# Patient Record
Sex: FEMALE | Race: WHITE | Employment: UNEMPLOYED | URBAN - METROPOLITAN AREA
[De-identification: names, ages, dates, MRNs, and addresses within clinical notes are randomized per-mention and may not be internally consistent; named-entity substitution may affect disease eponyms.]

---
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
## 2018-02-06 ENCOUNTER — TELEPHONE (OUTPATIENT)
Dept: OBGYN CLINIC | Facility: CLINIC | Age: 18
End: 2018-02-06

## 2018-02-06 DIAGNOSIS — Z11.3 ROUTINE SCREENING FOR STI (SEXUALLY TRANSMITTED INFECTION): ICD-10-CM

## 2018-02-07 RX ORDER — NORGESTIMATE AND ETHINYL ESTRADIOL
1 KIT DAILY
Qty: 28 TABLET | Refills: 6 | Status: SHIPPED | OUTPATIENT
Start: 2018-02-07 | End: 2018-06-07

## 2018-02-07 NOTE — TELEPHONE ENCOUNTER
Spoke w/ pt. States her migraines have resolved. Had pcp appt 1 mos after bp check in cnm office & states her bp was normal at that time (does not remember value). Advised pt to check bp in the next week & let our office know of results.  OK per MJ to refil

## 2018-02-07 NOTE — TELEPHONE ENCOUNTER
Fax received for req of ocp refill. Pt had elevated bp w/ recheck in Nov. Will check w/ pt to see if she has monitored her bp.  Unable to lm, voice mailbox is full

## 2018-02-15 NOTE — PROGRESS NOTES
[address], [address] [address]  Dept: [phone]     Date:   2017    Patient:  Adalid Almaguer  :      2000  MRN:      [id number]    Chief Complaint: Patient has no known allergies.      Social History:      Social History  Social History   Marital status: Single  Spouse name: N/A    Years of education: N/A  Number of children: N/A     Occupational History  None on file     Social History Main Topics   S meals, Utlize portion control strategies to reduce calorie intake, Identify triggers for eating and manage cues and Eat slowly and take 20 to 30 minutes to complete each meal    Exercise Goals Reviewed and Discussed    Walk for  30 Minutes and Treadmill fo Mary Ellen Don MD

## 2018-02-16 ENCOUNTER — LAB ENCOUNTER (OUTPATIENT)
Dept: LAB | Facility: HOSPITAL | Age: 18
End: 2018-02-16
Attending: INTERNAL MEDICINE
Payer: COMMERCIAL

## 2018-02-16 DIAGNOSIS — Z51.81 ENCOUNTER FOR THERAPEUTIC DRUG MONITORING: ICD-10-CM

## 2018-02-16 DIAGNOSIS — F50.81 BINGE EATING DISORDER: ICD-10-CM

## 2018-02-16 DIAGNOSIS — E66.01 MORBID OBESITY WITH BMI OF 45.0-49.9, ADULT (HCC): ICD-10-CM

## 2018-02-16 DIAGNOSIS — R73.09 ABNORMAL BLOOD SUGAR: ICD-10-CM

## 2018-02-16 DIAGNOSIS — R53.82 CHRONIC FATIGUE: ICD-10-CM

## 2018-02-16 DIAGNOSIS — E55.9 VITAMIN D DEFICIENCY: ICD-10-CM

## 2018-02-16 LAB
25(OH)D3 SERPL-MCNC: 20.1 NG/ML
ALBUMIN SERPL BCP-MCNC: 4 G/DL (ref 3.5–4.8)
ALBUMIN/GLOB SERPL: 1.2 {RATIO} (ref 1–2)
ALP SERPL-CCNC: 45 U/L (ref 39–325)
ALT SERPL-CCNC: 94 U/L (ref 14–54)
ANION GAP SERPL CALC-SCNC: 10 MMOL/L (ref 0–18)
AST SERPL-CCNC: 90 U/L (ref 15–41)
BASOPHILS # BLD: 0.1 K/UL (ref 0–0.2)
BASOPHILS NFR BLD: 1 %
BILIRUB SERPL-MCNC: 0.4 MG/DL (ref 0.3–1.2)
BUN SERPL-MCNC: 9 MG/DL (ref 8–20)
BUN/CREAT SERPL: 13.8 (ref 10–20)
CALCIUM SERPL-MCNC: 10 MG/DL (ref 8.5–10.5)
CHLORIDE SERPL-SCNC: 104 MMOL/L (ref 95–110)
CHOLEST SERPL-MCNC: 217 MG/DL (ref 110–200)
CO2 SERPL-SCNC: 25 MMOL/L (ref 22–32)
CREAT SERPL-MCNC: 0.65 MG/DL (ref 0.5–1.5)
EOSINOPHIL # BLD: 0.2 K/UL (ref 0–0.7)
EOSINOPHIL NFR BLD: 2 %
ERYTHROCYTE [DISTWIDTH] IN BLOOD BY AUTOMATED COUNT: 14.2 % (ref 11–15)
FERRITIN SERPL IA-MCNC: 47 NG/ML (ref 11–307)
FOLATE SERPL-MCNC: >24 NG/ML
GLOBULIN PLAS-MCNC: 3.4 G/DL (ref 2.5–3.7)
GLUCOSE SERPL-MCNC: 100 MG/DL (ref 70–99)
HBA1C MFR BLD: 5.6 % (ref 4–6)
HCT VFR BLD AUTO: 41.1 % (ref 35–48)
HDLC SERPL-MCNC: 49 MG/DL
HGB BLD-MCNC: 13.6 G/DL (ref 12–16)
IRON SATN MFR SERPL: 18 % (ref 15–50)
IRON SERPL-MCNC: 82 MCG/DL (ref 28–170)
LDLC SERPL CALC-MCNC: 142 MG/DL (ref 0–99)
LYMPHOCYTES # BLD: 2.2 K/UL (ref 1–4)
LYMPHOCYTES NFR BLD: 31 %
MAGNESIUM SERPL-MCNC: 1.9 MG/DL (ref 1.8–2.5)
MCH RBC QN AUTO: 27.9 PG (ref 27–32)
MCHC RBC AUTO-ENTMCNC: 33.2 G/DL (ref 32–37)
MCV RBC AUTO: 84 FL (ref 80–100)
MONOCYTES # BLD: 0.6 K/UL (ref 0–1)
MONOCYTES NFR BLD: 9 %
NEUTROPHILS # BLD AUTO: 4.1 K/UL (ref 1.8–7.7)
NEUTROPHILS NFR BLD: 58 %
NONHDLC SERPL-MCNC: 168 MG/DL
OSMOLALITY UR CALC.SUM OF ELEC: 287 MOSM/KG (ref 275–295)
PATIENT FASTING: YES
PHOSPHATE SERPL-MCNC: 3.7 MG/DL (ref 2.4–4.7)
PLATELET # BLD AUTO: 333 K/UL (ref 140–400)
PMV BLD AUTO: 7.2 FL (ref 7.4–10.3)
POTASSIUM SERPL-SCNC: 4.5 MMOL/L (ref 3.3–5.1)
PROT SERPL-MCNC: 7.4 G/DL (ref 5.9–8.4)
RBC # BLD AUTO: 4.89 M/UL (ref 3.7–5.4)
SODIUM SERPL-SCNC: 139 MMOL/L (ref 136–144)
TIBC SERPL-MCNC: 455 MCG/DL (ref 228–428)
TRANSFERRIN SERPL-MCNC: 345 MG/DL (ref 192–382)
TRIGL SERPL-MCNC: 130 MG/DL (ref 1–149)
TSH SERPL-ACNC: 1.1 UIU/ML (ref 0.45–5.33)
VIT B12 SERPL-MCNC: 459 PG/ML (ref 181–914)
WBC # BLD AUTO: 7.2 K/UL (ref 4–11)

## 2018-02-16 PROCEDURE — 85025 COMPLETE CBC W/AUTO DIFF WBC: CPT

## 2018-02-16 PROCEDURE — 84100 ASSAY OF PHOSPHORUS: CPT

## 2018-02-16 PROCEDURE — 82306 VITAMIN D 25 HYDROXY: CPT

## 2018-02-16 PROCEDURE — 84425 ASSAY OF VITAMIN B-1: CPT

## 2018-02-16 PROCEDURE — 82746 ASSAY OF FOLIC ACID SERUM: CPT

## 2018-02-16 PROCEDURE — 82607 VITAMIN B-12: CPT

## 2018-02-16 PROCEDURE — 80061 LIPID PANEL: CPT

## 2018-02-16 PROCEDURE — 84466 ASSAY OF TRANSFERRIN: CPT

## 2018-02-16 PROCEDURE — 83036 HEMOGLOBIN GLYCOSYLATED A1C: CPT

## 2018-02-16 PROCEDURE — 36415 COLL VENOUS BLD VENIPUNCTURE: CPT

## 2018-02-16 PROCEDURE — 84443 ASSAY THYROID STIM HORMONE: CPT

## 2018-02-16 PROCEDURE — 80053 COMPREHEN METABOLIC PANEL: CPT

## 2018-02-16 PROCEDURE — 82728 ASSAY OF FERRITIN: CPT

## 2018-02-16 PROCEDURE — 83540 ASSAY OF IRON: CPT

## 2018-02-16 PROCEDURE — 83735 ASSAY OF MAGNESIUM: CPT

## 2018-02-20 ENCOUNTER — TELEPHONE (OUTPATIENT)
Dept: SURGERY | Facility: CLINIC | Age: 18
End: 2018-02-20

## 2018-02-20 NOTE — TELEPHONE ENCOUNTER
Spoke to patient about labs    Elevated liver enzymes: fatty liver    Low vitamin D: continue supplements    High cholesterol

## 2018-02-21 LAB — VITAMIN B1 (THIAMINE), WHOLE B: 72 NMOL/L

## 2018-02-23 ENCOUNTER — TELEPHONE (OUTPATIENT)
Dept: SURGERY | Facility: CLINIC | Age: 18
End: 2018-02-23

## 2018-02-23 NOTE — TELEPHONE ENCOUNTER
2/23/18 @ 10:52am Spoke to Mariah Somers. at [facility], [phone], Ref#use name of rep, date & time of call. She verified that patient has following benefits for Bariatric services: No weight management criteria. No ARABELLA/Blue Distinction required.

## 2018-03-06 ENCOUNTER — TELEPHONE (OUTPATIENT)
Dept: OBGYN CLINIC | Facility: CLINIC | Age: 18
End: 2018-03-06

## 2018-03-06 NOTE — TELEPHONE ENCOUNTER
Pt states she thinks she has a yeast infection. Reports thick, white, pasty vag discharge & burning w/ urination. Has not tried otc treatments. Req appt. Scheduled w/ MES tomorrow.  Pt verbalized an understanding & agrees w/ plan

## 2018-03-07 ENCOUNTER — OFFICE VISIT (OUTPATIENT)
Dept: OBGYN CLINIC | Facility: CLINIC | Age: 18
End: 2018-03-07

## 2018-03-07 VITALS
DIASTOLIC BLOOD PRESSURE: 88 MMHG | HEART RATE: 101 BPM | BODY MASS INDEX: 50 KG/M2 | WEIGHT: 293 LBS | SYSTOLIC BLOOD PRESSURE: 131 MMHG

## 2018-03-07 DIAGNOSIS — B37.3 VAGINAL YEAST INFECTION: Primary | ICD-10-CM

## 2018-03-07 DIAGNOSIS — Z11.3 SCREEN FOR STD (SEXUALLY TRANSMITTED DISEASE): ICD-10-CM

## 2018-03-07 DIAGNOSIS — N89.8 VAGINAL DISCHARGE: ICD-10-CM

## 2018-03-07 PROCEDURE — 99213 OFFICE O/P EST LOW 20 MIN: CPT | Performed by: ADVANCED PRACTICE MIDWIFE

## 2018-03-07 PROCEDURE — 87210 SMEAR WET MOUNT SALINE/INK: CPT | Performed by: ADVANCED PRACTICE MIDWIFE

## 2018-03-08 ENCOUNTER — TELEPHONE (OUTPATIENT)
Dept: OBGYN CLINIC | Facility: CLINIC | Age: 18
End: 2018-03-08

## 2018-03-08 LAB
C TRACH DNA SPEC QL NAA+PROBE: NEGATIVE
N GONORRHOEA DNA SPEC QL NAA+PROBE: NEGATIVE

## 2018-03-08 NOTE — PROGRESS NOTES
[address] [facility] Focused Gynecology Problem Exam    Ashu Vegas is a 19year old female presenting for Gyn Exam (vaginal discharge, started yesterday, had burning when urinating yesterday,)  .     HPI:   Patient presents with:  Gyn Exam: Shasha Henderson Cancer Maternal Grandfather          Social History  Social History   Marital status: Single  Spouse name: N/A    Years of education: N/A  Number of children: N/A     Occupational History  None on file     Social History Main Topics   Smoking status: Never Jayshree Smith  3/8/2018  2:57 PM

## 2018-03-09 ENCOUNTER — TELEPHONE (OUTPATIENT)
Dept: OBGYN CLINIC | Facility: CLINIC | Age: 18
End: 2018-03-09

## 2018-03-09 ENCOUNTER — TELEPHONE (OUTPATIENT)
Dept: SURGERY | Facility: CLINIC | Age: 18
End: 2018-03-09

## 2018-03-09 NOTE — TELEPHONE ENCOUNTER
Called patient to follow up with her regarding scheduling an appt with Bariatric Dietitian per the recommendation of Dr. hTais Leone. Patient would like to schedule this either when she comes for her next appt with Dr. Thais Leone or she will call at a later date.

## 2018-06-07 ENCOUNTER — TELEPHONE (OUTPATIENT)
Dept: PEDIATRICS CLINIC | Facility: CLINIC | Age: 18
End: 2018-06-07

## 2018-06-07 DIAGNOSIS — Z11.3 ROUTINE SCREENING FOR STI (SEXUALLY TRANSMITTED INFECTION): ICD-10-CM

## 2018-06-07 RX ORDER — NORGESTIMATE AND ETHINYL ESTRADIOL
1 KIT DAILY
Qty: 28 TABLET | Refills: 5 | Status: SHIPPED | OUTPATIENT
Start: 2018-06-07 | End: 2018-06-14

## 2018-06-07 NOTE — TELEPHONE ENCOUNTER
Spoke w/ pt regarding need for recent bp check. Pt is seeing Dr Hugo Vann next week. Pt leaves for [place] on 6/19 t visit family x1mos. Discussed walking, hydration, wearing support hose due to risk of blood clots on flight.  Pt will call after her appt w/ bp r

## 2018-06-14 NOTE — TELEPHONE ENCOUNTER
Called pt for BP result from appt w/ Dr Evelina Katz today. Pt states she forgot to show up. Advised pt she needs to have BP taken prior to refill. Pt agreed. Appt scheduled for tomorrow at 0830.  Pt verbalized an understanding & agrees w/ plan

## 2018-06-15 ENCOUNTER — NURSE ONLY (OUTPATIENT)
Dept: OBGYN CLINIC | Facility: CLINIC | Age: 18
End: 2018-06-15

## 2018-06-15 VITALS — DIASTOLIC BLOOD PRESSURE: 87 MMHG | SYSTOLIC BLOOD PRESSURE: 121 MMHG

## 2018-06-15 DIAGNOSIS — Z01.30 BLOOD PRESSURE CHECK ON ORAL CONTRACEPTIVES: Primary | ICD-10-CM

## 2018-06-15 DIAGNOSIS — Z79.3 BLOOD PRESSURE CHECK ON ORAL CONTRACEPTIVES: Primary | ICD-10-CM

## 2018-06-15 RX ORDER — NORGESTIMATE AND ETHINYL ESTRADIOL 7DAYSX3 LO
1 KIT ORAL DAILY
Qty: 28 TABLET | Refills: 4 | Status: SHIPPED | OUTPATIENT
Start: 2018-06-15 | End: 2018-07-13

## 2018-06-15 NOTE — PROGRESS NOTES
Pt needed refill on ocp. Leaving for [facility] next week for 1 mos. Had elevated bp's prior. Pt was to have appt w/ Dr Ladonna Velazquez yesterday but missed. BP acceptable. Reviewed methods to decrease blood clots while traveling. MJ notified.  Pt verbalized an English

## 2018-06-30 ENCOUNTER — LAB ENCOUNTER (OUTPATIENT)
Dept: LAB | Age: 18
End: 2018-06-30
Attending: INTERNAL MEDICINE
Payer: COMMERCIAL

## 2018-06-30 ENCOUNTER — LAB ENCOUNTER (OUTPATIENT)
Dept: LAB | Facility: HOSPITAL | Age: 18
End: 2018-06-30
Attending: INTERNAL MEDICINE
Payer: COMMERCIAL

## 2018-06-30 DIAGNOSIS — R19.7 DIARRHEA: ICD-10-CM

## 2018-06-30 DIAGNOSIS — Z00.00 ROUTINE GENERAL MEDICAL EXAMINATION AT A HEALTH CARE FACILITY: Primary | ICD-10-CM

## 2018-06-30 DIAGNOSIS — Z00.00 ROUTINE GENERAL MEDICAL EXAMINATION AT A HEALTH CARE FACILITY: ICD-10-CM

## 2018-06-30 LAB
ALBUMIN SERPL BCP-MCNC: 4 G/DL (ref 3.5–4.8)
ALBUMIN/GLOB SERPL: 1.3 {RATIO} (ref 1–2)
ALP SERPL-CCNC: 52 U/L (ref 39–325)
ALT SERPL-CCNC: 56 U/L (ref 14–54)
ANION GAP SERPL CALC-SCNC: 10 MMOL/L (ref 0–18)
AST SERPL-CCNC: 39 U/L (ref 15–41)
BASOPHILS # BLD: 0 K/UL (ref 0–0.2)
BASOPHILS NFR BLD: 1 %
BILIRUB SERPL-MCNC: 0.4 MG/DL (ref 0.3–1.2)
BUN SERPL-MCNC: 7 MG/DL (ref 8–20)
BUN/CREAT SERPL: 10.4 (ref 10–20)
CALCIUM SERPL-MCNC: 9.5 MG/DL (ref 8.5–10.5)
CHLORIDE SERPL-SCNC: 105 MMOL/L (ref 95–110)
CHOLEST SERPL-MCNC: 209 MG/DL (ref 110–200)
CO2 SERPL-SCNC: 24 MMOL/L (ref 22–32)
CREAT SERPL-MCNC: 0.67 MG/DL (ref 0.5–1.5)
CRP SERPL HS-MCNC: 5.8 MG/L (ref 0–7.5)
EOSINOPHIL # BLD: 0.1 K/UL (ref 0–0.7)
EOSINOPHIL NFR BLD: 1 %
ERYTHROCYTE [DISTWIDTH] IN BLOOD BY AUTOMATED COUNT: 13.4 % (ref 11–15)
ERYTHROCYTE [SEDIMENTATION RATE] IN BLOOD: 12 MM/HR (ref 0–20)
FERRITIN SERPL IA-MCNC: 38 NG/ML (ref 11–307)
GLOBULIN PLAS-MCNC: 3.2 G/DL (ref 2.5–3.7)
GLUCOSE SERPL-MCNC: 87 MG/DL (ref 70–99)
HBA1C MFR BLD: 5.7 % (ref 4–6)
HCT VFR BLD AUTO: 42.4 % (ref 35–48)
HDLC SERPL-MCNC: 54 MG/DL
HGB BLD-MCNC: 14.2 G/DL (ref 12–16)
IRON SATN MFR SERPL: 20 % (ref 15–50)
IRON SERPL-MCNC: 85 MCG/DL (ref 28–170)
LDLC SERPL CALC-MCNC: 129 MG/DL (ref 0–99)
LIPASE SERPL-CCNC: 25 U/L (ref 22–51)
LYMPHOCYTES # BLD: 2.2 K/UL (ref 1–4)
LYMPHOCYTES NFR BLD: 29 %
MAGNESIUM SERPL-MCNC: 2 MG/DL (ref 1.8–2.5)
MCH RBC QN AUTO: 28 PG (ref 27–32)
MCHC RBC AUTO-ENTMCNC: 33.6 G/DL (ref 32–37)
MCV RBC AUTO: 83.2 FL (ref 80–100)
MONOCYTES # BLD: 0.6 K/UL (ref 0–1)
MONOCYTES NFR BLD: 8 %
NEUTROPHILS # BLD AUTO: 4.8 K/UL (ref 1.8–7.7)
NEUTROPHILS NFR BLD: 62 %
NONHDLC SERPL-MCNC: 155 MG/DL
OSMOLALITY UR CALC.SUM OF ELEC: 285 MOSM/KG (ref 275–295)
PATIENT FASTING: YES
PLATELET # BLD AUTO: 344 K/UL (ref 140–400)
PMV BLD AUTO: 7.9 FL (ref 7.4–10.3)
POTASSIUM SERPL-SCNC: 4 MMOL/L (ref 3.3–5.1)
PROT SERPL-MCNC: 7.2 G/DL (ref 5.9–8.4)
RBC # BLD AUTO: 5.1 M/UL (ref 3.7–5.4)
RHEUMATOID FACT SER QL: <5 IU/ML
SODIUM SERPL-SCNC: 139 MMOL/L (ref 136–144)
TIBC SERPL-MCNC: 432 MCG/DL (ref 228–428)
TRANSFERRIN SERPL-MCNC: 327 MG/DL (ref 192–382)
TRIGL SERPL-MCNC: 130 MG/DL (ref 1–149)
TSH SERPL-ACNC: 1.19 UIU/ML (ref 0.45–5.33)
VIT B12 SERPL-MCNC: 367 PG/ML (ref 181–914)
WBC # BLD AUTO: 7.8 K/UL (ref 4–11)

## 2018-06-30 PROCEDURE — 80500 HEPATITIS A B + C PROFILE: CPT

## 2018-06-30 PROCEDURE — 86704 HEP B CORE ANTIBODY TOTAL: CPT

## 2018-06-30 PROCEDURE — 36415 COLL VENOUS BLD VENIPUNCTURE: CPT

## 2018-06-30 PROCEDURE — 85025 COMPLETE CBC W/AUTO DIFF WBC: CPT

## 2018-06-30 PROCEDURE — 80053 COMPREHEN METABOLIC PANEL: CPT

## 2018-06-30 PROCEDURE — 86709 HEPATITIS A IGM ANTIBODY: CPT

## 2018-06-30 PROCEDURE — 86141 C-REACTIVE PROTEIN HS: CPT

## 2018-06-30 PROCEDURE — 86431 RHEUMATOID FACTOR QUANT: CPT

## 2018-06-30 PROCEDURE — 83540 ASSAY OF IRON: CPT

## 2018-06-30 PROCEDURE — 87507 IADNA-DNA/RNA PROBE TQ 12-25: CPT

## 2018-06-30 PROCEDURE — 86708 HEPATITIS A ANTIBODY: CPT

## 2018-06-30 PROCEDURE — 83036 HEMOGLOBIN GLYCOSYLATED A1C: CPT

## 2018-06-30 PROCEDURE — 87427 SHIGA-LIKE TOXIN AG IA: CPT

## 2018-06-30 PROCEDURE — 87340 HEPATITIS B SURFACE AG IA: CPT

## 2018-06-30 PROCEDURE — 87046 STOOL CULTR AEROBIC BACT EA: CPT

## 2018-06-30 PROCEDURE — 83735 ASSAY OF MAGNESIUM: CPT

## 2018-06-30 PROCEDURE — 83013 H PYLORI (C-13) BREATH: CPT

## 2018-06-30 PROCEDURE — 85652 RBC SED RATE AUTOMATED: CPT

## 2018-06-30 PROCEDURE — 86706 HEP B SURFACE ANTIBODY: CPT

## 2018-06-30 PROCEDURE — 84443 ASSAY THYROID STIM HORMONE: CPT

## 2018-06-30 PROCEDURE — 86038 ANTINUCLEAR ANTIBODIES: CPT

## 2018-06-30 PROCEDURE — 80061 LIPID PANEL: CPT

## 2018-06-30 PROCEDURE — 87045 FECES CULTURE AEROBIC BACT: CPT

## 2018-06-30 PROCEDURE — 82306 VITAMIN D 25 HYDROXY: CPT

## 2018-06-30 PROCEDURE — 83690 ASSAY OF LIPASE: CPT

## 2018-06-30 PROCEDURE — 86803 HEPATITIS C AB TEST: CPT

## 2018-06-30 PROCEDURE — 84466 ASSAY OF TRANSFERRIN: CPT

## 2018-06-30 PROCEDURE — 82607 VITAMIN B-12: CPT

## 2018-06-30 PROCEDURE — 82728 ASSAY OF FERRITIN: CPT

## 2018-07-02 LAB
25(OH)D3 SERPL-MCNC: 26.9 NG/ML
NUCLEAR IGG TITR SER IF: NEGATIVE {TITER}

## 2018-07-03 LAB
H. PYLORI BREATH TEST: NEGATIVE
HAV AB SER QL IA: REACTIVE
HAV IGM SERPL QL IA: NONREACTIVE
HBV CORE AB SERPL QL IA: NONREACTIVE
HBV SURFACE AB SER-ACNC: <3.1 MIU/ML (ref ?–10)
HBV SURFACE AG SERPL QL IA: NONREACTIVE
HBV SURFACE AG SERPL QL IA: NONREACTIVE
HCV AB SERPL QL IA: NONREACTIVE

## 2018-10-01 ENCOUNTER — TELEPHONE (OUTPATIENT)
Dept: OBGYN CLINIC | Facility: CLINIC | Age: 18
End: 2018-10-01

## 2018-10-01 RX ORDER — NORGESTIMATE AND ETHINYL ESTRADIOL 7DAYSX3 LO
1 KIT ORAL DAILY
Qty: 28 TABLET | Refills: 1 | Status: SHIPPED | OUTPATIENT
Start: 2018-10-01 | End: 2018-10-29

## 2018-10-01 NOTE — TELEPHONE ENCOUNTER
Spoke with pharmacy and they stated pt's insurance is requesting a 90 day supply of her OCP's Tri-LO-Sprintec. 90 day supply approved.

## 2018-10-01 NOTE — TELEPHONE ENCOUNTER
BRITTA. Refill request received from pt's pharmacy [facility] in [place] to refill Tri-Lo-Sprintec 28 day. Pt has a history of elevated BP's. Last BP was 121/87 on 6/15/18 and pt was given r/f. Last C&P was 11/7/17.   Per MES, OK for refill but pt must schedul

## 2018-10-01 NOTE — TELEPHONE ENCOUNTER
Pt was advised we received a request from her pharmacy to r/f OCP's. Per [facility] pt needs to schedule Annual in November and then we can r/f up until her appt. Appt scheduled for 11/17/18 for Annual with [facility]. Rx sent to her pharmacy. Pt agrees with plan.

## 2018-10-11 ENCOUNTER — TELEPHONE (OUTPATIENT)
Dept: OBGYN CLINIC | Facility: CLINIC | Age: 18
End: 2018-10-11

## 2018-10-11 RX ORDER — FLUCONAZOLE 150 MG/1
150 TABLET ORAL ONCE
Qty: 2 TABLET | Refills: 0 | Status: SHIPPED | OUTPATIENT
Start: 2018-10-11 | End: 2018-10-11

## 2018-10-11 NOTE — TELEPHONE ENCOUNTER
Spoke with pt and advised per MES, Rx for Terazol 7 and Diflucan were sent to her pharmacy. Instructions given to pt. Pt agreed and voiced understanding.

## 2018-10-11 NOTE — TELEPHONE ENCOUNTER
Spoke with pt who reports she was given antibiotics for strep throat and afterwards she began having symptoms of a yeast infection. Pt reports she is having vaginal itching and thick, white discharge.  Pt states she has tried OTC Monistat which did not reli

## 2018-10-29 ENCOUNTER — OFFICE VISIT (OUTPATIENT)
Dept: OTOLARYNGOLOGY | Facility: CLINIC | Age: 18
End: 2018-10-29
Payer: COMMERCIAL

## 2018-10-29 VITALS
TEMPERATURE: 99 F | WEIGHT: 293 LBS | SYSTOLIC BLOOD PRESSURE: 140 MMHG | DIASTOLIC BLOOD PRESSURE: 82 MMHG | HEIGHT: 68 IN | BODY MASS INDEX: 44.41 KG/M2

## 2018-10-29 DIAGNOSIS — J03.90 TONSILLITIS: Primary | ICD-10-CM

## 2018-10-29 PROCEDURE — 99243 OFF/OP CNSLTJ NEW/EST LOW 30: CPT | Performed by: OTOLARYNGOLOGY

## 2018-10-29 PROCEDURE — 99212 OFFICE O/P EST SF 10 MIN: CPT | Performed by: OTOLARYNGOLOGY

## 2018-10-29 RX ORDER — AMOXICILLIN 500 MG/1
500 CAPSULE ORAL 3 TIMES DAILY
Qty: 21 CAPSULE | Refills: 0 | Status: SHIPPED | OUTPATIENT
Start: 2018-10-29 | End: 2018-11-05

## 2018-10-29 NOTE — PROGRESS NOTES
Essie Parks is a 19year old female.   Patient presents with:  Throat Problem: pt reports recurring throat infections, tonsil stones, painful at times to swallow, right side is swollen      HISTORY OF PRESENT ILLNESS  10/29/2018  Patient prevents for Asked        Weight Concern: Not Asked        Special Diet: Not Asked        Back Care: Not Asked        Exercise: Not Asked        Bike Helmet: Not Asked        Seat Belt: Not Asked        Self-Exams: Not Asked    Social History Narrative      Not on file Normal.   Eyes Normal Conjunctiva - Right: Normal, Left: Normal. Pupil - Right: Normal, Left: Normal. Fundus - Right: Normal, Left: Normal.   Neurological Normal Memory - Normal. Cranial nerves - Cranial nerves II through XII grossly intact.    Head/Face No patient. Specifically, the risks of anesthesia, bleeding, need for further surgery to control bleeding were elaborated.  I also discussed the fact that  postoperative pain tends to increase on days 4 through 7. I also discussed dietary modifications includi

## 2018-11-05 ENCOUNTER — TELEPHONE (OUTPATIENT)
Dept: OTOLARYNGOLOGY | Facility: CLINIC | Age: 18
End: 2018-11-05

## 2018-11-05 RX ORDER — GUAIFENESIN 100 MG/5ML
200 SYRUP ORAL 4 TIMES DAILY PRN
COMMUNITY
End: 2020-01-08 | Stop reason: ALTCHOICE

## 2018-11-05 RX ORDER — ACETAMINOPHEN 325 MG/1
650 TABLET ORAL EVERY 6 HOURS PRN
Status: ON HOLD | COMMUNITY
End: 2018-11-09

## 2018-11-05 NOTE — TELEPHONE ENCOUNTER
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 Per pt states she started feeling worse on Thursday, had dry \"barking\" cough, fever last thursday, congestion. Pt asking if ok to proceed with surgery, tonsillectomy scheduled for 11/09.  Alonzo

## 2018-11-05 NOTE — TELEPHONE ENCOUNTER
Patient is having surgery with [facility] on 11/09/18. Patient states she has developed a cold. Voice is hoarse and has a bit of trouble breathing would like to know if there is something she can take. Please advise.  Thank you.- call transferred

## 2018-11-05 NOTE — TELEPHONE ENCOUNTER
Dr. Tova Zuluaga informed of note below, would like to give pt more time to see if pt symptoms improve. Will call pt on Wednesday for follow up.

## 2018-11-07 NOTE — TELEPHONE ENCOUNTER
Per pt states she is feeling better, still has cough at night, but dry not with phlegm, no fever, still having congestion. Please advise if ok for pt to proceed.

## 2018-11-07 NOTE — H&P
Signed • Encounter Date: 10/29/2018   Angeles Torres is a 19year old female.   Patient presents with:  Throat Problem: pt reports recurring throat infections, tonsil stones, painful at times to swallow, right side is swollen        HISTORY OF PRESENT [place] Not Asked        Stress Concern: Not Asked        Weight Concern: Not Asked        Special Diet: Not Asked        Back Care: Not Asked        Exercise: Not Asked        Bike Helmet: Not Asked        Seat Belt: Not Asked        Self-Exams: Not Asked    Soci Inspection - Normal. Palpation - Normal. Parotid gland - Normal. Thyroid gland - Normal.   Eyes Normal Conjunctiva - Right: Normal, Left: Normal. Pupil - Right: Normal, Left: Normal. Fundus - Right: Normal, Left: Normal.   Neurological Normal Memory - Norm starting antibiotics   The risks and alternatives to tonsillectomy were discussed at length with the patient. Specifically, the risks of anesthesia, bleeding, need for further surgery to control bleeding were elaborated.  I also discussed the fact that  pos

## 2018-11-09 ENCOUNTER — HOSPITAL ENCOUNTER (OUTPATIENT)
Facility: HOSPITAL | Age: 18
Setting detail: HOSPITAL OUTPATIENT SURGERY
Discharge: HOME | End: 2018-11-09
Attending: OTOLARYNGOLOGY | Admitting: OTOLARYNGOLOGY
Payer: COMMERCIAL

## 2018-11-09 ENCOUNTER — ANESTHESIA EVENT (OUTPATIENT)
Dept: SURGERY | Facility: HOSPITAL | Age: 18
End: 2018-11-09
Payer: COMMERCIAL

## 2018-11-09 ENCOUNTER — ANESTHESIA (OUTPATIENT)
Dept: SURGERY | Facility: HOSPITAL | Age: 18
End: 2018-11-09
Payer: COMMERCIAL

## 2018-11-09 VITALS
SYSTOLIC BLOOD PRESSURE: 148 MMHG | TEMPERATURE: 98 F | OXYGEN SATURATION: 96 % | HEART RATE: 100 BPM | HEIGHT: 68 IN | WEIGHT: 293 LBS | DIASTOLIC BLOOD PRESSURE: 87 MMHG | BODY MASS INDEX: 44.41 KG/M2 | RESPIRATION RATE: 14 BRPM

## 2018-11-09 DIAGNOSIS — J35.01 CHRONIC TONSILLITIS: ICD-10-CM

## 2018-11-09 PROCEDURE — 0CBPXZZ EXCISION OF TONSILS, EXTERNAL APPROACH: ICD-10-PCS | Performed by: OTOLARYNGOLOGY

## 2018-11-09 PROCEDURE — 42826 REMOVAL OF TONSILS: CPT | Performed by: OTOLARYNGOLOGY

## 2018-11-09 RX ORDER — ROCURONIUM BROMIDE 10 MG/ML
INJECTION, SOLUTION INTRAVENOUS AS NEEDED
Status: DISCONTINUED | OUTPATIENT
Start: 2018-11-09 | End: 2018-11-09 | Stop reason: SURG

## 2018-11-09 RX ORDER — ONDANSETRON 2 MG/ML
INJECTION INTRAMUSCULAR; INTRAVENOUS AS NEEDED
Status: DISCONTINUED | OUTPATIENT
Start: 2018-11-09 | End: 2018-11-09 | Stop reason: SURG

## 2018-11-09 RX ORDER — HYDROCODONE BITARTRATE AND ACETAMINOPHEN 5; 325 MG/1; MG/1
2 TABLET ORAL AS NEEDED
Status: DISCONTINUED | OUTPATIENT
Start: 2018-11-09 | End: 2018-11-09

## 2018-11-09 RX ORDER — ACETAMINOPHEN 500 MG
1000 TABLET ORAL ONCE
Status: COMPLETED | OUTPATIENT
Start: 2018-11-09 | End: 2018-11-09

## 2018-11-09 RX ORDER — MORPHINE SULFATE 4 MG/ML
4 INJECTION, SOLUTION INTRAMUSCULAR; INTRAVENOUS EVERY 10 MIN PRN
Status: DISCONTINUED | OUTPATIENT
Start: 2018-11-09 | End: 2018-11-09

## 2018-11-09 RX ORDER — MORPHINE SULFATE 10 MG/ML
6 INJECTION, SOLUTION INTRAMUSCULAR; INTRAVENOUS EVERY 10 MIN PRN
Status: DISCONTINUED | OUTPATIENT
Start: 2018-11-09 | End: 2018-11-09

## 2018-11-09 RX ORDER — NALOXONE HYDROCHLORIDE 0.4 MG/ML
80 INJECTION, SOLUTION INTRAMUSCULAR; INTRAVENOUS; SUBCUTANEOUS AS NEEDED
Status: DISCONTINUED | OUTPATIENT
Start: 2018-11-09 | End: 2018-11-09

## 2018-11-09 RX ORDER — METOCLOPRAMIDE 10 MG/1
10 TABLET ORAL ONCE
Status: COMPLETED | OUTPATIENT
Start: 2018-11-09 | End: 2018-11-09

## 2018-11-09 RX ORDER — MORPHINE SULFATE 4 MG/ML
2 INJECTION, SOLUTION INTRAMUSCULAR; INTRAVENOUS EVERY 10 MIN PRN
Status: DISCONTINUED | OUTPATIENT
Start: 2018-11-09 | End: 2018-11-09

## 2018-11-09 RX ORDER — SODIUM CHLORIDE 0.9 % (FLUSH) 0.9 %
10 SYRINGE (ML) INJECTION AS NEEDED
Status: CANCELLED | OUTPATIENT
Start: 2018-11-09

## 2018-11-09 RX ORDER — HYDROCODONE BITARTRATE AND ACETAMINOPHEN 5; 325 MG/1; MG/1
1 TABLET ORAL AS NEEDED
Status: DISCONTINUED | OUTPATIENT
Start: 2018-11-09 | End: 2018-11-09

## 2018-11-09 RX ORDER — LIDOCAINE HYDROCHLORIDE 10 MG/ML
INJECTION, SOLUTION EPIDURAL; INFILTRATION; INTRACAUDAL; PERINEURAL AS NEEDED
Status: DISCONTINUED | OUTPATIENT
Start: 2018-11-09 | End: 2018-11-09 | Stop reason: SURG

## 2018-11-09 RX ORDER — SODIUM CHLORIDE, SODIUM LACTATE, POTASSIUM CHLORIDE, CALCIUM CHLORIDE 600; 310; 30; 20 MG/100ML; MG/100ML; MG/100ML; MG/100ML
INJECTION, SOLUTION INTRAVENOUS CONTINUOUS
Status: DISCONTINUED | OUTPATIENT
Start: 2018-11-09 | End: 2018-11-09

## 2018-11-09 RX ORDER — FAMOTIDINE 20 MG/1
20 TABLET ORAL ONCE
Status: COMPLETED | OUTPATIENT
Start: 2018-11-09 | End: 2018-11-09

## 2018-11-09 RX ORDER — DEXAMETHASONE 6 MG/1
TABLET ORAL
Qty: 3 TABLET | Refills: 0 | Status: SHIPPED | OUTPATIENT
Start: 2018-11-12 | End: 2018-11-17

## 2018-11-09 RX ORDER — ONDANSETRON 2 MG/ML
4 INJECTION INTRAMUSCULAR; INTRAVENOUS ONCE AS NEEDED
Status: DISCONTINUED | OUTPATIENT
Start: 2018-11-09 | End: 2018-11-09

## 2018-11-09 RX ORDER — DEXAMETHASONE SODIUM PHOSPHATE 4 MG/ML
VIAL (ML) INJECTION AS NEEDED
Status: DISCONTINUED | OUTPATIENT
Start: 2018-11-09 | End: 2018-11-09 | Stop reason: SURG

## 2018-11-09 RX ORDER — ONDANSETRON 4 MG/1
4 TABLET, ORALLY DISINTEGRATING ORAL EVERY 6 HOURS PRN
Status: CANCELLED | OUTPATIENT
Start: 2018-11-09

## 2018-11-09 RX ORDER — CELECOXIB 200 MG/1
200 CAPSULE ORAL 2 TIMES DAILY
Qty: 28 CAPSULE | Refills: 0 | Status: SHIPPED | OUTPATIENT
Start: 2018-11-09 | End: 2018-11-23

## 2018-11-09 RX ORDER — ONDANSETRON 2 MG/ML
4 INJECTION INTRAMUSCULAR; INTRAVENOUS EVERY 6 HOURS PRN
Status: CANCELLED | OUTPATIENT
Start: 2018-11-09

## 2018-11-09 RX ORDER — GLYCOPYRROLATE 0.2 MG/ML
INJECTION INTRAMUSCULAR; INTRAVENOUS AS NEEDED
Status: DISCONTINUED | OUTPATIENT
Start: 2018-11-09 | End: 2018-11-09 | Stop reason: SURG

## 2018-11-09 RX ORDER — ONDANSETRON 4 MG/1
4 TABLET, FILM COATED ORAL EVERY 6 HOURS PRN
Status: CANCELLED | OUTPATIENT
Start: 2018-11-09

## 2018-11-09 RX ADMIN — GLYCOPYRROLATE 0.2 MG: 0.2 INJECTION INTRAMUSCULAR; INTRAVENOUS at 09:31:00

## 2018-11-09 RX ADMIN — DEXAMETHASONE SODIUM PHOSPHATE 4 MG: 4 MG/ML VIAL (ML) INJECTION at 09:35:00

## 2018-11-09 RX ADMIN — LIDOCAINE HYDROCHLORIDE 50 MG: 10 INJECTION, SOLUTION EPIDURAL; INFILTRATION; INTRACAUDAL; PERINEURAL at 09:35:00

## 2018-11-09 RX ADMIN — ROCURONIUM BROMIDE 10 MG: 10 INJECTION, SOLUTION INTRAVENOUS at 09:35:00

## 2018-11-09 RX ADMIN — SODIUM CHLORIDE, SODIUM LACTATE, POTASSIUM CHLORIDE, CALCIUM CHLORIDE: 600; 310; 30; 20 INJECTION, SOLUTION INTRAVENOUS at 09:31:00

## 2018-11-09 RX ADMIN — ROCURONIUM BROMIDE 30 MG: 10 INJECTION, SOLUTION INTRAVENOUS at 09:46:00

## 2018-11-09 RX ADMIN — SODIUM CHLORIDE, SODIUM LACTATE, POTASSIUM CHLORIDE, CALCIUM CHLORIDE: 600; 310; 30; 20 INJECTION, SOLUTION INTRAVENOUS at 09:55:00

## 2018-11-09 RX ADMIN — ONDANSETRON 4 MG: 2 INJECTION INTRAMUSCULAR; INTRAVENOUS at 09:35:00

## 2018-11-09 NOTE — ANESTHESIA PROCEDURE NOTES
ANESTHESIA INTUBATION  Date/Time: 11/9/2018 9:41 AM  Urgency: elective    Airway not difficult    General Information and Staff    Patient location during procedure: OR  Anesthesiologist: Yesika Mcfadden MD  Resident/CRNA: MINESH Jeffries

## 2018-11-09 NOTE — ANESTHESIA POSTPROCEDURE EVALUATION
Patient: Hunter Chaves    Procedure Summary     Date:  11/09/18 Room / Location:  [address] [place] [address] MAIN OR 02 / [address] [place] [address] MAIN OR    Anesthesia Start:  0906 Anesthesia Stop:  8012    Procedure:  TONSILLECTOMY (Bilateral ) Diagnosis:       Chronic tonsillitis      (Chroni

## 2018-11-09 NOTE — ANESTHESIA PREPROCEDURE EVALUATION
Anesthesia PreOp Note    HPI:     Talia Lynch is a 19year old female who presents for preoperative consultation requested by: Dameon Mar MD    Date of Surgery: 11/9/2018    Procedure(s):  TONSILLECTOMY  Indication: Chronic tonsillitis [J35.01] 0800   Albuterol Sulfate HFA (PROAIR HFA) 108 (90 BASE) MCG/ACT Inhalation Aero Soln Inhale 2 puffs into the lungs every 4 (four) hours as needed for Wheezing.  Disp: 2 Inhaler Rfl: 0 Past Week at Unknown time   guaiFENesin 100 MG/5ML Oral Syrup Take 200 mg Pain. Disp: 300 mL Rfl: 0   [START ON 11/12/2018] dexamethasone 6 MG Oral Tab 1 po 11/12, 11,14, 11,17 Disp: 3 tablet Rfl: 0   celecoxib 200 MG Oral Cap Take 1 capsule (200 mg total) by mouth 2 (two) times daily for 14 days.  Substitute advil/ alleve if too History Narrative      Not on file      Available pre-op labs reviewed. Lab Results   Component Value Date    URINEPREG Negative 11/09/2018             Vital Signs: Body mass index is 51.7 kg/m².    height is 1.727 m (5' 8\") and weight is 154.2 kg (340 l

## 2018-11-09 NOTE — OPERATIVE REPORT
Lisbeth Todd  DATE OF SURGERY: 11/9/2018    PREOPERATIVE DIAGNOSIS:  Chronic tonsillitis  POSTOPERATIVE DIAGNOSIS: Same. OPERATIVE PROCEDURE:    tonsillectomy  ATTENDING SURGEON:   Mikie Barker M.D.     ANESTHESIA:  GENERAL  INDICATIONS FOR PROCED

## 2018-11-09 NOTE — INTERVAL H&P NOTE
Pre-op Diagnosis: Chronic tonsillitis [J35.01]    The above referenced H&P was reviewed by Soumya Steen MD on 11/9/2018, the patient was examined and no significant changes have occurred in the patient's condition since the H&P was performed.   I discussed

## 2018-11-10 ENCOUNTER — TELEPHONE (OUTPATIENT)
Dept: OTOLARYNGOLOGY | Facility: CLINIC | Age: 18
End: 2018-11-10

## 2018-11-10 NOTE — TELEPHONE ENCOUNTER
Pt is post op day 1 tonsillectomy. Per pt  is doing ok, pt c/o of pain , drinking plenty of fluids, no bleeding or fever.  Advised pt pain can worsen post op and radiate to jaw, ears, and is not abnormal, pt is taking medications and  to start steroid  as a

## 2018-11-13 ENCOUNTER — HOSPITAL ENCOUNTER (EMERGENCY)
Facility: HOSPITAL | Age: 18
Discharge: HOME | End: 2018-11-13
Attending: EMERGENCY MEDICINE
Payer: COMMERCIAL

## 2018-11-13 ENCOUNTER — TELEPHONE (OUTPATIENT)
Dept: OTOLARYNGOLOGY | Facility: CLINIC | Age: 18
End: 2018-11-13

## 2018-11-13 VITALS
RESPIRATION RATE: 18 BRPM | HEART RATE: 105 BPM | WEIGHT: 293 LBS | OXYGEN SATURATION: 99 % | TEMPERATURE: 97 F | SYSTOLIC BLOOD PRESSURE: 145 MMHG | BODY MASS INDEX: 44.41 KG/M2 | HEIGHT: 68 IN | DIASTOLIC BLOOD PRESSURE: 78 MMHG

## 2018-11-13 DIAGNOSIS — G89.18 ACUTE POSTOPERATIVE PAIN: Primary | ICD-10-CM

## 2018-11-13 PROCEDURE — 96361 HYDRATE IV INFUSION ADD-ON: CPT

## 2018-11-13 PROCEDURE — 99285 EMERGENCY DEPT VISIT HI MDM: CPT

## 2018-11-13 PROCEDURE — 96374 THER/PROPH/DIAG INJ IV PUSH: CPT

## 2018-11-13 PROCEDURE — 96375 TX/PRO/DX INJ NEW DRUG ADDON: CPT

## 2018-11-13 RX ORDER — KETOROLAC TROMETHAMINE 30 MG/ML
30 INJECTION, SOLUTION INTRAMUSCULAR; INTRAVENOUS ONCE
Status: COMPLETED | OUTPATIENT
Start: 2018-11-13 | End: 2018-11-13

## 2018-11-13 RX ORDER — BENZONATATE 100 MG/1
100 CAPSULE ORAL ONCE
Status: COMPLETED | OUTPATIENT
Start: 2018-11-13 | End: 2018-11-13

## 2018-11-13 RX ORDER — ONDANSETRON 4 MG/1
4 TABLET, ORALLY DISINTEGRATING ORAL EVERY 4 HOURS PRN
Qty: 10 TABLET | Refills: 0 | Status: SHIPPED | OUTPATIENT
Start: 2018-11-13 | End: 2018-11-20

## 2018-11-13 RX ORDER — HYDROCODONE BITARTRATE AND ACETAMINOPHEN 10; 325 MG/1; MG/1
1 TABLET ORAL EVERY 4 HOURS PRN
Qty: 10 TABLET | Refills: 0 | Status: SHIPPED | OUTPATIENT
Start: 2018-11-13 | End: 2018-11-20

## 2018-11-13 RX ORDER — ONDANSETRON 4 MG/1
4 TABLET, ORALLY DISINTEGRATING ORAL ONCE
Status: COMPLETED | OUTPATIENT
Start: 2018-11-13 | End: 2018-11-13

## 2018-11-13 RX ORDER — HYDROCODONE BITARTRATE AND ACETAMINOPHEN 5; 325 MG/1; MG/1
2 TABLET ORAL ONCE
Status: COMPLETED | OUTPATIENT
Start: 2018-11-13 | End: 2018-11-13

## 2018-11-13 RX ORDER — BENZONATATE 100 MG/1
100 CAPSULE ORAL 3 TIMES DAILY PRN
Qty: 30 CAPSULE | Refills: 0 | Status: SHIPPED | OUTPATIENT
Start: 2018-11-13 | End: 2018-12-13

## 2018-11-13 RX ORDER — MORPHINE SULFATE 4 MG/ML
4 INJECTION, SOLUTION INTRAMUSCULAR; INTRAVENOUS ONCE
Status: COMPLETED | OUTPATIENT
Start: 2018-11-13 | End: 2018-11-13

## 2018-11-13 NOTE — TELEPHONE ENCOUNTER
Per pt mother pt is having severe pain, in throat feels like it is burning, no bleeding or fever,  and pain medication not helping, Per pt mother pt is not drinking fluids or eating.  pt has had only about 32 oz in the last 2 days and pt only ate 2 tablespo

## 2018-11-14 NOTE — ED INITIAL ASSESSMENT (HPI)
Pt had her tonsils removed on Friday, was on antibiotics before the procedure, finished yesterday, here today w/ complaint of sore throat since procedure. Vomited x 1, diarrhea.  Denies fevers or bleeding

## 2018-11-14 NOTE — ED PROVIDER NOTES
Patient Seen in: [facility] AND [facility] Emergency Department    History   Patient presents with:  Sore Throat    Stated Complaint: Throat Pain x4 days s/p Tonsils removed    HPI    57-year-old female status post tonsillectomy 4 days ago presents for complaint Other systems are as noted in HPI. Constitutional and vital signs reviewed. All other systems reviewed and negative except as noted above. Physical Exam     ED Triage Vitals [11/13/18 1836]   /75   Pulse (!) 122   Resp 20   Temp 97.8 °F (36. Patient is non-toxic, tolerating PO and in no respiratory distress. Airway is patent and patient is tolerating secretions without difficulty. She is feeling better after Toradol and morphine. She is given IV fluids. She was slightly dehydrated.   She pre

## 2018-11-15 ENCOUNTER — OFFICE VISIT (OUTPATIENT)
Dept: OTOLARYNGOLOGY | Facility: CLINIC | Age: 18
End: 2018-11-15
Payer: COMMERCIAL

## 2018-11-15 VITALS
TEMPERATURE: 99 F | WEIGHT: 293 LBS | DIASTOLIC BLOOD PRESSURE: 90 MMHG | SYSTOLIC BLOOD PRESSURE: 114 MMHG | BODY MASS INDEX: 44.41 KG/M2 | HEIGHT: 68 IN

## 2018-11-15 DIAGNOSIS — R07.0 THROAT PAIN IN ADULT: Primary | ICD-10-CM

## 2018-11-15 PROCEDURE — 99212 OFFICE O/P EST SF 10 MIN: CPT | Performed by: OTOLARYNGOLOGY

## 2018-11-15 PROCEDURE — 99024 POSTOP FOLLOW-UP VISIT: CPT | Performed by: OTOLARYNGOLOGY

## 2018-11-15 RX ORDER — HYDROCODONE BITARTRATE AND ACETAMINOPHEN 10; 325 MG/1; MG/1
1 TABLET ORAL EVERY 6 HOURS PRN
Qty: 30 TABLET | Refills: 0 | Status: SHIPPED | OUTPATIENT
Start: 2018-11-15 | End: 2018-11-26

## 2018-11-15 NOTE — PROGRESS NOTES
Krystyna Rose is a 19year old female. Patient presents with:  Post-Op: s/p tonsillectomy done on 11/9/18      HISTORY OF PRESENT ILLNESS  10/29/2018  Patient prevents for evaluation of sore throats. This is recurrent.   She gets frequent sore throats Asked        Stress Concern: Not Asked        Weight Concern: Not Asked        Special Diet: Not Asked        Back Care: Not Asked        Exercise: Not Asked        Bike Helmet: Not Asked        Seat Belt: Not Asked        Self-Exams: Not Asked    Social H Overall appearance  morbidly obese   Psychiatric Normal Orientation - Oriented to time, place, person & situation. Appropriate mood and affect.    Neck Exam Normal Inspection - Normal. Palpation - Normal. Parotid gland - Normal. Thyroid gland - Normal.   Angel Mckenzie mouth 4 (four) times daily as needed for cough. , Disp: , Rfl:   •  Homeopathic Products (ZICAM ALLERGY RELIEF NA), 1 spray by Nasal route 2 (two) times daily as needed. , Disp: , Rfl:   •  CLONAZEPAM OR, Take by mouth daily as needed (anxiety ; pt has no ta

## 2018-11-16 ENCOUNTER — TELEPHONE (OUTPATIENT)
Dept: OTOLARYNGOLOGY | Facility: CLINIC | Age: 18
End: 2018-11-16

## 2018-11-16 NOTE — TELEPHONE ENCOUNTER
Pt was given norco rx at ER in 11/14 - Dr Fermin Carmen gave her another rx for norco yesterday - pharm asking to verify rx - ok to dispense

## 2018-11-17 ENCOUNTER — HOSPITAL ENCOUNTER (EMERGENCY)
Facility: HOSPITAL | Age: 18
Discharge: HOME | End: 2018-11-17
Payer: COMMERCIAL

## 2018-11-17 VITALS
HEIGHT: 68 IN | DIASTOLIC BLOOD PRESSURE: 86 MMHG | OXYGEN SATURATION: 98 % | TEMPERATURE: 99 F | RESPIRATION RATE: 16 BRPM | BODY MASS INDEX: 44.41 KG/M2 | HEART RATE: 87 BPM | SYSTOLIC BLOOD PRESSURE: 138 MMHG | WEIGHT: 293 LBS

## 2018-11-17 DIAGNOSIS — R07.0 THROAT PAIN IN ADULT: Primary | ICD-10-CM

## 2018-11-17 PROCEDURE — 96374 THER/PROPH/DIAG INJ IV PUSH: CPT

## 2018-11-17 PROCEDURE — 99284 EMERGENCY DEPT VISIT MOD MDM: CPT

## 2018-11-17 PROCEDURE — 96361 HYDRATE IV INFUSION ADD-ON: CPT

## 2018-11-17 PROCEDURE — 81025 URINE PREGNANCY TEST: CPT

## 2018-11-17 RX ORDER — TRAMADOL HYDROCHLORIDE 50 MG/1
50 TABLET ORAL ONCE
Status: COMPLETED | OUTPATIENT
Start: 2018-11-17 | End: 2018-11-17

## 2018-11-17 RX ORDER — KETOROLAC TROMETHAMINE 30 MG/ML
30 INJECTION, SOLUTION INTRAMUSCULAR; INTRAVENOUS ONCE
Status: COMPLETED | OUTPATIENT
Start: 2018-11-17 | End: 2018-11-17

## 2018-11-17 RX ORDER — TRAMADOL HYDROCHLORIDE 50 MG/1
TABLET ORAL EVERY 4 HOURS PRN
Qty: 10 TABLET | Refills: 0 | Status: SHIPPED | OUTPATIENT
Start: 2018-11-17 | End: 2018-11-24

## 2018-11-17 NOTE — ED PROVIDER NOTES
Patient Seen in: [facility] AND [facility] Emergency Department    History   Patient presents with:  Sore Throat    Stated Complaint: Sore Throat/ feeling sick/ maybe dehydrated.      HPI    49-year-old female to the emergency department with complaints of bilate every 4 (four) hours as needed for Pain. HYDROcodone-acetaminophen (NORCO)  MG Oral Tab,  Take 1 tablet by mouth every 6 (six) hours as needed for Pain.    HYDROcodone-acetaminophen  MG Oral Tab,  Take 1 tablet by mouth every 4 (four) hours as Throat/ feeling sick/ maybe dehydrated. Other systems are as noted in HPI. Constitutional and vital signs reviewed. All other systems reviewed and negative except as noted above.     PSFH elements reviewed from today and agreed except as otherwise s days        Medications Prescribed:  Current Discharge Medication List    START taking these medications    TraMADol HCl 50 MG Oral Tab  Take 1-2 tablets ( mg total) by mouth every 4 (four) hours as needed for Pain.   Qty: 10 tablet Refills: 0

## 2018-11-19 ENCOUNTER — TELEPHONE (OUTPATIENT)
Dept: OBGYN CLINIC | Facility: CLINIC | Age: 18
End: 2018-11-19

## 2018-11-21 NOTE — TELEPHONE ENCOUNTER
Advised pt of missed annual appt. Pt states she had her tonsils removed. Appt rescheduled for Mon 11/26 w/ MBW.  Pt verbalized an understanding & agrees w/ plan

## 2018-11-26 ENCOUNTER — OFFICE VISIT (OUTPATIENT)
Dept: OBGYN CLINIC | Facility: CLINIC | Age: 18
End: 2018-11-26
Payer: COMMERCIAL

## 2018-11-26 VITALS
SYSTOLIC BLOOD PRESSURE: 138 MMHG | DIASTOLIC BLOOD PRESSURE: 90 MMHG | HEART RATE: 102 BPM | BODY MASS INDEX: 49 KG/M2 | WEIGHT: 293 LBS

## 2018-11-26 DIAGNOSIS — Z12.39 SCREENING BREAST EXAMINATION: ICD-10-CM

## 2018-11-26 DIAGNOSIS — Z11.3 SCREEN FOR STD (SEXUALLY TRANSMITTED DISEASE): ICD-10-CM

## 2018-11-26 DIAGNOSIS — Z01.411 ENCOUNTER FOR GYNECOLOGICAL EXAMINATION WITH ABNORMAL FINDING: Primary | ICD-10-CM

## 2018-11-26 PROCEDURE — 99395 PREV VISIT EST AGE 18-39: CPT | Performed by: ADVANCED PRACTICE MIDWIFE

## 2018-11-26 PROCEDURE — 90686 IIV4 VACC NO PRSV 0.5 ML IM: CPT | Performed by: ADVANCED PRACTICE MIDWIFE

## 2018-11-26 PROCEDURE — 90471 IMMUNIZATION ADMIN: CPT | Performed by: ADVANCED PRACTICE MIDWIFE

## 2018-11-26 RX ORDER — NORGESTIMATE AND ETHINYL ESTRADIOL 7DAYSX3 LO
1 KIT ORAL DAILY
Qty: 3 PACKAGE | Refills: 3 | Status: SHIPPED | OUTPATIENT
Start: 2018-11-26 | End: 2018-12-24

## 2018-11-26 NOTE — PROGRESS NOTES
HPI:   Angeles Torres is a 19year old female who presents for a gyne annual physical exam.  New partner in the past year. Seeing bariatrics for weight loss, has PCP she sees. Happy with OCPs desires refill X 1 year. On anxiety medications.    Exercise: • TONSILLECTOMY Bilateral 11/9/2018    Performed by Sami Reich MD at [address] [place] [address] MAIN OR      Family History   Problem Relation Age of Onset   • Asthma Mother    • Diabetes Mother    • Hypertension Father    • Lipids Father    • Lipids Maternal Grandmother masses palpable;  PAP not indicated.    RECTAL: no hemorrhoids, no lesions  MUSCULOSKELETAL: back is not tender, no gross MSK abnormality  EXTREMITIES: no varicosities or edema  NEURO: Oriented times three, motor and sensory are grossly intact    ASSESSMENT

## 2018-11-28 ENCOUNTER — TELEPHONE (OUTPATIENT)
Dept: OBGYN CLINIC | Facility: CLINIC | Age: 18
End: 2018-11-28

## 2018-11-28 NOTE — TELEPHONE ENCOUNTER
----- Message from Bart Griffith CNM sent at 11/28/2018  9:23 AM CST -----  Normal gonorrhea and chlamydia screen. Please call to inform.

## 2018-11-28 NOTE — TELEPHONE ENCOUNTER
Spoke with pt and advised of normal gonorrhea and chlamydia per MBW. Pt agreed and voiced understanding.

## 2019-02-22 ENCOUNTER — TELEPHONE (OUTPATIENT)
Dept: OBGYN CLINIC | Facility: CLINIC | Age: 19
End: 2019-02-22

## 2019-02-22 NOTE — TELEPHONE ENCOUNTER
Pt was given different generic & is experiencing some spotting & irregularity this month. Advised pt that sometimes generics can be slightly different but also pt is losing weight & it may affect her periods.  Advised to let her pharmacy know shed like to h

## 2019-10-28 ENCOUNTER — TELEPHONE (OUTPATIENT)
Dept: OBGYN CLINIC | Facility: CLINIC | Age: 19
End: 2019-10-28

## 2019-10-28 RX ORDER — NORGESTIMATE AND ETHINYL ESTRADIOL 7DAYSX3 LO
1 KIT ORAL DAILY
Qty: 3 PACKAGE | Refills: 0 | Status: SHIPPED | OUTPATIENT
Start: 2019-10-28 | End: 2020-01-08

## 2019-10-28 NOTE — TELEPHONE ENCOUNTER
Pt req refill on ocp. Usually gets 3 mos supply. Due for an annual appt in Nov. appt scheduled. Pharmacy confirmed. rx sent.  Pt verbalized an understanding & agrees w/ plan

## 2020-01-08 ENCOUNTER — OFFICE VISIT (OUTPATIENT)
Dept: OBGYN CLINIC | Facility: CLINIC | Age: 20
End: 2020-01-08
Payer: COMMERCIAL

## 2020-01-08 ENCOUNTER — OFFICE VISIT (OUTPATIENT)
Dept: SURGERY | Facility: CLINIC | Age: 20
End: 2020-01-08
Payer: COMMERCIAL

## 2020-01-08 VITALS
HEIGHT: 67 IN | DIASTOLIC BLOOD PRESSURE: 86 MMHG | HEART RATE: 121 BPM | WEIGHT: 293 LBS | BODY MASS INDEX: 45.99 KG/M2 | SYSTOLIC BLOOD PRESSURE: 128 MMHG

## 2020-01-08 DIAGNOSIS — Z01.419 WOMEN'S ANNUAL ROUTINE GYNECOLOGICAL EXAMINATION: Primary | ICD-10-CM

## 2020-01-08 DIAGNOSIS — Z51.81 ENCOUNTER FOR THERAPEUTIC DRUG MONITORING: ICD-10-CM

## 2020-01-08 DIAGNOSIS — K76.0 FATTY LIVER: Primary | ICD-10-CM

## 2020-01-08 DIAGNOSIS — Z12.4 SCREENING FOR CERVICAL CANCER: ICD-10-CM

## 2020-01-08 DIAGNOSIS — F50.81 BINGE EATING DISORDER: ICD-10-CM

## 2020-01-08 DIAGNOSIS — E55.9 VITAMIN D DEFICIENCY: ICD-10-CM

## 2020-01-08 DIAGNOSIS — R73.09 ABNORMAL BLOOD SUGAR: ICD-10-CM

## 2020-01-08 DIAGNOSIS — E66.01 MORBID OBESITY WITH BMI OF 50.0-59.9, ADULT (HCC): ICD-10-CM

## 2020-01-08 PROCEDURE — 99214 OFFICE O/P EST MOD 30 MIN: CPT | Performed by: INTERNAL MEDICINE

## 2020-01-08 PROCEDURE — 99395 PREV VISIT EST AGE 18-39: CPT | Performed by: ADVANCED PRACTICE MIDWIFE

## 2020-01-08 RX ORDER — NORGESTIMATE AND ETHINYL ESTRADIOL 7DAYSX3 LO
1 KIT ORAL DAILY
Qty: 3 PACKAGE | Refills: 0 | Status: SHIPPED | OUTPATIENT
Start: 2020-01-08 | End: 2020-03-27

## 2020-01-08 RX ORDER — MELATONIN 10 MG
CAPSULE ORAL
COMMUNITY
End: 2021-02-24

## 2020-01-08 NOTE — PROGRESS NOTES
[address]  Dept: [phone]         Patient:  Talia Lynch  :      2000  MRN:      [id number]    Chief Complaint:  Patient presents (four) hours as needed for Wheezing. 2 Inhaler 0     Allergies:  Patient has no known allergies.      Social History:    Social History    Socioeconomic History      Marital status: Single      Spouse name: Not on file      Number of children: Not on file Exercise: Not Asked        Bike Helmet: Not Asked        Seat Belt: Not Asked        Self-Exams: Not Asked    Social History Narrative      Not on file    Surgical History:    Past Surgical History:   Procedure Laterality Date   • TONSILLECTOMY  11/09/2018 Constitutional: positive for fatigue  Respiratory: negative  Cardiovascular: negative  Gastrointestinal: negative  Musculoskeletal:negative  Neurological: negative  Behavioral/Psych: negative  Endocrine: negative  All other systems were reviewed and are Refer to Ирина Castillo MD    Orders Placed This Encounter      Comp Metabolic Panel (14)          Standing Status: Future          Standing Expiration Date: 1/7/2021      Iron, Serum          Standing Status: Future          Standing Expiration Da

## 2020-01-08 NOTE — PROGRESS NOTES
HPI:    Patient ID: Justin Keller is a 22year old female who presents for her annual gyne exam.  Pt lives with parents. Pt works PT. Pt has one male partner x 4 months. Pt has had 10 live time partners. Pt always uses condoms.  Pt is on OCPs for cont There is no rash, tenderness, lesion or injury on the left labia. Cervix exhibits no motion tenderness and no discharge. No vaginal erythema, tenderness or bleeding. No erythema, tenderness or bleeding in the vagina.     No signs of injury in the vagin

## 2020-01-10 ENCOUNTER — TELEPHONE (OUTPATIENT)
Dept: OBGYN CLINIC | Facility: CLINIC | Age: 20
End: 2020-01-10

## 2020-01-10 NOTE — TELEPHONE ENCOUNTER
Pt looking for pap results. Advised results still in process. Reminded pt to complete blood work & instructions given.  Pt verbalized an understanding & agrees w/ plan

## 2020-01-11 ENCOUNTER — APPOINTMENT (OUTPATIENT)
Dept: LAB | Facility: HOSPITAL | Age: 20
End: 2020-01-11
Attending: ADVANCED PRACTICE MIDWIFE
Payer: COMMERCIAL

## 2020-01-11 DIAGNOSIS — F50.81 BINGE EATING DISORDER: ICD-10-CM

## 2020-01-11 DIAGNOSIS — Z51.81 ENCOUNTER FOR THERAPEUTIC DRUG MONITORING: ICD-10-CM

## 2020-01-11 DIAGNOSIS — E55.9 VITAMIN D DEFICIENCY: ICD-10-CM

## 2020-01-11 DIAGNOSIS — K76.0 FATTY LIVER: ICD-10-CM

## 2020-01-11 DIAGNOSIS — R73.09 ABNORMAL BLOOD SUGAR: ICD-10-CM

## 2020-01-11 DIAGNOSIS — E66.01 MORBID OBESITY WITH BMI OF 50.0-59.9, ADULT (HCC): ICD-10-CM

## 2020-01-11 LAB
ALBUMIN SERPL-MCNC: 3.8 G/DL (ref 3.4–5)
ALBUMIN/GLOB SERPL: 1 {RATIO} (ref 1–2)
ALP LIVER SERPL-CCNC: 64 U/L (ref 52–144)
ALT SERPL-CCNC: 189 U/L (ref 13–56)
ANION GAP SERPL CALC-SCNC: 9 MMOL/L (ref 0–18)
AST SERPL-CCNC: 144 U/L (ref 15–37)
BILIRUB SERPL-MCNC: 0.4 MG/DL (ref 0.1–2)
BUN BLD-MCNC: 13 MG/DL (ref 7–18)
BUN/CREAT SERPL: 16.7 (ref 10–20)
CALCIUM BLD-MCNC: 10.4 MG/DL (ref 8.5–10.1)
CHLORIDE SERPL-SCNC: 108 MMOL/L (ref 98–112)
CHOLEST SMN-MCNC: 208 MG/DL (ref ?–200)
CO2 SERPL-SCNC: 22 MMOL/L (ref 21–32)
CREAT BLD-MCNC: 0.78 MG/DL (ref 0.55–1.02)
DEPRECATED HBV CORE AB SER IA-ACNC: 160.3 NG/ML (ref 12–114)
DEPRECATED RDW RBC AUTO: 39.5 FL (ref 35.1–46.3)
ERYTHROCYTE [DISTWIDTH] IN BLOOD BY AUTOMATED COUNT: 12.8 % (ref 11–15)
EST. AVERAGE GLUCOSE BLD GHB EST-MCNC: 120 MG/DL (ref 68–126)
FOLATE SERPL-MCNC: >20 NG/ML (ref 8.7–?)
GLOBULIN PLAS-MCNC: 3.8 G/DL (ref 2.8–4.4)
GLUCOSE BLD-MCNC: 96 MG/DL (ref 70–99)
HAV IGM SER QL: 2 MG/DL (ref 1.6–2.6)
HBA1C MFR BLD HPLC: 5.8 % (ref ?–5.7)
HCT VFR BLD AUTO: 41.3 % (ref 35–48)
HDLC SERPL-MCNC: 54 MG/DL (ref 40–59)
HGB BLD-MCNC: 13.4 G/DL (ref 12–16)
IRON SERPL-MCNC: 115 UG/DL (ref 50–170)
LDLC SERPL CALC-MCNC: 126 MG/DL (ref ?–100)
M PROTEIN MFR SERPL ELPH: 7.6 G/DL (ref 6.4–8.2)
MCH RBC QN AUTO: 27.6 PG (ref 26–34)
MCHC RBC AUTO-ENTMCNC: 32.4 G/DL (ref 31–37)
MCV RBC AUTO: 85 FL (ref 80–100)
NONHDLC SERPL-MCNC: 154 MG/DL (ref ?–130)
OSMOLALITY SERPL CALC.SUM OF ELEC: 288 MOSM/KG (ref 275–295)
PATIENT FASTING Y/N/NP: YES
PATIENT FASTING Y/N/NP: YES
PHOSPHATE SERPL-MCNC: 3.4 MG/DL (ref 2.5–4.9)
PLATELET # BLD AUTO: 354 10(3)UL (ref 150–450)
POTASSIUM SERPL-SCNC: 4.1 MMOL/L (ref 3.5–5.1)
RBC # BLD AUTO: 4.86 X10(6)UL (ref 3.8–5.3)
SODIUM SERPL-SCNC: 139 MMOL/L (ref 136–145)
TRIGL SERPL-MCNC: 142 MG/DL (ref 30–149)
TSI SER-ACNC: 2.65 MIU/ML (ref 0.36–3.74)
VIT B12 SERPL-MCNC: 584 PG/ML (ref 193–986)
VLDLC SERPL CALC-MCNC: 28 MG/DL (ref 0–30)
WBC # BLD AUTO: 7.7 X10(3) UL (ref 4–11)

## 2020-01-11 PROCEDURE — 82607 VITAMIN B-12: CPT

## 2020-01-11 PROCEDURE — 83735 ASSAY OF MAGNESIUM: CPT

## 2020-01-11 PROCEDURE — 84100 ASSAY OF PHOSPHORUS: CPT

## 2020-01-11 PROCEDURE — 84443 ASSAY THYROID STIM HORMONE: CPT

## 2020-01-11 PROCEDURE — 84425 ASSAY OF VITAMIN B-1: CPT

## 2020-01-11 PROCEDURE — 80061 LIPID PANEL: CPT

## 2020-01-11 PROCEDURE — 85027 COMPLETE CBC AUTOMATED: CPT

## 2020-01-11 PROCEDURE — 83036 HEMOGLOBIN GLYCOSYLATED A1C: CPT

## 2020-01-11 PROCEDURE — 83540 ASSAY OF IRON: CPT

## 2020-01-11 PROCEDURE — 82728 ASSAY OF FERRITIN: CPT

## 2020-01-11 PROCEDURE — 80053 COMPREHEN METABOLIC PANEL: CPT

## 2020-01-11 PROCEDURE — 36415 COLL VENOUS BLD VENIPUNCTURE: CPT

## 2020-01-11 PROCEDURE — 82306 VITAMIN D 25 HYDROXY: CPT

## 2020-01-11 PROCEDURE — 82746 ASSAY OF FOLIC ACID SERUM: CPT

## 2020-01-13 ENCOUNTER — APPOINTMENT (OUTPATIENT)
Dept: LAB | Facility: HOSPITAL | Age: 20
End: 2020-01-13
Attending: ADVANCED PRACTICE MIDWIFE
Payer: COMMERCIAL

## 2020-01-13 ENCOUNTER — TELEPHONE (OUTPATIENT)
Dept: OBGYN CLINIC | Facility: CLINIC | Age: 20
End: 2020-01-13

## 2020-01-13 DIAGNOSIS — Z11.3 SCREEN FOR STD (SEXUALLY TRANSMITTED DISEASE): ICD-10-CM

## 2020-01-13 DIAGNOSIS — Z11.3 SCREEN FOR STD (SEXUALLY TRANSMITTED DISEASE): Primary | ICD-10-CM

## 2020-01-13 LAB — 25(OH)D3 SERPL-MCNC: 15.3 NG/ML (ref 30–100)

## 2020-01-13 PROCEDURE — 87591 N.GONORRHOEAE DNA AMP PROB: CPT

## 2020-01-13 PROCEDURE — 87491 CHLMYD TRACH DNA AMP PROBE: CPT

## 2020-01-13 RX ORDER — ERGOCALCIFEROL (VITAMIN D2) 1250 MCG
50000 CAPSULE ORAL WEEKLY
Qty: 12 CAPSULE | Refills: 2 | Status: SHIPPED | OUTPATIENT
Start: 2020-01-13 | End: 2020-03-31

## 2020-01-13 NOTE — TELEPHONE ENCOUNTER
Notified pt of results & instructions per MES. Pt states she has a pcp already & will contact him/her. Pt req results for gc/ct screening. Advised it was not done. Pt states she desires. Order placed & instructions given.  Pt verbalized an understanding & a

## 2020-01-13 NOTE — TELEPHONE ENCOUNTER
----- Message from Matt Perkins CNM sent at 1/11/2020  5:18 PM CST -----  Please call this patient. Her HgbAIC, lipids and LFTs are all elevated . She should follow up with these labs and her PCP ASAP.   If she needs a referral to a PCP please use Dr Krys Casarez

## 2020-01-14 LAB
C TRACH DNA SPEC QL NAA+PROBE: NEGATIVE
N GONORRHOEA DNA SPEC QL NAA+PROBE: NEGATIVE

## 2020-01-15 ENCOUNTER — TELEPHONE (OUTPATIENT)
Dept: OBGYN CLINIC | Facility: CLINIC | Age: 20
End: 2020-01-15

## 2020-01-16 LAB — VITAMIN B1 (THIAMINE), WHOLE B: 100 NMOL/L

## 2020-02-13 DIAGNOSIS — E66.01 MORBID OBESITY WITH BMI OF 50.0-59.9, ADULT (HCC): Primary | ICD-10-CM

## 2020-03-12 ENCOUNTER — TELEPHONE (OUTPATIENT)
Dept: SURGERY | Facility: CLINIC | Age: 20
End: 2020-03-12

## 2020-03-12 NOTE — TELEPHONE ENCOUNTER
Called patient to discuss Bariatric program and get insurance company phone number as card on file is not clear enough to read.  Patient stated she was interested in surgical track but has now lost 20.8 lbs through medical weight management program and woul

## 2020-03-27 RX ORDER — NORGESTIMATE AND ETHINYL ESTRADIOL
KIT
Qty: 84 TABLET | Refills: 0 | Status: SHIPPED | OUTPATIENT
Start: 2020-03-27 | End: 2020-06-16

## 2020-03-30 DIAGNOSIS — F50.81 BINGE EATING DISORDER: ICD-10-CM

## 2020-04-09 NOTE — PROGRESS NOTES
Virtual Telephone Check-In    Israel Salmon verbally consents to a Virtual/Telephone Check-In visit on 04/09/20. Patient understands and accepts financial responsibility for any deductible, co-insurance and/or co-pays associated with this service.

## 2020-06-16 RX ORDER — NORGESTIMATE AND ETHINYL ESTRADIOL
KIT
Qty: 84 TABLET | Refills: 2 | Status: SHIPPED | OUTPATIENT
Start: 2020-06-16 | End: 2021-02-20

## 2020-07-22 ENCOUNTER — HOSPITAL ENCOUNTER (EMERGENCY)
Facility: HOSPITAL | Age: 20
Discharge: HOME | End: 2020-07-22
Attending: EMERGENCY MEDICINE
Payer: COMMERCIAL

## 2020-07-22 ENCOUNTER — APPOINTMENT (OUTPATIENT)
Dept: ULTRASOUND IMAGING | Facility: HOSPITAL | Age: 20
End: 2020-07-22
Attending: EMERGENCY MEDICINE
Payer: COMMERCIAL

## 2020-07-22 VITALS
WEIGHT: 293 LBS | DIASTOLIC BLOOD PRESSURE: 77 MMHG | RESPIRATION RATE: 16 BRPM | OXYGEN SATURATION: 98 % | HEIGHT: 68 IN | TEMPERATURE: 99 F | BODY MASS INDEX: 44.41 KG/M2 | HEART RATE: 94 BPM | SYSTOLIC BLOOD PRESSURE: 126 MMHG

## 2020-07-22 DIAGNOSIS — R10.11 RUQ PAIN: Primary | ICD-10-CM

## 2020-07-22 LAB
ALBUMIN SERPL-MCNC: 3.6 G/DL (ref 3.4–5)
ALP LIVER SERPL-CCNC: 67 U/L (ref 52–144)
ALT SERPL-CCNC: 68 U/L (ref 13–56)
ANION GAP SERPL CALC-SCNC: 11 MMOL/L (ref 0–18)
AST SERPL-CCNC: 49 U/L (ref 15–37)
B-HCG UR QL: NEGATIVE
BASOPHILS # BLD AUTO: 0.06 X10(3) UL (ref 0–0.2)
BASOPHILS NFR BLD AUTO: 0.6 %
BILIRUB DIRECT SERPL-MCNC: <0.1 MG/DL (ref 0–0.2)
BILIRUB SERPL-MCNC: 0.2 MG/DL (ref 0.1–2)
BILIRUB UR QL: NEGATIVE
BUN BLD-MCNC: 12 MG/DL (ref 7–18)
BUN/CREAT SERPL: 16.7 (ref 10–20)
CALCIUM BLD-MCNC: 10.4 MG/DL (ref 8.5–10.1)
CHLORIDE SERPL-SCNC: 109 MMOL/L (ref 98–112)
CLARITY UR: CLEAR
CO2 SERPL-SCNC: 23 MMOL/L (ref 21–32)
COLOR UR: YELLOW
CREAT BLD-MCNC: 0.72 MG/DL (ref 0.55–1.02)
DEPRECATED RDW RBC AUTO: 40.7 FL (ref 35.1–46.3)
EOSINOPHIL # BLD AUTO: 0.19 X10(3) UL (ref 0–0.7)
EOSINOPHIL NFR BLD AUTO: 2 %
ERYTHROCYTE [DISTWIDTH] IN BLOOD BY AUTOMATED COUNT: 12.9 % (ref 11–15)
GLUCOSE BLD-MCNC: 122 MG/DL (ref 70–99)
GLUCOSE UR-MCNC: NEGATIVE MG/DL
HCT VFR BLD AUTO: 40.9 % (ref 35–48)
HGB BLD-MCNC: 13.8 G/DL (ref 12–16)
HGB UR QL STRIP.AUTO: NEGATIVE
IMM GRANULOCYTES # BLD AUTO: 0.02 X10(3) UL (ref 0–1)
IMM GRANULOCYTES NFR BLD: 0.2 %
KETONES UR-MCNC: NEGATIVE MG/DL
LEUKOCYTE ESTERASE UR QL STRIP.AUTO: NEGATIVE
LIPASE SERPL-CCNC: 135 U/L (ref 73–393)
LYMPHOCYTES # BLD AUTO: 3.04 X10(3) UL (ref 1–4)
LYMPHOCYTES NFR BLD AUTO: 31.6 %
M PROTEIN MFR SERPL ELPH: 7.6 G/DL (ref 6.4–8.2)
MCH RBC QN AUTO: 29.2 PG (ref 26–34)
MCHC RBC AUTO-ENTMCNC: 33.7 G/DL (ref 31–37)
MCV RBC AUTO: 86.5 FL (ref 80–100)
MONOCYTES # BLD AUTO: 0.72 X10(3) UL (ref 0.1–1)
MONOCYTES NFR BLD AUTO: 7.5 %
NEUTROPHILS # BLD AUTO: 5.59 X10 (3) UL (ref 1.5–7.7)
NEUTROPHILS # BLD AUTO: 5.59 X10(3) UL (ref 1.5–7.7)
NEUTROPHILS NFR BLD AUTO: 58.1 %
NITRITE UR QL STRIP.AUTO: NEGATIVE
OSMOLALITY SERPL CALC.SUM OF ELEC: 297 MOSM/KG (ref 275–295)
PH UR: 5 [PH] (ref 5–8)
PLATELET # BLD AUTO: 341 10(3)UL (ref 150–450)
POTASSIUM SERPL-SCNC: 4.1 MMOL/L (ref 3.5–5.1)
PROT UR-MCNC: NEGATIVE MG/DL
RBC # BLD AUTO: 4.73 X10(6)UL (ref 3.8–5.3)
SODIUM SERPL-SCNC: 143 MMOL/L (ref 136–145)
SP GR UR STRIP: 1.02 (ref 1–1.03)
UROBILINOGEN UR STRIP-ACNC: <2
WBC # BLD AUTO: 9.6 X10(3) UL (ref 4–11)

## 2020-07-22 PROCEDURE — 80048 BASIC METABOLIC PNL TOTAL CA: CPT

## 2020-07-22 PROCEDURE — 85025 COMPLETE CBC W/AUTO DIFF WBC: CPT

## 2020-07-22 PROCEDURE — 81003 URINALYSIS AUTO W/O SCOPE: CPT

## 2020-07-22 PROCEDURE — 81003 URINALYSIS AUTO W/O SCOPE: CPT | Performed by: EMERGENCY MEDICINE

## 2020-07-22 PROCEDURE — 80048 BASIC METABOLIC PNL TOTAL CA: CPT | Performed by: EMERGENCY MEDICINE

## 2020-07-22 PROCEDURE — 83690 ASSAY OF LIPASE: CPT | Performed by: EMERGENCY MEDICINE

## 2020-07-22 PROCEDURE — 80076 HEPATIC FUNCTION PANEL: CPT | Performed by: EMERGENCY MEDICINE

## 2020-07-22 PROCEDURE — 36415 COLL VENOUS BLD VENIPUNCTURE: CPT

## 2020-07-22 PROCEDURE — 76705 ECHO EXAM OF ABDOMEN: CPT | Performed by: EMERGENCY MEDICINE

## 2020-07-22 PROCEDURE — 81025 URINE PREGNANCY TEST: CPT

## 2020-07-22 PROCEDURE — 99284 EMERGENCY DEPT VISIT MOD MDM: CPT

## 2020-07-22 PROCEDURE — 85025 COMPLETE CBC W/AUTO DIFF WBC: CPT | Performed by: EMERGENCY MEDICINE

## 2020-07-22 RX ORDER — DICYCLOMINE HCL 20 MG
20 TABLET ORAL 4 TIMES DAILY PRN
Qty: 20 TABLET | Refills: 0 | Status: SHIPPED | OUTPATIENT
Start: 2020-07-22 | End: 2021-02-24

## 2020-07-22 NOTE — ED NOTES
Assumed care of patient from triage. Patient to ED from home for RLQ abdominal pain. Patient reports that pain started this mroning and radiates into right lower back. Patient denies fevers. Patient reports diarrhea for \"a few days,\" and N/V on Monday.  P

## 2020-07-22 NOTE — ED PROVIDER NOTES
Patient Seen in: [facility] AND [facility] Emergency Department      History   No chief complaint on file. Stated Complaint: abdominal pain    HPI    24 yo female presents for eval of abd pain.  Pt reports nausea and vomiting Monday, asoociated it with eationg Normocephalic and atraumatic. Eyes:      Conjunctiva/sclera: Conjunctivae normal.   Neck:      Musculoskeletal: Normal range of motion and neck supple. Cardiovascular:      Rate and Rhythm: Normal rate and regular rhythm.       Heart sounds: Normal hear Available and Reviewed while in ED: US GALLBLADDER (IQJ=[id number])   Final Result    PROCEDURE: US GALLBLADDER (CPT=[id number])         COMPARISON: None.          INDICATIONS: abdominal pain         TECHNIQUE:   The gallbladder was evaluated with grayscale ultrasoun file. to contribute to the complexity of this ED evaluation. Oxygen Saturation: 98% on room air, Normal    Consults: No orders of the defined types were placed in this encounter.       MD Discussions or Sign-Outs: None    Impression:  After review and in

## 2020-10-04 ENCOUNTER — HOSPITAL ENCOUNTER (EMERGENCY)
Facility: HOSPITAL | Age: 20
Discharge: HOME | End: 2020-10-04
Attending: EMERGENCY MEDICINE
Payer: COMMERCIAL

## 2020-10-04 ENCOUNTER — APPOINTMENT (OUTPATIENT)
Dept: CT IMAGING | Facility: HOSPITAL | Age: 20
End: 2020-10-04
Attending: EMERGENCY MEDICINE
Payer: COMMERCIAL

## 2020-10-04 VITALS
RESPIRATION RATE: 20 BRPM | WEIGHT: 293 LBS | BODY MASS INDEX: 45.99 KG/M2 | HEART RATE: 91 BPM | HEIGHT: 67 IN | OXYGEN SATURATION: 97 % | TEMPERATURE: 98 F | SYSTOLIC BLOOD PRESSURE: 144 MMHG | DIASTOLIC BLOOD PRESSURE: 85 MMHG

## 2020-10-04 DIAGNOSIS — R19.7 NAUSEA VOMITING AND DIARRHEA: ICD-10-CM

## 2020-10-04 DIAGNOSIS — R11.2 NAUSEA VOMITING AND DIARRHEA: ICD-10-CM

## 2020-10-04 DIAGNOSIS — R10.9 ACUTE LEFT FLANK PAIN: Primary | ICD-10-CM

## 2020-10-04 PROCEDURE — 81003 URINALYSIS AUTO W/O SCOPE: CPT | Performed by: EMERGENCY MEDICINE

## 2020-10-04 PROCEDURE — 80048 BASIC METABOLIC PNL TOTAL CA: CPT | Performed by: EMERGENCY MEDICINE

## 2020-10-04 PROCEDURE — 96374 THER/PROPH/DIAG INJ IV PUSH: CPT

## 2020-10-04 PROCEDURE — 99284 EMERGENCY DEPT VISIT MOD MDM: CPT

## 2020-10-04 PROCEDURE — 81025 URINE PREGNANCY TEST: CPT

## 2020-10-04 PROCEDURE — 74177 CT ABD & PELVIS W/CONTRAST: CPT | Performed by: EMERGENCY MEDICINE

## 2020-10-04 PROCEDURE — 85025 COMPLETE CBC W/AUTO DIFF WBC: CPT | Performed by: EMERGENCY MEDICINE

## 2020-10-04 RX ORDER — CYCLOBENZAPRINE HCL 10 MG
10 TABLET ORAL 3 TIMES DAILY PRN
Qty: 20 TABLET | Refills: 0 | Status: SHIPPED | OUTPATIENT
Start: 2020-10-04 | End: 2020-10-11

## 2020-10-04 RX ORDER — KETOROLAC TROMETHAMINE 15 MG/ML
15 INJECTION, SOLUTION INTRAMUSCULAR; INTRAVENOUS ONCE
Status: COMPLETED | OUTPATIENT
Start: 2020-10-04 | End: 2020-10-04

## 2020-10-04 RX ORDER — ONDANSETRON 4 MG/1
4 TABLET, ORALLY DISINTEGRATING ORAL EVERY 4 HOURS PRN
Qty: 10 TABLET | Refills: 0 | Status: SHIPPED | OUTPATIENT
Start: 2020-10-04 | End: 2020-10-11

## 2020-10-04 RX ORDER — IBUPROFEN 600 MG/1
600 TABLET ORAL EVERY 8 HOURS PRN
Qty: 30 TABLET | Refills: 0 | Status: SHIPPED | OUTPATIENT
Start: 2020-10-04 | End: 2020-10-11

## 2020-10-04 RX ORDER — METHYLPREDNISOLONE 4 MG/1
TABLET ORAL
Qty: 1 PACKAGE | Refills: 0 | Status: SHIPPED | OUTPATIENT
Start: 2020-10-04 | End: 2021-02-24

## 2020-10-04 NOTE — ED PROVIDER NOTES
Patient Seen in: [facility] AND [facility] Emergency Department      History   Patient presents with:  Abdomen/Flank Pain    Stated Complaint: Abdominal Pain    HPI    70-year-old female presents for complaint of left flank pain for the past 3 to 4 days.   Pain i negative except as noted above.     Physical Exam     ED Triage Vitals   BP 10/04/20 1333 136/82   Pulse 10/04/20 1333 106   Resp 10/04/20 1333 20   Temp 10/04/20 1333 98 °F (36.7 °C)   Temp src 10/04/20 1333 Oral   SpO2 10/04/20 1333 97 %   O2 Device 10/04 URINALYSIS WITH CULTURE REFLEX   CBC WITH DIFFERENTIAL WITH PLATELET    Narrative: The following orders were created for panel order CBC WITH DIFFERENTIAL WITH PLATELET.   Procedure                               Abnormality         Status Radiology) NRDR ([address]), which includes the Dose Index Registry. Oral contrast was not ingested.   FINDINGS: COMMENT: Overall examination quality is degraded by beam hardening artifact throughout the imaged volume secondary to pat foci likely represent bone islands. ABDOMINAL WALL: There is a minute fat-containing umbilical hernia. OTHER: No free air is seen in the abdomen or pelvis. CONCLUSION:  1. No acute intra-abdominal process is identified.  The etiology of the patien R-0

## 2020-10-04 NOTE — ED INITIAL ASSESSMENT (HPI)
Patient complain of left sided abdominal pain that radiates to her left back. + Nausea, vomiting and diarrhea.

## 2020-12-05 ENCOUNTER — HOSPITAL ENCOUNTER (EMERGENCY)
Facility: HOSPITAL | Age: 20
Discharge: HOME | End: 2020-12-05
Attending: EMERGENCY MEDICINE
Payer: COMMERCIAL

## 2020-12-05 VITALS
OXYGEN SATURATION: 98 % | HEART RATE: 102 BPM | BODY MASS INDEX: 45.99 KG/M2 | RESPIRATION RATE: 18 BRPM | DIASTOLIC BLOOD PRESSURE: 106 MMHG | TEMPERATURE: 99 F | SYSTOLIC BLOOD PRESSURE: 156 MMHG | HEIGHT: 67 IN | WEIGHT: 293 LBS

## 2020-12-05 DIAGNOSIS — U07.1 COVID-19: Primary | ICD-10-CM

## 2020-12-05 PROCEDURE — 99282 EMERGENCY DEPT VISIT SF MDM: CPT

## 2020-12-06 NOTE — ED INITIAL ASSESSMENT (HPI)
Pt to er from home after testing + with COVID today. Was swabbed on Thursday. Pt has some body aches and cough. Pt concerned about her lungs bc she has hx of asthma.

## 2020-12-06 NOTE — ED PROVIDER NOTES
Patient Seen in: [facility] AND [facility] Emergency Department      History   Patient presents with:  Testing  Covid    Stated Complaint: testing    HPI  23 yoF with morbid obesity and asthma presents for evaluation of COVID-19.   She tested positive for COVID-1 kg/m²         Physical Exam  Vitals signs and nursing note reviewed. Constitutional:       General: She is not in acute distress. Appearance: She is well-developed. She is obese. She is not ill-appearing.    HENT:      Head: Normocephalic and atraumat possibility of blood work and imaging. At this point as patient appears stable, in no distress, will not obtain further testing. Return precautions given, patient question answered and she is stable for discharge home.             Disposition and Plan

## 2020-12-10 ENCOUNTER — HOSPITAL ENCOUNTER (EMERGENCY)
Facility: HOSPITAL | Age: 20
Discharge: HOME | End: 2020-12-10
Payer: COMMERCIAL

## 2020-12-10 ENCOUNTER — TELEPHONE (OUTPATIENT)
Dept: OBGYN CLINIC | Facility: CLINIC | Age: 20
End: 2020-12-10

## 2020-12-10 ENCOUNTER — APPOINTMENT (OUTPATIENT)
Dept: GENERAL RADIOLOGY | Facility: HOSPITAL | Age: 20
End: 2020-12-10
Attending: NURSE PRACTITIONER
Payer: COMMERCIAL

## 2020-12-10 VITALS
TEMPERATURE: 99 F | DIASTOLIC BLOOD PRESSURE: 76 MMHG | SYSTOLIC BLOOD PRESSURE: 129 MMHG | OXYGEN SATURATION: 95 % | HEIGHT: 67 IN | WEIGHT: 293 LBS | RESPIRATION RATE: 18 BRPM | BODY MASS INDEX: 45.99 KG/M2 | HEART RATE: 105 BPM

## 2020-12-10 DIAGNOSIS — U07.1 COVID-19 VIRUS INFECTION: ICD-10-CM

## 2020-12-10 DIAGNOSIS — R06.00 DYSPNEA, UNSPECIFIED TYPE: Primary | ICD-10-CM

## 2020-12-10 PROCEDURE — 84484 ASSAY OF TROPONIN QUANT: CPT | Performed by: NURSE PRACTITIONER

## 2020-12-10 PROCEDURE — 93010 ELECTROCARDIOGRAM REPORT: CPT | Performed by: INTERNAL MEDICINE

## 2020-12-10 PROCEDURE — 96361 HYDRATE IV INFUSION ADD-ON: CPT

## 2020-12-10 PROCEDURE — 81025 URINE PREGNANCY TEST: CPT

## 2020-12-10 PROCEDURE — 71045 X-RAY EXAM CHEST 1 VIEW: CPT | Performed by: NURSE PRACTITIONER

## 2020-12-10 PROCEDURE — 96374 THER/PROPH/DIAG INJ IV PUSH: CPT

## 2020-12-10 PROCEDURE — 99284 EMERGENCY DEPT VISIT MOD MDM: CPT

## 2020-12-10 PROCEDURE — 85025 COMPLETE CBC W/AUTO DIFF WBC: CPT

## 2020-12-10 PROCEDURE — 93005 ELECTROCARDIOGRAM TRACING: CPT

## 2020-12-10 PROCEDURE — 80048 BASIC METABOLIC PNL TOTAL CA: CPT

## 2020-12-10 RX ORDER — ONDANSETRON 4 MG/1
4 TABLET, ORALLY DISINTEGRATING ORAL EVERY 4 HOURS PRN
Qty: 10 TABLET | Refills: 0 | Status: SHIPPED | OUTPATIENT
Start: 2020-12-10 | End: 2020-12-17

## 2020-12-10 RX ORDER — ONDANSETRON 2 MG/ML
4 INJECTION INTRAMUSCULAR; INTRAVENOUS ONCE
Status: COMPLETED | OUTPATIENT
Start: 2020-12-10 | End: 2020-12-10

## 2020-12-10 NOTE — ED INITIAL ASSESSMENT (HPI)
Patient states that she test positive for COVID on Saturday. Patient states that she is having continues symptoms of fatigue, vomiting and losing weight due to this, shortness of breath.  Patient also states she has abdominal pain and abnormal vaginal bleed

## 2020-12-10 NOTE — TELEPHONE ENCOUNTER
Pt states she tested positive for Covid on Saturday. Pt states she was prescribed Aspirin, Z pack, inhaler, and Tylenol by her PCP. Pt believes medications are causing her to have vaginal bleeding. Pt states she is on OCP and has not skipped any pills.  She

## 2020-12-10 NOTE — TELEPHONE ENCOUNTER
Patient states she is covid positive and was prescribed medication from her family medicine doctor. Patient states she has been bleeding a day or two after she started the medication.  Patient states she is having to change her tampon every 2-3 hours and it

## 2020-12-11 NOTE — ED PROVIDER NOTES
Patient Seen in: [facility] AND [facility] Emergency Department      History   Patient presents with:  Covid  Nausea/Vomiting/Diarrhea    Stated Complaint: Covid     19yo/f with hx of anxiety,asthma,migraines, obesity reports to the ED w complaints of 7 days of Physical Exam  Vitals signs and nursing note reviewed. Constitutional:       General: She is not in acute distress. Appearance: She is well-developed. HENT:      Head: Normocephalic and atraumatic.    Eyes:      Conjunctiva/sclera: Conjuncti Please view results for these tests on the individual orders.    RAINBOW DRAW BLUE   RAINBOW DRAW LAVENDER   RAINBOW DRAW LIGHT GREEN   RAINBOW DRAW GOLD     EKG    NSr no j luis no ectopy normal axis              COMPARISON: [facility], XR

## 2020-12-11 NOTE — ED NOTES
Patient notes exposure to covid on 11/26, symptoms 12/3. Notes nausea and abnormal vaginal bleeding. Notes fatigue. ambulatory in room.

## 2021-02-20 RX ORDER — NORGESTIMATE AND ETHINYL ESTRADIOL 7DAYSX3 LO
1 KIT ORAL DAILY
Qty: 84 TABLET | Refills: 0 | Status: SHIPPED | OUTPATIENT
Start: 2021-02-20 | End: 2021-02-24 | Stop reason: ALTCHOICE

## 2021-02-24 ENCOUNTER — OFFICE VISIT (OUTPATIENT)
Dept: OBGYN CLINIC | Facility: CLINIC | Age: 21
End: 2021-02-24
Payer: COMMERCIAL

## 2021-02-24 VITALS
DIASTOLIC BLOOD PRESSURE: 86 MMHG | SYSTOLIC BLOOD PRESSURE: 138 MMHG | WEIGHT: 293 LBS | HEART RATE: 111 BPM | BODY MASS INDEX: 45.99 KG/M2 | HEIGHT: 67 IN

## 2021-02-24 DIAGNOSIS — Z00.00 PREVENTATIVE HEALTH CARE: ICD-10-CM

## 2021-02-24 DIAGNOSIS — Z11.3 SCREEN FOR STD (SEXUALLY TRANSMITTED DISEASE): ICD-10-CM

## 2021-02-24 DIAGNOSIS — Z30.09 ENCOUNTER FOR GENERAL COUNSELING AND ADVICE ON CONTRACEPTIVE MANAGEMENT: Primary | ICD-10-CM

## 2021-02-24 DIAGNOSIS — E66.01 OBESITY, MORBID, BMI 50 OR HIGHER (HCC): ICD-10-CM

## 2021-02-24 DIAGNOSIS — G43.109 CHRONIC MIGRAINE WITH AURA: ICD-10-CM

## 2021-02-24 DIAGNOSIS — R11.10 CHRONIC VOMITING: ICD-10-CM

## 2021-02-24 DIAGNOSIS — Z30.017 INSERTION OF IMPLANTABLE SUBDERMAL CONTRACEPTIVE: ICD-10-CM

## 2021-02-24 PROCEDURE — 90471 IMMUNIZATION ADMIN: CPT | Performed by: ADVANCED PRACTICE MIDWIFE

## 2021-02-24 PROCEDURE — 81025 URINE PREGNANCY TEST: CPT | Performed by: ADVANCED PRACTICE MIDWIFE

## 2021-02-24 PROCEDURE — 3008F BODY MASS INDEX DOCD: CPT | Performed by: ADVANCED PRACTICE MIDWIFE

## 2021-02-24 PROCEDURE — 3079F DIAST BP 80-89 MM HG: CPT | Performed by: ADVANCED PRACTICE MIDWIFE

## 2021-02-24 PROCEDURE — 99213 OFFICE O/P EST LOW 20 MIN: CPT | Performed by: ADVANCED PRACTICE MIDWIFE

## 2021-02-24 PROCEDURE — 11981 INSERTION DRUG DLVR IMPLANT: CPT | Performed by: ADVANCED PRACTICE MIDWIFE

## 2021-02-24 PROCEDURE — 90686 IIV4 VACC NO PRSV 0.5 ML IM: CPT | Performed by: ADVANCED PRACTICE MIDWIFE

## 2021-02-24 PROCEDURE — 3075F SYST BP GE 130 - 139MM HG: CPT | Performed by: ADVANCED PRACTICE MIDWIFE

## 2021-02-24 RX ORDER — ERGOCALCIFEROL 1.25 MG/1
50000 CAPSULE ORAL WEEKLY
COMMUNITY
Start: 2021-01-29

## 2021-02-24 RX ORDER — LISDEXAMFETAMINE DIMESYLATE 20 MG/1
20 CAPSULE ORAL EVERY MORNING
COMMUNITY
Start: 2021-02-12 | End: 2021-04-15

## 2021-02-25 LAB
C TRACH DNA SPEC QL NAA+PROBE: NEGATIVE
N GONORRHOEA DNA SPEC QL NAA+PROBE: NEGATIVE

## 2021-02-25 NOTE — PROGRESS NOTES
HPI:    Patient ID: Miles Agustin is a 19year old female who presents for an annual exam and OCP refill     New complaints of increase migraines and chronic N/V:  - Migraines with aura: started in middle school and have been getting worse the past cou once a week. • OMEPRAZOLE OR Take by mouth. • CLONAZEPAM OR Take by mouth daily as needed (anxiety ; pt has no taken in more than 4 months).      • Albuterol Sulfate HFA (PROAIR HFA) 108 (90 BASE) MCG/ACT Inhalation Aero Soln Inhale 2 puffs into the INTERNAL    Chronic migraine with aura  -     NEURO - INTERNAL    Obesity, morbid, BMI 50 or higher (HCC)  -     BARIATRICS - INTERNAL  -     CBC, PLATELET; NO DIFFERENTIAL; Future  -     COMP METABOLIC PANEL (14);  Future  -     HEMOGLOBIN A1C; Future  - previously low vit D levels   - Discussed prevention of STIs and safe sex practices  F/u 1 year or prn    Meds This Visit:  Requested Prescriptions      No prescriptions requested or ordered in this encounter       Imaging & Referrals:  FLULAVAL INFLUENZA

## 2021-02-25 NOTE — PROCEDURES
Nexplanon Insertion/Removal    Pregnancy Results: negative from urine test   Birth control method(s) used:   OCP    Consent was obtained from the patient. Insertion:    The patient was positioned with her left arm flexed.     Measurement was taken from h

## 2021-03-01 ENCOUNTER — TELEMEDICINE (OUTPATIENT)
Dept: SURGERY | Facility: CLINIC | Age: 21
End: 2021-03-01
Payer: COMMERCIAL

## 2021-03-01 VITALS — BODY MASS INDEX: 56 KG/M2 | WEIGHT: 293 LBS

## 2021-03-01 DIAGNOSIS — E66.01 MORBID OBESITY WITH BMI OF 50.0-59.9, ADULT (HCC): ICD-10-CM

## 2021-03-01 DIAGNOSIS — R63.5 WEIGHT GAIN: Primary | ICD-10-CM

## 2021-03-01 DIAGNOSIS — K76.0 FATTY LIVER: ICD-10-CM

## 2021-03-01 DIAGNOSIS — F50.81 BINGE EATING DISORDER: ICD-10-CM

## 2021-03-01 DIAGNOSIS — E55.9 VITAMIN D DEFICIENCY: ICD-10-CM

## 2021-03-01 PROCEDURE — 99214 OFFICE O/P EST MOD 30 MIN: CPT | Performed by: NURSE PRACTITIONER

## 2021-03-01 NOTE — PROGRESS NOTES
Virtual Video/Telephone Check-In    [employer] verbally consents to a Virtual Video/Telephone Check-In visit on 03/01/21. Patient has been referred to the [facility] website at [email or web address] to review the yearly Consent to Treat document.     P Medication Sig Dispense Refill   • VYVANSE 20 MG Oral Cap Take 20 mg by mouth every morning.      • aspirin 325 MG Oral Tab EC TAKE 1 TABLET (325 MG) BY MOUTH DAILY WITH BREAKFAST     • ergocalciferol 1.25 MG (77816 UT) Oral Cap Take 50,000 Units by mouth partner: Not on file        Emotionally abused: Not on file        Physically abused: Not on file        Forced sexual activity: Not on file    Other Topics      Concerns:         Service: Not Asked        Blood Transfusions: Not Asked        Caffe malik lau     Nutritional Goals  Limit carbohydrates to 100 gms per day, Eat 100-200 calories within 1 hour of waking  and Eat 3-4 cups of fresh fruits or vegetables daily    Behavior Modifications Reviewed and Discussed  Eat breakfast, Eat 3 meals lifestyle modifications as discussed below. Monitor. Goals for next month:  1. Keep a food log. 2. Drink 48-64 ounces of non-caloric beverages per day. No fruit juices or regular soda.   3. Increase activity-upper body exercises, walk 10 minutes per [employer]

## 2021-03-01 NOTE — PATIENT INSTRUCTIONS
Attend bariatric seminar. To schedule bariatric seminar:   Call [phone] or   Schedule Online at- [email or web address]

## 2021-03-04 ENCOUNTER — TELEPHONE (OUTPATIENT)
Dept: SURGERY | Facility: CLINIC | Age: 21
End: 2021-03-04

## 2021-03-04 NOTE — TELEPHONE ENCOUNTER
Called patient and requested she upload a new copy of her insurance card into [employer]. Insurance card on file is difficult to read so unable to verify benefits for Bariatric Program until new card comes through.  Patient stated she will upload card by catie

## 2021-03-26 ENCOUNTER — TELEPHONE (OUTPATIENT)
Dept: OBGYN CLINIC | Facility: CLINIC | Age: 21
End: 2021-03-26

## 2021-03-26 NOTE — TELEPHONE ENCOUNTER
Unable to leave message due to mailbox full. Patient has overdue labs placed by mes. Please see if patient desires testing.

## 2021-04-14 ENCOUNTER — TELEPHONE (OUTPATIENT)
Dept: OBGYN CLINIC | Facility: CLINIC | Age: 21
End: 2021-04-14

## 2021-04-14 NOTE — TELEPHONE ENCOUNTER
Spoke with pt who reports she had nexplanon inserted on 2/24. Pt states she has been having vaginal bleeding since 3/30. Pt states she changes her tampon about 8 times a day and this morning she soaked through her tampon.  Pt states she is also passing smal

## 2021-04-14 NOTE — TELEPHONE ENCOUNTER
Pt advised per MES she should be seen in office today. 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 Pt states she cannot come in for appt today. Pt scheduled for appt tomorrow. ER precautions reviewed with pt.  Pt agreed and voiced understand

## 2021-04-15 ENCOUNTER — OFFICE VISIT (OUTPATIENT)
Dept: OBGYN CLINIC | Facility: CLINIC | Age: 21
End: 2021-04-15
Payer: COMMERCIAL

## 2021-04-15 VITALS
HEART RATE: 105 BPM | SYSTOLIC BLOOD PRESSURE: 139 MMHG | BODY MASS INDEX: 53 KG/M2 | DIASTOLIC BLOOD PRESSURE: 86 MMHG | WEIGHT: 293 LBS

## 2021-04-15 DIAGNOSIS — Z97.5 NEXPLANON IN PLACE: ICD-10-CM

## 2021-04-15 DIAGNOSIS — N92.6 IRREGULAR MENSTRUAL BLEEDING: Primary | ICD-10-CM

## 2021-04-15 PROCEDURE — 3075F SYST BP GE 130 - 139MM HG: CPT | Performed by: ADVANCED PRACTICE MIDWIFE

## 2021-04-15 PROCEDURE — 99213 OFFICE O/P EST LOW 20 MIN: CPT | Performed by: ADVANCED PRACTICE MIDWIFE

## 2021-04-15 PROCEDURE — 3079F DIAST BP 80-89 MM HG: CPT | Performed by: ADVANCED PRACTICE MIDWIFE

## 2021-04-15 NOTE — PROGRESS NOTES
HPI/Subjective:   Patient ID: Pedro Anton is a 19year old female. Lauro Bermudez is a 19yo G0 who presents with vaginal bleeding concerns. She had nexplanon inserted on 2/24/21. Did not have any bleeding until 3/29/21.  Started with brown spotting but ha Judgment normal.     Nexplanon palpated and intact in left upper arm    Assessment & Plan:   Irregular menstrual bleeding  (primary encounter diagnosis)  Nexplanon in place    No orders of the defined types were placed in this encounter.       Meds This Vis

## 2021-06-30 ENCOUNTER — TELEPHONE (OUTPATIENT)
Dept: SURGERY | Facility: CLINIC | Age: 21
End: 2021-06-30

## 2021-06-30 NOTE — TELEPHONE ENCOUNTER
Per the request of Lopez VEE, called patient and reviewed all insurance information and Bariatric Program. Patient does have Bariatric benefits and it is limited to 2 surgeries per lifetime.  Patient also has Dietitian and psychological testing benefi

## 2021-11-16 ENCOUNTER — HOSPITAL ENCOUNTER (EMERGENCY)
Facility: HOSPITAL | Age: 21
Discharge: HOME | End: 2021-11-17
Attending: EMERGENCY MEDICINE
Payer: COMMERCIAL

## 2021-11-16 DIAGNOSIS — R73.9 HYPERGLYCEMIA: ICD-10-CM

## 2021-11-16 DIAGNOSIS — K29.70 GASTRITIS WITHOUT BLEEDING, UNSPECIFIED CHRONICITY, UNSPECIFIED GASTRITIS TYPE: Primary | ICD-10-CM

## 2021-11-16 PROCEDURE — S0028 INJECTION, FAMOTIDINE, 20 MG: HCPCS | Performed by: EMERGENCY MEDICINE

## 2021-11-16 PROCEDURE — 96375 TX/PRO/DX INJ NEW DRUG ADDON: CPT

## 2021-11-16 PROCEDURE — 81003 URINALYSIS AUTO W/O SCOPE: CPT | Performed by: EMERGENCY MEDICINE

## 2021-11-16 PROCEDURE — 96374 THER/PROPH/DIAG INJ IV PUSH: CPT

## 2021-11-16 PROCEDURE — 83690 ASSAY OF LIPASE: CPT | Performed by: EMERGENCY MEDICINE

## 2021-11-16 PROCEDURE — 96361 HYDRATE IV INFUSION ADD-ON: CPT

## 2021-11-16 PROCEDURE — 81025 URINE PREGNANCY TEST: CPT

## 2021-11-16 PROCEDURE — 80048 BASIC METABOLIC PNL TOTAL CA: CPT | Performed by: EMERGENCY MEDICINE

## 2021-11-16 PROCEDURE — 85025 COMPLETE CBC W/AUTO DIFF WBC: CPT | Performed by: EMERGENCY MEDICINE

## 2021-11-16 PROCEDURE — 99284 EMERGENCY DEPT VISIT MOD MDM: CPT

## 2021-11-16 PROCEDURE — 80076 HEPATIC FUNCTION PANEL: CPT | Performed by: EMERGENCY MEDICINE

## 2021-11-16 RX ORDER — FAMOTIDINE 10 MG/ML
20 INJECTION, SOLUTION INTRAVENOUS ONCE
Status: COMPLETED | OUTPATIENT
Start: 2021-11-16 | End: 2021-11-16

## 2021-11-16 RX ORDER — ONDANSETRON 2 MG/ML
4 INJECTION INTRAMUSCULAR; INTRAVENOUS ONCE
Status: COMPLETED | OUTPATIENT
Start: 2021-11-16 | End: 2021-11-16

## 2021-11-17 ENCOUNTER — APPOINTMENT (OUTPATIENT)
Dept: ULTRASOUND IMAGING | Facility: HOSPITAL | Age: 21
End: 2021-11-17
Attending: EMERGENCY MEDICINE
Payer: COMMERCIAL

## 2021-11-17 VITALS
OXYGEN SATURATION: 99 % | HEART RATE: 90 BPM | TEMPERATURE: 98 F | BODY MASS INDEX: 50 KG/M2 | RESPIRATION RATE: 16 BRPM | DIASTOLIC BLOOD PRESSURE: 82 MMHG | WEIGHT: 293 LBS | SYSTOLIC BLOOD PRESSURE: 133 MMHG

## 2021-11-17 PROCEDURE — 76705 ECHO EXAM OF ABDOMEN: CPT | Performed by: EMERGENCY MEDICINE

## 2021-11-17 RX ORDER — FAMOTIDINE 20 MG/1
20 TABLET ORAL 2 TIMES DAILY PRN
Qty: 30 TABLET | Refills: 0 | Status: SHIPPED | OUTPATIENT
Start: 2021-11-17 | End: 2021-12-17

## 2021-11-17 RX ORDER — ONDANSETRON 4 MG/1
4 TABLET, ORALLY DISINTEGRATING ORAL EVERY 4 HOURS PRN
Qty: 10 TABLET | Refills: 0 | Status: SHIPPED | OUTPATIENT
Start: 2021-11-17 | End: 2021-11-24

## 2021-11-17 NOTE — ED PROVIDER NOTES
Patient Seen in: [facility] AND [facility] Emergency Department      History   Patient presents with:  Abdomen/Flank Pain  Back Pain    Stated Complaint: Back/abdominal pain     Subjective:   HPI  Patient is a 70-year-old female history of anxiety, asthma, ADHD, 98 °F (36.7 °C)   Temp src Temporal   SpO2 97 %   O2 Device None (Room air)       Current:/80   Pulse 110   Temp 98 °F (36.7 °C) (Temporal)   Resp 18   Wt (!) 145.2 kg   LMP 03/29/2021 (Approximate)   SpO2 97%   BMI 50.12 kg/m²         Physical Exam (7) - Abnormal; Notable for the following components:     (*)      (*)     All other components within normal limits   LIPASE - Normal   POCT PREGNANCY URINE - Normal   CBC WITH DIFFERENTIAL WITH PLATELET    Narrative:      The following order Patient is not a known diabetic. Advised to follow-up with PCP for further evaluation.                          Disposition and Plan     Clinical Impression:  Gastritis without bleeding, unspecified chronicity, unspecified gastritis type  (primary encounte

## 2021-11-17 NOTE — ED INITIAL ASSESSMENT (HPI)
Patient presents with abdominal pain since Sunday. Mid abdominal radiating to the back. Notes vomiting/nausea.   No urinary complaints at this time

## 2021-12-01 ENCOUNTER — LAB ENCOUNTER (OUTPATIENT)
Dept: LAB | Facility: HOSPITAL | Age: 21
End: 2021-12-01
Attending: ADVANCED PRACTICE MIDWIFE
Payer: COMMERCIAL

## 2021-12-01 ENCOUNTER — OFFICE VISIT (OUTPATIENT)
Dept: OBGYN CLINIC | Facility: CLINIC | Age: 21
End: 2021-12-01
Payer: COMMERCIAL

## 2021-12-01 VITALS
DIASTOLIC BLOOD PRESSURE: 86 MMHG | HEART RATE: 109 BPM | BODY MASS INDEX: 45.99 KG/M2 | SYSTOLIC BLOOD PRESSURE: 126 MMHG | HEIGHT: 67 IN | WEIGHT: 293 LBS

## 2021-12-01 DIAGNOSIS — R73.03 PREDIABETES: Primary | ICD-10-CM

## 2021-12-01 DIAGNOSIS — R73.03 PREDIABETES: ICD-10-CM

## 2021-12-01 DIAGNOSIS — E66.01 OBESITY, MORBID, BMI 50 OR HIGHER (HCC): ICD-10-CM

## 2021-12-01 DIAGNOSIS — N89.8 VAGINAL DISCHARGE: ICD-10-CM

## 2021-12-01 DIAGNOSIS — E11.65 TYPE 2 DIABETES MELLITUS WITH HYPERGLYCEMIA, WITHOUT LONG-TERM CURRENT USE OF INSULIN (HCC): ICD-10-CM

## 2021-12-01 PROCEDURE — 3046F HEMOGLOBIN A1C LEVEL >9.0%: CPT | Performed by: STUDENT IN AN ORGANIZED HEALTH CARE EDUCATION/TRAINING PROGRAM

## 2021-12-01 PROCEDURE — 80061 LIPID PANEL: CPT

## 2021-12-01 PROCEDURE — 3074F SYST BP LT 130 MM HG: CPT | Performed by: ADVANCED PRACTICE MIDWIFE

## 2021-12-01 PROCEDURE — 84443 ASSAY THYROID STIM HORMONE: CPT

## 2021-12-01 PROCEDURE — 3079F DIAST BP 80-89 MM HG: CPT | Performed by: ADVANCED PRACTICE MIDWIFE

## 2021-12-01 PROCEDURE — 36415 COLL VENOUS BLD VENIPUNCTURE: CPT

## 2021-12-01 PROCEDURE — 83036 HEMOGLOBIN GLYCOSYLATED A1C: CPT

## 2021-12-01 PROCEDURE — 99213 OFFICE O/P EST LOW 20 MIN: CPT | Performed by: ADVANCED PRACTICE MIDWIFE

## 2021-12-01 PROCEDURE — 3008F BODY MASS INDEX DOCD: CPT | Performed by: ADVANCED PRACTICE MIDWIFE

## 2021-12-02 ENCOUNTER — TELEPHONE (OUTPATIENT)
Dept: OBGYN CLINIC | Facility: CLINIC | Age: 21
End: 2021-12-02

## 2021-12-02 NOTE — TELEPHONE ENCOUNTER
Notified pt of results & instructions per MES. Pt has appt w/ pcp tomorrow. Contact # for endocrinology sent via [employer] message per pt request. Advised pt the vag culture results still in process.  Awaiting yeast. Pt verbalized an understanding & agrees w/

## 2021-12-02 NOTE — TELEPHONE ENCOUNTER
----- Message from Cody Lino CNM sent at 12/2/2021 12:35 PM CST -----  Please call her HAIC is in the diabetic range  She will most likely need medication for management.   Can we assist her to get an appointment with servando (Dr Chantelle Doan office whoever has

## 2021-12-03 ENCOUNTER — LAB ENCOUNTER (OUTPATIENT)
Dept: LAB | Age: 21
End: 2021-12-03
Attending: STUDENT IN AN ORGANIZED HEALTH CARE EDUCATION/TRAINING PROGRAM
Payer: COMMERCIAL

## 2021-12-03 ENCOUNTER — OFFICE VISIT (OUTPATIENT)
Dept: FAMILY MEDICINE CLINIC | Facility: CLINIC | Age: 21
End: 2021-12-03
Payer: COMMERCIAL

## 2021-12-03 ENCOUNTER — PATIENT MESSAGE (OUTPATIENT)
Dept: FAMILY MEDICINE CLINIC | Facility: CLINIC | Age: 21
End: 2021-12-03

## 2021-12-03 VITALS
HEART RATE: 101 BPM | WEIGHT: 293 LBS | SYSTOLIC BLOOD PRESSURE: 112 MMHG | HEIGHT: 68 IN | DIASTOLIC BLOOD PRESSURE: 73 MMHG | BODY MASS INDEX: 44.41 KG/M2

## 2021-12-03 DIAGNOSIS — E66.01 OBESITY, MORBID, BMI 50 OR HIGHER (HCC): ICD-10-CM

## 2021-12-03 DIAGNOSIS — Z23 IMMUNIZATION DUE: ICD-10-CM

## 2021-12-03 DIAGNOSIS — E11.65 TYPE 2 DIABETES MELLITUS WITH HYPERGLYCEMIA, WITHOUT LONG-TERM CURRENT USE OF INSULIN (HCC): Primary | ICD-10-CM

## 2021-12-03 DIAGNOSIS — E11.65 TYPE 2 DIABETES MELLITUS WITH HYPERGLYCEMIA, WITHOUT LONG-TERM CURRENT USE OF INSULIN (HCC): ICD-10-CM

## 2021-12-03 PROCEDURE — 99214 OFFICE O/P EST MOD 30 MIN: CPT | Performed by: STUDENT IN AN ORGANIZED HEALTH CARE EDUCATION/TRAINING PROGRAM

## 2021-12-03 PROCEDURE — 3060F POS MICROALBUMINURIA REV: CPT | Performed by: STUDENT IN AN ORGANIZED HEALTH CARE EDUCATION/TRAINING PROGRAM

## 2021-12-03 PROCEDURE — 3074F SYST BP LT 130 MM HG: CPT | Performed by: STUDENT IN AN ORGANIZED HEALTH CARE EDUCATION/TRAINING PROGRAM

## 2021-12-03 PROCEDURE — 3078F DIAST BP <80 MM HG: CPT | Performed by: STUDENT IN AN ORGANIZED HEALTH CARE EDUCATION/TRAINING PROGRAM

## 2021-12-03 PROCEDURE — 82570 ASSAY OF URINE CREATININE: CPT

## 2021-12-03 PROCEDURE — 3061F NEG MICROALBUMINURIA REV: CPT | Performed by: STUDENT IN AN ORGANIZED HEALTH CARE EDUCATION/TRAINING PROGRAM

## 2021-12-03 PROCEDURE — 3008F BODY MASS INDEX DOCD: CPT | Performed by: STUDENT IN AN ORGANIZED HEALTH CARE EDUCATION/TRAINING PROGRAM

## 2021-12-03 PROCEDURE — 82043 UR ALBUMIN QUANTITATIVE: CPT

## 2021-12-03 RX ORDER — PEN NEEDLE, DIABETIC 30 GX3/16"
1 NEEDLE, DISPOSABLE MISCELLANEOUS
Qty: 30 EACH | Refills: 0 | Status: SHIPPED | OUTPATIENT
Start: 2021-12-03

## 2021-12-03 RX ORDER — BLOOD-GLUCOSE SENSOR
1 EACH MISCELLANEOUS
Qty: 9 EACH | Refills: 3 | Status: SHIPPED | OUTPATIENT
Start: 2021-12-03

## 2021-12-03 RX ORDER — METFORMIN HYDROCHLORIDE 500 MG/1
500 TABLET, EXTENDED RELEASE ORAL
Qty: 180 TABLET | Refills: 1 | Status: SHIPPED | OUTPATIENT
Start: 2021-12-03 | End: 2022-01-02

## 2021-12-03 RX ORDER — SEMAGLUTIDE 1.34 MG/ML
0.25 INJECTION, SOLUTION SUBCUTANEOUS
Qty: 3 EACH | Refills: 1 | Status: SHIPPED | OUTPATIENT
Start: 2021-12-03 | End: 2021-12-31

## 2021-12-03 RX ORDER — BLOOD-GLUCOSE TRANSMITTER
1 EACH MISCELLANEOUS
Qty: 1 EACH | Refills: 3 | Status: SHIPPED | OUTPATIENT
Start: 2021-12-03

## 2021-12-03 RX ORDER — BLOOD-GLUCOSE,RECEIVER,CONT
1 EACH MISCELLANEOUS ONCE
Qty: 1 EACH | Refills: 0 | Status: SHIPPED | OUTPATIENT
Start: 2021-12-03 | End: 2021-12-03

## 2021-12-03 NOTE — PATIENT INSTRUCTIONS
How do I get Ozempic for $25 a month? Patients can get the Cloud[employer] by texting BEGIN to [phone] or registering on [employer]. The Ozempic® 3-month savings offer is intended to help patients minimize gaps in therapy.  Eligible IGIGIl

## 2021-12-03 NOTE — PROGRESS NOTES
HPI:    Patient ID: Ashu Vegas is a 19year old female. HPI  Pt presenting to [facility] care. She was recently evaluated by Gyne, completed labs including A1c 9.6.  She reports increased fatigue and weight gain, as well as frequent vaginal symptom • Blood Glucose Monitoring Suppl (ONETOUCH ULTRA 2) w/Device Does not apply Kit Use as directed 1 kit 0   • atorvastatin 20 MG Oral Tab Take 1 tablet (20 mg total) by mouth nightly.  90 tablet 0   • ergocalciferol 1.25 MG (84516 UT) Oral Cap Take 50,000 U and Memory: Cognition normal.             ASSESSMENT/PLAN:   1.  Type 2 diabetes mellitus with hyperglycemia, without long-term current use of insulin ([facility] [address]  Provided information about diabetes diagnosis, management, treatment, complications  - discussed ho INFLUENZA VACCINE QUAD PRESERVATIVE FREE 0.5 ML    Follow-up in 1 month for surveillance. Pt verbalized understanding and agrees with plan.     Orders Placed This Encounter      Lipid Panel      Microalb/Creat Ratio, Random Urine      Flulaval 6 months and

## 2021-12-04 RX ORDER — BLOOD SUGAR DIAGNOSTIC
STRIP MISCELLANEOUS
Qty: 100 STRIP | Refills: 11 | Status: SHIPPED | OUTPATIENT
Start: 2021-12-04

## 2021-12-04 RX ORDER — BLOOD-GLUCOSE METER
EACH MISCELLANEOUS
Qty: 1 KIT | Refills: 0 | Status: SHIPPED | OUTPATIENT
Start: 2021-12-04

## 2021-12-04 RX ORDER — LANCETS 30 GAUGE
1 EACH MISCELLANEOUS DAILY
Qty: 100 EACH | Refills: 11 | Status: SHIPPED | OUTPATIENT
Start: 2021-12-04

## 2021-12-04 NOTE — TELEPHONE ENCOUNTER
From: Israel Salmon  To: Eddi Rojas MD  Sent: 12/3/2021 8:18 PM CST  Subject: Dexcom     Hi , I went to [facility] to  all my prescriptions and unfortunately the dexcom is not covered by my insurance for some reason.  Is there anyway

## 2021-12-04 NOTE — TELEPHONE ENCOUNTER
Routed to Dr Jayla Walton for advise, thanks.       Requested Prescriptions     Pending Prescriptions Disp Refills   • Glucose Blood (ONETOUCH ULTRA) In Vitro Strip 100 strip 11     Sig: Check bld sugar once a day   • OneTouch Delica Lancets 37M Does not apply M

## 2021-12-04 NOTE — PROGRESS NOTES
Patient presents with:  Consult: Pt c/o yeast infection x 3 weeks. HPI:   Patient presents with:  Gyn Exam: Vaginal Janine Coins is a female who presents for vaginal discharge/itch.     ONSET: 3 week(s) ago  PATTERN: worsening    REVIE

## 2021-12-06 ENCOUNTER — TELEPHONE (OUTPATIENT)
Dept: FAMILY MEDICINE CLINIC | Facility: CLINIC | Age: 21
End: 2021-12-06

## 2021-12-06 NOTE — TELEPHONE ENCOUNTER
Order for Diabetic Supplies successfully faxed to [facility] at [phone]. Confirmation # and form placed in scanning.

## 2022-01-06 ENCOUNTER — PATIENT MESSAGE (OUTPATIENT)
Dept: FAMILY MEDICINE CLINIC | Facility: CLINIC | Age: 22
End: 2022-01-06

## 2022-01-07 NOTE — TELEPHONE ENCOUNTER
Prior Auth initiated. observation and assessment/teaching and training/rehabilitation services/medication teaching and assessment/catheter insertion

## 2022-02-28 RX ORDER — ATORVASTATIN CALCIUM 20 MG/1
TABLET, FILM COATED ORAL
Qty: 90 TABLET | Refills: 0 | Status: SHIPPED | OUTPATIENT
Start: 2022-02-28

## 2022-02-28 NOTE — TELEPHONE ENCOUNTER
Please review.  Protocol Failed or has no Protocol  Requested Prescriptions   Pending Prescriptions Disp Refills    ATORVASTATIN 20 MG Oral Tab [Pharmacy Med Name: ATORVASTATIN 20 MG TABLET] 90 tablet 0     Sig: TAKE 1 TABLET BY MOUTH EVERY DAY AT NIGHT        Cholesterol Medication Protocol Failed - 2/28/2022  9:13 AM        Failed - Last ALT < 80       Lab Results   Component Value Date     (H) 11/16/2021             Failed - Last LDL < 130     Lab Results   Component Value Date     (H) 12/01/2021               Passed - ALT in past 12 months        Passed - LDL in past 12 months        Passed - Appointment in past 12 or next 3 months              Recent Outpatient Visits              2 months ago Type 2 diabetes mellitus with hyperglycemia, without long-term current use of insulin [facility])    [facility], [facility], [address] Joanna Rowley MD    Office Visit    2 months ago Prediabetes    [facility], [address], [place], [place]    Office Visit    10 months ago Irregular menstrual bleeding    [facility], [address], Alexia Gonzalez [place], [place]    Office Visit    12 months ago Weight gain    [address] [facility], Marty Huggins APRN    Telemedicine    1 year ago Encounter for general counseling and advice on contraceptive management    [facility] [place], [address], [place], [place]    Office Visit

## 2022-05-18 ENCOUNTER — PATIENT MESSAGE (OUTPATIENT)
Dept: FAMILY MEDICINE CLINIC | Facility: CLINIC | Age: 22
End: 2022-05-18

## 2022-05-18 DIAGNOSIS — E11.65 TYPE 2 DIABETES MELLITUS WITH HYPERGLYCEMIA, WITHOUT LONG-TERM CURRENT USE OF INSULIN (HCC): ICD-10-CM

## 2022-05-19 NOTE — TELEPHONE ENCOUNTER
From: Bernice Rowley  To: Kiki Castellon MD  Sent: 5/18/2022 8:17 PM CDT  Subject: Refill     Hi Doctor,   I need a refill for the ozempic shots. They have been working great for me.  Thank you

## 2022-05-19 NOTE — TELEPHONE ENCOUNTER
DR Coffman=please change SIG if needed,thanks     Please review; protocol failed/no protocol. Requested Prescriptions   Pending Prescriptions Disp Refills    Semaglutide,0.25 or 0.5MG/DOS, (OZEMPIC, 0.25 OR 0.5 MG/DOSE,) 2 MG/1.5ML Subcutaneous Solution Pen-injector 3 each 1     Sig: Inject 0.25 mg into the skin every 7 days for 28 days.         Diabetes Medication Protocol Failed - 5/19/2022 12:33 AM        Failed - Last A1C < 7.5 and within past 6 months     Lab Results   Component Value Date    A1C 9.6 (H) 12/01/2021               Passed - Appointment in past 6 or next 3 months        Passed - GFR Non- > 50     Lab Results   Component Value Date    GFRNAA 123 11/16/2021                 Passed - GFR in the past 12 months               Recent Outpatient Visits              5 months ago Type 2 diabetes mellitus with hyperglycemia, without long-term current use of insulin [facility])    Neel Cope MD    Office Visit    5 months ago Prediabetes    [facility] [place], [place]    Office Visit    1 year ago Irregular menstrual bleeding    [facility] [place], [place], CNM    Office Visit    1 year ago [facility] lizandro    [address] [facility] [address] [facility], Deborah Huggins APRN    Telemedicine    1 year ago Encounter for general counseling and advice on contraceptive management    [facility] [place], [place], [place]    Office Visit

## 2022-05-20 RX ORDER — SEMAGLUTIDE 1.34 MG/ML
0.25 INJECTION, SOLUTION SUBCUTANEOUS
Qty: 3 EACH | Refills: 1 | Status: CANCELLED | OUTPATIENT
Start: 2022-05-20 | End: 2022-06-17

## 2022-06-01 DIAGNOSIS — E11.65 TYPE 2 DIABETES MELLITUS WITH HYPERGLYCEMIA, WITHOUT LONG-TERM CURRENT USE OF INSULIN (HCC): ICD-10-CM

## 2022-06-01 NOTE — TELEPHONE ENCOUNTER
Please review.  Protocol Failed or has no Protocol  Requested Prescriptions   Pending Prescriptions Disp Refills    ATORVASTATIN 20 MG Oral Tab [Pharmacy Med Name: ATORVASTATIN 20 MG TABLET] 90 tablet 0     Sig: TAKE 1 TABLET BY MOUTH EVERY DAY AT NIGHT        Cholesterol Medication Protocol Failed - 6/1/2022 12:44 AM        Failed - Last ALT < 80       Lab Results   Component Value Date     (H) 11/16/2021             Failed - Last LDL < 130     Lab Results   Component Value Date     (H) 12/01/2021               Passed - ALT in past 12 months        Passed - LDL in past 12 months        Passed - Appointment in past 12 or next 3 months              Recent Outpatient Visits              6 months ago Type 2 diabetes mellitus with hyperglycemia, without long-term current use of insulin [facility])    Leatha Sanchez MD    Office Visit    6 months ago Prediabetes    [facility], [address], [place], [place]    Office Visit    1 year ago Irregular menstrual bleeding    [facility], [address], Asmita Gonzalez CNM    Office Visit    1 year ago [address], [place], [place]    Telemedicine    1 year ago Encounter for general counseling and advice on contraceptive management    [facility] [place], [address], [place], [place]    Office Visit

## 2022-06-02 RX ORDER — ATORVASTATIN CALCIUM 20 MG/1
TABLET, FILM COATED ORAL
Qty: 90 TABLET | Refills: 0 | Status: SHIPPED | OUTPATIENT
Start: 2022-06-02

## 2022-09-06 ENCOUNTER — TELEPHONE (OUTPATIENT)
Dept: FAMILY MEDICINE CLINIC | Facility: CLINIC | Age: 22
End: 2022-09-06

## 2022-09-06 DIAGNOSIS — E11.65 TYPE 2 DIABETES MELLITUS WITH HYPERGLYCEMIA, WITHOUT LONG-TERM CURRENT USE OF INSULIN (HCC): ICD-10-CM

## 2022-09-07 RX ORDER — ATORVASTATIN CALCIUM 20 MG/1
20 TABLET, FILM COATED ORAL NIGHTLY
Qty: 30 TABLET | Refills: 0 | Status: SHIPPED | OUTPATIENT
Start: 2022-09-07

## 2022-09-07 NOTE — TELEPHONE ENCOUNTER
Last office visit 12/03/2021. Last labs 12/01/2021. Was supposed to recheck labs in 3 months for elevated lipids and diabetes. Routed to Dr Charlie Heredia, as Dr Thad Singh is not in the office. Please advised on pended refill of atorvastatin.

## 2022-09-13 ENCOUNTER — PATIENT MESSAGE (OUTPATIENT)
Dept: FAMILY MEDICINE CLINIC | Facility: CLINIC | Age: 22
End: 2022-09-13

## 2022-09-13 DIAGNOSIS — E11.65 TYPE 2 DIABETES MELLITUS WITH HYPERGLYCEMIA, WITHOUT LONG-TERM CURRENT USE OF INSULIN (HCC): ICD-10-CM

## 2022-09-13 NOTE — TELEPHONE ENCOUNTER
Routed to Dr Nury Worrell as Dr Josi Nayak is out of the office. Please advise on refill of Ozempic. Patient is out of medication, is at 0.5 mg dose. Order pended for review.     Future Appointments   Date Time Provider Nena Samuel   10/20/2022  6:00 PM MD Ranjan Knightsee 2

## 2022-09-27 NOTE — TELEPHONE ENCOUNTER
Patient called saying her insurance won't cover a 30 day supply of ozempic.  She is asking for a 90 day supply     Routed to covering provider

## 2022-10-20 ENCOUNTER — OFFICE VISIT (OUTPATIENT)
Dept: FAMILY MEDICINE CLINIC | Facility: CLINIC | Age: 22
End: 2022-10-20
Payer: COMMERCIAL

## 2022-10-20 VITALS
RESPIRATION RATE: 16 BRPM | DIASTOLIC BLOOD PRESSURE: 82 MMHG | HEIGHT: 68 IN | HEART RATE: 121 BPM | SYSTOLIC BLOOD PRESSURE: 153 MMHG | WEIGHT: 293 LBS | BODY MASS INDEX: 44.41 KG/M2

## 2022-10-20 DIAGNOSIS — E11.65 TYPE 2 DIABETES MELLITUS WITH HYPERGLYCEMIA, WITHOUT LONG-TERM CURRENT USE OF INSULIN (HCC): ICD-10-CM

## 2022-10-20 DIAGNOSIS — E66.01 OBESITY, MORBID, BMI 50 OR HIGHER (HCC): ICD-10-CM

## 2022-10-20 DIAGNOSIS — B07.9 WART OF HAND: ICD-10-CM

## 2022-10-20 DIAGNOSIS — Z00.00 WELL ADULT EXAM: Primary | ICD-10-CM

## 2022-10-20 DIAGNOSIS — Z23 IMMUNIZATION DUE: ICD-10-CM

## 2022-10-20 LAB
CARTRIDGE EXPIRATION DATE: ABNORMAL DATE
CARTRIDGE LOT#: ABNORMAL NUMERIC
HEMOGLOBIN A1C: 8.8 % (ref 4.3–5.6)

## 2022-10-20 PROCEDURE — 90472 IMMUNIZATION ADMIN EACH ADD: CPT | Performed by: STUDENT IN AN ORGANIZED HEALTH CARE EDUCATION/TRAINING PROGRAM

## 2022-10-20 PROCEDURE — 90471 IMMUNIZATION ADMIN: CPT | Performed by: STUDENT IN AN ORGANIZED HEALTH CARE EDUCATION/TRAINING PROGRAM

## 2022-10-20 PROCEDURE — 3079F DIAST BP 80-89 MM HG: CPT | Performed by: STUDENT IN AN ORGANIZED HEALTH CARE EDUCATION/TRAINING PROGRAM

## 2022-10-20 PROCEDURE — 3077F SYST BP >= 140 MM HG: CPT | Performed by: STUDENT IN AN ORGANIZED HEALTH CARE EDUCATION/TRAINING PROGRAM

## 2022-10-20 PROCEDURE — 99395 PREV VISIT EST AGE 18-39: CPT | Performed by: STUDENT IN AN ORGANIZED HEALTH CARE EDUCATION/TRAINING PROGRAM

## 2022-10-20 PROCEDURE — 90677 PCV20 VACCINE IM: CPT | Performed by: STUDENT IN AN ORGANIZED HEALTH CARE EDUCATION/TRAINING PROGRAM

## 2022-10-20 PROCEDURE — 83036 HEMOGLOBIN GLYCOSYLATED A1C: CPT | Performed by: STUDENT IN AN ORGANIZED HEALTH CARE EDUCATION/TRAINING PROGRAM

## 2022-10-20 PROCEDURE — 3052F HG A1C>EQUAL 8.0%<EQUAL 9.0%: CPT | Performed by: STUDENT IN AN ORGANIZED HEALTH CARE EDUCATION/TRAINING PROGRAM

## 2022-10-20 PROCEDURE — 90715 TDAP VACCINE 7 YRS/> IM: CPT | Performed by: STUDENT IN AN ORGANIZED HEALTH CARE EDUCATION/TRAINING PROGRAM

## 2022-10-20 PROCEDURE — 3008F BODY MASS INDEX DOCD: CPT | Performed by: STUDENT IN AN ORGANIZED HEALTH CARE EDUCATION/TRAINING PROGRAM

## 2022-10-20 PROCEDURE — 90686 IIV4 VACC NO PRSV 0.5 ML IM: CPT | Performed by: STUDENT IN AN ORGANIZED HEALTH CARE EDUCATION/TRAINING PROGRAM

## 2022-10-20 RX ORDER — METFORMIN HYDROCHLORIDE 500 MG/1
TABLET, EXTENDED RELEASE ORAL
COMMUNITY
Start: 2022-05-31

## 2022-10-20 RX ORDER — FLASH GLUCOSE SCANNING READER
1 EACH MISCELLANEOUS AS DIRECTED
Qty: 1 EACH | Refills: 0 | Status: SHIPPED | OUTPATIENT
Start: 2022-10-20

## 2022-10-20 RX ORDER — FLASH GLUCOSE SENSOR
1 KIT MISCELLANEOUS
Qty: 6 EACH | Refills: 1 | Status: SHIPPED | OUTPATIENT
Start: 2022-10-20

## 2022-10-20 RX ORDER — LISDEXAMFETAMINE DIMESYLATE 20 MG/1
20 CAPSULE ORAL EVERY MORNING
COMMUNITY
Start: 2022-09-23 | End: 2022-10-20

## 2022-10-20 RX ORDER — CLONAZEPAM 0.25 MG/1
TABLET, ORALLY DISINTEGRATING ORAL
COMMUNITY
Start: 2022-06-09

## 2023-01-30 DIAGNOSIS — E11.65 TYPE 2 DIABETES MELLITUS WITH HYPERGLYCEMIA, WITHOUT LONG-TERM CURRENT USE OF INSULIN (HCC): ICD-10-CM

## 2023-01-31 NOTE — TELEPHONE ENCOUNTER
Please review. Protocol Failed or has No Protocol. Requested Prescriptions   Pending Prescriptions Disp Refills    semaglutide 2 MG/1.5ML Subcutaneous Solution Pen-injector 12 each 0     Sig: Inject 0.5 mg into the skin once a week.        Diabetes Medication Protocol Failed - 1/30/2023 10:24 AM        Failed - Last A1C < 7.5 and within past 6 months     Lab Results   Component Value Date    A1C 8.8 (A) 10/20/2022             Failed - EGFRCR or GFRNAA > 50     GFR Evaluation            Failed - GFR in the past 12 months        Passed - In person appointment or virtual visit in the past 6 mos or appointment in next 3 mos     Recent Outpatient Visits              3 months ago Well adult exam    Latrice Early MD    Office Visit    1 year ago Type 2 diabetes mellitus with hyperglycemia, without long-term current use of insulin [facility])    Jeny Palacio, Nikhil Paez MD    Office Visit    1 year ago Prediabetes    [address] [facility],[address], [address], [address] [facility], [facility] [place]     Office Visit    1 year ago Irregular menstrual bleeding    [facility], [address], [address] Ty Cortez CNM    Office Visit    1 year ago [facility]    [address] [facility], Sujey Huggins, [address] [facility] Outpatient Visits              3 months ago Well adult exam    Thais Palacio MD    Office Visit    1 year ago Type 2 diabetes mellitus with hyperglycemia, without long-term current use of insulin [facility])    Jeny Palacio, Nikhil Paez MD    Office Visit    1 year ago Prediabetes    [facility], [address], [address] [facility] [place], [facility]    Office Visit    1 year ago Irregular menstrual bleeding    [facility], [address], [place] [facility] [place], [place]RISTOF[place]    Office Visit    1 year ago Weight gain    [address] [facility], Sujey Huggins, APRN    Telemedicine

## 2023-04-17 ENCOUNTER — TELEPHONE (OUTPATIENT)
Dept: FAMILY MEDICINE CLINIC | Facility: CLINIC | Age: 23
End: 2023-04-17

## 2023-04-17 DIAGNOSIS — E11.65 TYPE 2 DIABETES MELLITUS WITH HYPERGLYCEMIA, WITHOUT LONG-TERM CURRENT USE OF INSULIN (HCC): ICD-10-CM

## 2023-04-17 NOTE — TELEPHONE ENCOUNTER
Pt stated last visit Ozempic was increased from o.5 mg to 1 mg  , need new RX in order for INS to cover   Next dose due on Sunday

## 2023-06-02 ENCOUNTER — TELEPHONE (OUTPATIENT)
Dept: FAMILY MEDICINE CLINIC | Facility: CLINIC | Age: 23
End: 2023-06-02

## 2023-06-02 NOTE — TELEPHONE ENCOUNTER
Spoke with pt,  verified, pt stated she was on Vyvanse before, pt has ADD, binge eating. Pt stated she will need Rx again as she has a lot of stuff going on her life. She was doing a lot of task but nothing was done. She is looking for rx ref, pt was advised to set up an appt prior restarting meds, pt agreed and appt made. Pt was advised if sx persist or gets worse to go to IC, she agreed and stated understanding.    Vyvanse last ref 22, lov 10-20-22           Future Appointments   Date Time Provider Nena Samuel   2023 10:20 AM DANIEL Flores [facility] Alondra Farr

## 2023-06-05 ENCOUNTER — LAB ENCOUNTER (OUTPATIENT)
Dept: LAB | Age: 23
End: 2023-06-05
Payer: COMMERCIAL

## 2023-06-05 DIAGNOSIS — E11.65 TYPE 2 DIABETES MELLITUS WITH HYPERGLYCEMIA, WITHOUT LONG-TERM CURRENT USE OF INSULIN (HCC): ICD-10-CM

## 2023-06-05 DIAGNOSIS — Z00.00 WELL ADULT EXAM: ICD-10-CM

## 2023-06-05 PROBLEM — N64.4 BREAST PAIN: Status: ACTIVE | Noted: 2023-06-05

## 2023-06-05 PROBLEM — F90.9 ATTENTION DEFICIT HYPERACTIVITY DISORDER (ADHD): Status: ACTIVE | Noted: 2023-06-05

## 2023-06-05 LAB
DEPRECATED RDW RBC AUTO: 38.3 FL (ref 35.1–46.3)
ERYTHROCYTE [DISTWIDTH] IN BLOOD BY AUTOMATED COUNT: 12.6 % (ref 11–15)
EST. AVERAGE GLUCOSE BLD GHB EST-MCNC: 212 MG/DL (ref 68–126)
HBA1C MFR BLD: 9 % (ref ?–5.7)
HCT VFR BLD AUTO: 41.9 %
HGB BLD-MCNC: 13.9 G/DL
MCH RBC QN AUTO: 28.1 PG (ref 26–34)
MCHC RBC AUTO-ENTMCNC: 33.2 G/DL (ref 31–37)
MCV RBC AUTO: 84.6 FL
PLATELET # BLD AUTO: 308 10(3)UL (ref 150–450)
RBC # BLD AUTO: 4.95 X10(6)UL
VIT D+METAB SERPL-MCNC: 10.3 NG/ML (ref 30–100)
WBC # BLD AUTO: 8 X10(3) UL (ref 4–11)

## 2023-06-05 PROCEDURE — 80061 LIPID PANEL: CPT

## 2023-06-05 PROCEDURE — 82306 VITAMIN D 25 HYDROXY: CPT

## 2023-06-05 PROCEDURE — 80053 COMPREHEN METABOLIC PANEL: CPT

## 2023-06-05 PROCEDURE — 3052F HG A1C>EQUAL 8.0%<EQUAL 9.0%: CPT

## 2023-06-05 PROCEDURE — 83036 HEMOGLOBIN GLYCOSYLATED A1C: CPT

## 2023-06-05 PROCEDURE — 84443 ASSAY THYROID STIM HORMONE: CPT

## 2023-06-05 PROCEDURE — 36415 COLL VENOUS BLD VENIPUNCTURE: CPT

## 2023-06-05 PROCEDURE — 85027 COMPLETE CBC AUTOMATED: CPT

## 2023-06-06 LAB
ALBUMIN SERPL-MCNC: 3.8 G/DL (ref 3.4–5)
ALBUMIN/GLOB SERPL: 1.1 {RATIO} (ref 1–2)
ALP LIVER SERPL-CCNC: 74 U/L
ALT SERPL-CCNC: 169 U/L
ANION GAP SERPL CALC-SCNC: 9 MMOL/L (ref 0–18)
AST SERPL-CCNC: 104 U/L (ref 15–37)
BILIRUB SERPL-MCNC: 0.4 MG/DL (ref 0.1–2)
BUN BLD-MCNC: 8 MG/DL (ref 7–18)
BUN/CREAT SERPL: 12.1 (ref 10–20)
CALCIUM BLD-MCNC: 10.5 MG/DL (ref 8.5–10.1)
CHLORIDE SERPL-SCNC: 106 MMOL/L (ref 98–112)
CHOLEST SERPL-MCNC: 149 MG/DL (ref ?–200)
CO2 SERPL-SCNC: 24 MMOL/L (ref 21–32)
CREAT BLD-MCNC: 0.66 MG/DL
FASTING PATIENT LIPID ANSWER: NO
FASTING STATUS PATIENT QL REPORTED: NO
GFR SERPLBLD BASED ON 1.73 SQ M-ARVRAT: 125 ML/MIN/1.73M2 (ref 60–?)
GLOBULIN PLAS-MCNC: 3.4 G/DL (ref 2.8–4.4)
GLUCOSE BLD-MCNC: 315 MG/DL (ref 70–99)
HDLC SERPL-MCNC: 40 MG/DL (ref 40–59)
LDLC SERPL CALC-MCNC: 80 MG/DL (ref ?–100)
NONHDLC SERPL-MCNC: 109 MG/DL (ref ?–130)
OSMOLALITY SERPL CALC.SUM OF ELEC: 298 MOSM/KG (ref 275–295)
POTASSIUM SERPL-SCNC: 4 MMOL/L (ref 3.5–5.1)
PROT SERPL-MCNC: 7.2 G/DL (ref 6.4–8.2)
SODIUM SERPL-SCNC: 139 MMOL/L (ref 136–145)
TRIGL SERPL-MCNC: 172 MG/DL (ref 30–149)
TSI SER-ACNC: 1.18 MIU/ML (ref 0.36–3.74)
VLDLC SERPL CALC-MCNC: 27 MG/DL (ref 0–30)

## 2023-06-07 ENCOUNTER — TELEPHONE (OUTPATIENT)
Dept: FAMILY MEDICINE CLINIC | Facility: CLINIC | Age: 23
End: 2023-06-07

## 2023-06-07 ENCOUNTER — PATIENT MESSAGE (OUTPATIENT)
Dept: FAMILY MEDICINE CLINIC | Facility: CLINIC | Age: 23
End: 2023-06-07

## 2023-06-07 NOTE — TELEPHONE ENCOUNTER
Prior authorization for invokana was done through [employer].  It can take 1-5 business days for a decision to come back

## 2023-06-08 NOTE — TELEPHONE ENCOUNTER
From: José Miguel Alvarez  To: Ruby Sender, DANIEL  Sent: 6/7/2023 1:53 PM CDT  Subject: Medication     Hi, so will I be put on new medications?

## 2023-06-08 NOTE — TELEPHONE ENCOUNTER
Prior authorization for invokana has been denied. Please refer to denial letter that was faxed to the office. Denied    Request Reference Number: [id number]. INVOKANA TAB 100MG is denied for not meeting the prior authorization requirement(s). Details of this decision are in the notice attached below or have been faxed to you. Case ID: [id number]      Payer:  [employer]   Electronic appeal:  Not supported   Appeals are not supported through ePA. Please refer to the fax case notice for appeals information and instructions.    View History

## 2023-09-02 ENCOUNTER — OFFICE VISIT (OUTPATIENT)
Dept: FAMILY MEDICINE CLINIC | Facility: CLINIC | Age: 23
End: 2023-09-02

## 2023-09-02 VITALS
BODY MASS INDEX: 44.41 KG/M2 | HEIGHT: 68 IN | DIASTOLIC BLOOD PRESSURE: 72 MMHG | TEMPERATURE: 98 F | OXYGEN SATURATION: 97 % | SYSTOLIC BLOOD PRESSURE: 128 MMHG | WEIGHT: 293 LBS | RESPIRATION RATE: 18 BRPM | HEART RATE: 101 BPM

## 2023-09-02 DIAGNOSIS — N89.8 VAGINAL DISCHARGE: Primary | ICD-10-CM

## 2023-09-02 DIAGNOSIS — F41.9 ANXIETY: ICD-10-CM

## 2023-09-02 DIAGNOSIS — E11.65 TYPE 2 DIABETES MELLITUS WITH HYPERGLYCEMIA, WITHOUT LONG-TERM CURRENT USE OF INSULIN (HCC): ICD-10-CM

## 2023-09-02 DIAGNOSIS — J30.1 SEASONAL ALLERGIC RHINITIS DUE TO POLLEN: ICD-10-CM

## 2023-09-02 LAB
CARTRIDGE EXPIRATION DATE: ABNORMAL DATE
CARTRIDGE LOT#: 603 NUMERIC
HEMOGLOBIN A1C: 10 % (ref 4.3–5.6)

## 2023-09-02 PROCEDURE — 3008F BODY MASS INDEX DOCD: CPT

## 2023-09-02 PROCEDURE — 3074F SYST BP LT 130 MM HG: CPT

## 2023-09-02 PROCEDURE — 83036 HEMOGLOBIN GLYCOSYLATED A1C: CPT

## 2023-09-02 PROCEDURE — 99213 OFFICE O/P EST LOW 20 MIN: CPT

## 2023-09-02 PROCEDURE — 3078F DIAST BP <80 MM HG: CPT

## 2023-09-02 RX ORDER — FLUCONAZOLE 150 MG/1
150 TABLET ORAL ONCE
Qty: 1 TABLET | Refills: 0 | Status: SHIPPED | OUTPATIENT
Start: 2023-09-02 | End: 2023-09-02

## 2023-09-02 RX ORDER — TIRZEPATIDE 2.5 MG/.5ML
2.5 INJECTION, SOLUTION SUBCUTANEOUS WEEKLY
Qty: 2 ML | Refills: 0 | Status: SHIPPED | OUTPATIENT
Start: 2023-09-02 | End: 2023-09-05

## 2023-09-02 RX ORDER — FLASH GLUCOSE SENSOR
KIT MISCELLANEOUS
Qty: 2 EACH | Refills: 2 | Status: SHIPPED | OUTPATIENT
Start: 2023-09-02 | End: 2023-09-05

## 2023-09-02 RX ORDER — FLASH GLUCOSE SCANNING READER
EACH MISCELLANEOUS
Qty: 2 EACH | Refills: 2 | Status: SHIPPED | OUTPATIENT
Start: 2023-09-02 | End: 2023-09-05

## 2023-09-02 RX ORDER — CLONAZEPAM 0.5 MG/1
0.5 TABLET ORAL NIGHTLY
Qty: 30 TABLET | Refills: 0 | Status: SHIPPED | OUTPATIENT
Start: 2023-09-02

## 2023-09-05 ENCOUNTER — TELEPHONE (OUTPATIENT)
Dept: FAMILY MEDICINE CLINIC | Facility: CLINIC | Age: 23
End: 2023-09-05

## 2023-09-05 ENCOUNTER — NURSE TRIAGE (OUTPATIENT)
Dept: FAMILY MEDICINE CLINIC | Facility: CLINIC | Age: 23
End: 2023-09-05

## 2023-09-05 ENCOUNTER — PATIENT MESSAGE (OUTPATIENT)
Dept: FAMILY MEDICINE CLINIC | Facility: CLINIC | Age: 23
End: 2023-09-05

## 2023-09-05 RX ORDER — TIRZEPATIDE 2.5 MG/.5ML
2.5 INJECTION, SOLUTION SUBCUTANEOUS WEEKLY
Qty: 2 ML | Refills: 0 | Status: SHIPPED | OUTPATIENT
Start: 2023-09-05

## 2023-09-05 NOTE — TELEPHONE ENCOUNTER
Pt is requesting the [facility] [place] sent to [place] on [address]. Advised that it was already sent there.

## 2023-09-05 NOTE — TELEPHONE ENCOUNTER
Advise patient that external vaginal irritation can occur up to one week after treatment. If patient has more terconazole to apply externally daily for 5 additional days that may be helpful. If not, please resend terconazole script.

## 2023-09-05 NOTE — TELEPHONE ENCOUNTER
Call from [facility] in Lab:  Reports the wrong swabs were used for vaginal specimen. A dry swab was used, but supposed to be swab with foam on the bottom. Will need to redo testing. Lisa SANCHEZ, please advise? Patient seen in office 9/02/2023.

## 2023-09-05 NOTE — TELEPHONE ENCOUNTER
Pt contacted and informed her of Kevon Saenz message. Pt states she does not have any more terconazole. Informed Pt will resend new script for 5 additional days. Pt verbalized understanding. Pharmacy verified.

## 2023-09-05 NOTE — TELEPHONE ENCOUNTER
Pt states [facility] does not have Mounjaro in stock at this time.   Requesting Rx to be transferred to [facility] in [place].  Rx transferred per request.

## 2023-09-05 NOTE — TELEPHONE ENCOUNTER
Freestyle nitin 14 has been discontinued and now need a script for freestyle nitin 2 sensor and device.

## 2023-09-05 NOTE — TELEPHONE ENCOUNTER
Please inform patient. Patient given script for terconazole in office. Inquire if vaginal discharge has improved.

## 2023-09-05 NOTE — TELEPHONE ENCOUNTER
Pt contacted. Pt states no longer having vaginal discharge and has improved a little, but feels a little raw when wiping. Pt states she may need more medication. Pt took last dose last night which is the 3rd dose when last seen on Saturday. Pt made aware she may need to return. Pt states it will be hard for her to come back to the office at this time. Please advise.

## 2023-09-09 ENCOUNTER — PATIENT MESSAGE (OUTPATIENT)
Dept: FAMILY MEDICINE CLINIC | Facility: CLINIC | Age: 23
End: 2023-09-09

## 2023-09-09 DIAGNOSIS — E11.65 TYPE 2 DIABETES MELLITUS WITH HYPERGLYCEMIA, WITHOUT LONG-TERM CURRENT USE OF INSULIN (HCC): ICD-10-CM

## 2023-09-09 DIAGNOSIS — F41.9 ANXIETY: ICD-10-CM

## 2023-09-09 DIAGNOSIS — E11.65 TYPE 2 DIABETES MELLITUS WITH HYPERGLYCEMIA (HCC): ICD-10-CM

## 2023-09-09 DIAGNOSIS — J45.909 ASTHMA: ICD-10-CM

## 2023-09-13 RX ORDER — CLONAZEPAM 0.5 MG/1
0.5 TABLET ORAL NIGHTLY
Qty: 30 TABLET | Refills: 0 | Status: SHIPPED | OUTPATIENT
Start: 2023-09-13

## 2023-09-13 RX ORDER — ALBUTEROL SULFATE 90 UG/1
2 AEROSOL, METERED RESPIRATORY (INHALATION) EVERY 4 HOURS PRN
Qty: 2 EACH | Refills: 1 | Status: SHIPPED | OUTPATIENT
Start: 2023-09-13

## 2023-09-13 RX ORDER — ATORVASTATIN CALCIUM 20 MG/1
20 TABLET, FILM COATED ORAL NIGHTLY
Qty: 90 TABLET | Refills: 1 | Status: SHIPPED | OUTPATIENT
Start: 2023-09-13

## 2023-09-13 RX ORDER — TIRZEPATIDE 2.5 MG/.5ML
2.5 INJECTION, SOLUTION SUBCUTANEOUS WEEKLY
Qty: 2 ML | Refills: 0 | Status: SHIPPED | OUTPATIENT
Start: 2023-09-13

## 2023-09-13 RX ORDER — METFORMIN HYDROCHLORIDE 500 MG/1
500 TABLET, EXTENDED RELEASE ORAL
Qty: 90 TABLET | Refills: 1 | Status: SHIPPED | OUTPATIENT
Start: 2023-09-13 | End: 2024-03-11

## 2023-09-25 RX ORDER — TIRZEPATIDE 2.5 MG/.5ML
2.5 INJECTION, SOLUTION SUBCUTANEOUS WEEKLY
Qty: 2 ML | Refills: 0 | OUTPATIENT
Start: 2023-09-25

## 2023-09-30 ENCOUNTER — PATIENT MESSAGE (OUTPATIENT)
Dept: FAMILY MEDICINE CLINIC | Facility: CLINIC | Age: 23
End: 2023-09-30

## 2023-10-01 NOTE — TELEPHONE ENCOUNTER
Please advise on dose increase per [employer]. Disp Refills Start End    Tirzepatide [facility]) 2.5 MG/0.5ML Subcutaneous Solution Pen-injector 2 mL 0 9/13/2023     Sig - Route: Inject 2.5 mg into the skin once a week.  - Subcutaneous    Sent to pharmacy as: Law Cope 2.5 MG/0.5ML Subcutaneous Solution Pen-injector (Tirzepatide)    E-Prescribing Status: Receipt confirmed by pharmacy (9/13/2023  8:16 AM CDT)

## 2023-10-02 RX ORDER — TIRZEPATIDE 5 MG/.5ML
1 INJECTION, SOLUTION SUBCUTANEOUS WEEKLY
Qty: 2 ML | Refills: 1 | Status: SHIPPED | OUTPATIENT
Start: 2023-10-02 | End: 2023-10-03

## 2023-10-03 RX ORDER — TIRZEPATIDE 5 MG/.5ML
1 INJECTION, SOLUTION SUBCUTANEOUS WEEKLY
Qty: 2 ML | Refills: 1 | Status: SHIPPED | OUTPATIENT
Start: 2023-10-03

## 2023-10-03 NOTE — TELEPHONE ENCOUNTER
Patient stated that the mounjaro went to the wrong pharmacy. Needed to go to [address] not [facility]. Pharmacy has been on hold with the other pharmacy but not able to get through. Updated pharmacies in profile. Rx resent to [address]. Called [facility] pharmacy Pamela Kingsley and cancelled the [facility].

## 2023-10-19 ENCOUNTER — NURSE TRIAGE (OUTPATIENT)
Dept: FAMILY MEDICINE CLINIC | Facility: CLINIC | Age: 23
End: 2023-10-19

## 2023-10-19 ENCOUNTER — PATIENT OUTREACH (OUTPATIENT)
Dept: CASE MANAGEMENT | Age: 23
End: 2023-10-19

## 2023-10-19 ENCOUNTER — OFFICE VISIT (OUTPATIENT)
Dept: FAMILY MEDICINE CLINIC | Facility: CLINIC | Age: 23
End: 2023-10-19
Payer: COMMERCIAL

## 2023-10-19 VITALS
BODY MASS INDEX: 44.41 KG/M2 | HEART RATE: 102 BPM | DIASTOLIC BLOOD PRESSURE: 84 MMHG | RESPIRATION RATE: 20 BRPM | TEMPERATURE: 99 F | HEIGHT: 68 IN | WEIGHT: 293 LBS | OXYGEN SATURATION: 98 % | SYSTOLIC BLOOD PRESSURE: 132 MMHG

## 2023-10-19 DIAGNOSIS — J06.9 VIRAL URI WITH COUGH: Primary | ICD-10-CM

## 2023-10-19 DIAGNOSIS — Z01.89 PATIENT REQUEST FOR DIAGNOSTIC TESTING: ICD-10-CM

## 2023-10-19 DIAGNOSIS — J45.901 MILD ASTHMA EXACERBATION: ICD-10-CM

## 2023-10-19 LAB
CONTROL LINE PRESENT WITH A CLEAR BACKGROUND (YES/NO): YES YES/NO
KIT LOT #: NORMAL NUMERIC
STREP GRP A CUL-SCR: NEGATIVE

## 2023-10-19 PROCEDURE — 3008F BODY MASS INDEX DOCD: CPT | Performed by: PHYSICIAN ASSISTANT

## 2023-10-19 PROCEDURE — 3075F SYST BP GE 130 - 139MM HG: CPT | Performed by: PHYSICIAN ASSISTANT

## 2023-10-19 PROCEDURE — 87880 STREP A ASSAY W/OPTIC: CPT | Performed by: PHYSICIAN ASSISTANT

## 2023-10-19 PROCEDURE — 3079F DIAST BP 80-89 MM HG: CPT | Performed by: PHYSICIAN ASSISTANT

## 2023-10-19 PROCEDURE — 99214 OFFICE O/P EST MOD 30 MIN: CPT | Performed by: PHYSICIAN ASSISTANT

## 2023-10-19 PROCEDURE — 87637 SARSCOV2&INF A&B&RSV AMP PRB: CPT | Performed by: PHYSICIAN ASSISTANT

## 2023-10-19 RX ORDER — ALBUTEROL SULFATE 2.5 MG/3ML
2.5 SOLUTION RESPIRATORY (INHALATION) EVERY 4 HOURS PRN
Qty: 90 ML | Refills: 0 | Status: SHIPPED | OUTPATIENT
Start: 2023-10-19 | End: 2023-10-20

## 2023-10-19 RX ORDER — NEBULIZER ACCESSORIES
KIT MISCELLANEOUS
Qty: 1 KIT | Refills: 0 | Status: SHIPPED | OUTPATIENT
Start: 2023-10-19 | End: 2023-10-20

## 2023-10-19 RX ORDER — PREDNISONE 20 MG/1
40 TABLET ORAL DAILY
Qty: 6 TABLET | Refills: 0 | Status: SHIPPED | OUTPATIENT
Start: 2023-10-19 | End: 2023-10-22

## 2023-10-19 NOTE — TELEPHONE ENCOUNTER
Scripts sent to pharmacy. Advise patient to make appointment if symptoms not improving. Go to ER for worsening  cough with shortness  of breath , chest tightness and difficulty breathing.

## 2023-10-19 NOTE — TELEPHONE ENCOUNTER
Spoke with the patient,verified full name and     Informed her of message and instructions below    Stated does have a UC at 4:30 today    Advised her to keep her appointment    No further questions.

## 2023-10-19 NOTE — PROCEDURES
The office order for PCP removal request is Approved and finalized on October 19, 2023.     Thanks,  [facility] [employer]

## 2023-10-19 NOTE — TELEPHONE ENCOUNTER
Action Requested: Summary for Provider     []  Critical Lab, Recommendations Needed  [] Need Additional Advice  []   FYI    [x]   Need Orders  [] Need Medications Sent to Pharmacy  []  Other     SUMMARY: Per protocol this asthmatic patient will go to  when she gets off work. She states that her nebulizer machine is from when she was a child, mask is lost, and has not solution left. Refills pended. Pharmacy confirmed. Reason for call: Cough    Patient calling, confirmed name and . Symptoms began on 10/14. 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 She complains of a barky cough, chills, body aches, headache, sore throat, and shortness of breath using the stairs. Denies fever, shortness of breath at rest.    Able to speak clearly on the phone with no distress or cough noted. Home care advice provided. PCP update signed per protocol--Dr. Aida Bal. Advised patient to schedule physical.     Patient verbalized understanding and agrees.     Reason for Disposition   Patient wants to be seen    Protocols used: Cough-A-OH

## 2023-10-20 ENCOUNTER — TELEPHONE (OUTPATIENT)
Dept: FAMILY MEDICINE CLINIC | Facility: CLINIC | Age: 23
End: 2023-10-20

## 2023-10-20 LAB
FLUAV + FLUBV RNA SPEC NAA+PROBE: NOT DETECTED
FLUAV + FLUBV RNA SPEC NAA+PROBE: NOT DETECTED
RSV RNA SPEC NAA+PROBE: NOT DETECTED
SARS-COV-2 RNA RESP QL NAA+PROBE: NOT DETECTED

## 2023-10-20 RX ORDER — ALBUTEROL SULFATE 2.5 MG/3ML
2.5 SOLUTION RESPIRATORY (INHALATION) EVERY 4 HOURS PRN
Qty: 180 ML | Refills: 1 | Status: SHIPPED | OUTPATIENT
Start: 2023-10-20

## 2023-10-20 RX ORDER — NEBULIZER ACCESSORIES
KIT MISCELLANEOUS
Qty: 1 KIT | Refills: 0 | Status: SHIPPED | OUTPATIENT
Start: 2023-10-20

## 2023-10-20 NOTE — TELEPHONE ENCOUNTER
Yes, as stated on the script it is for nebulizer, tubing and mouthpiece. New script sent with details repeated in instructions section. Additionally, new script for neb solution sent with update volume of 180mL.

## 2023-10-20 NOTE — TELEPHONE ENCOUNTER
Per pharmacy on file, need to clarify the prescription that they received yesterday if they will issue the Nebulizer machine also and for  Albuterol instead of 90ml can they issue 180ml. Per pharmacy patient is with them right now. Please advise.

## 2023-11-14 DIAGNOSIS — F41.9 ANXIETY: ICD-10-CM

## 2023-11-15 ENCOUNTER — APPOINTMENT (OUTPATIENT)
Dept: CT IMAGING | Age: 23
End: 2023-11-15
Attending: PHYSICIAN ASSISTANT
Payer: COMMERCIAL

## 2023-11-15 ENCOUNTER — HOSPITAL ENCOUNTER (OUTPATIENT)
Age: 23
Discharge: HOME | End: 2023-11-15
Payer: COMMERCIAL

## 2023-11-15 VITALS
DIASTOLIC BLOOD PRESSURE: 99 MMHG | TEMPERATURE: 98 F | SYSTOLIC BLOOD PRESSURE: 163 MMHG | HEART RATE: 96 BPM | RESPIRATION RATE: 18 BRPM | OXYGEN SATURATION: 100 %

## 2023-11-15 DIAGNOSIS — R10.9 LEFT SIDED ABDOMINAL PAIN: Primary | ICD-10-CM

## 2023-11-15 LAB
#MXD IC: 0.9 X10ˆ3/UL (ref 0.1–1)
B-HCG UR QL: NEGATIVE
BASOPHILS # BLD AUTO: 0.08 X10(3) UL (ref 0–0.2)
BASOPHILS NFR BLD AUTO: 0.7 %
BILIRUB UR QL STRIP: NEGATIVE
BUN BLD-MCNC: 19 MG/DL (ref 7–18)
CHLORIDE BLD-SCNC: 102 MMOL/L (ref 98–112)
CLARITY UR: CLEAR
CO2 BLD-SCNC: 25 MMOL/L (ref 21–32)
COLOR UR: YELLOW
CREAT BLD-MCNC: 0.4 MG/DL
DEPRECATED RDW RBC AUTO: 39.2 FL (ref 35.1–46.3)
EGFRCR SERPLBLD CKD-EPI 2021: 141 ML/MIN/1.73M2 (ref 60–?)
EOSINOPHIL # BLD AUTO: 0.21 X10(3) UL (ref 0–0.7)
EOSINOPHIL NFR BLD AUTO: 1.8 %
ERYTHROCYTE [DISTWIDTH] IN BLOOD BY AUTOMATED COUNT: 12.9 % (ref 11–15)
GLUCOSE BLD-MCNC: 251 MG/DL (ref 70–99)
GLUCOSE UR STRIP-MCNC: 100 MG/DL
HCT VFR BLD AUTO: 40.4 %
HCT VFR BLD AUTO: 40.7 %
HCT VFR BLD CALC: 42 %
HGB BLD-MCNC: 13.5 G/DL
HGB BLD-MCNC: 13.6 G/DL
HGB UR QL STRIP: NEGATIVE
IMM GRANULOCYTES # BLD AUTO: 0.03 X10(3) UL (ref 0–1)
IMM GRANULOCYTES NFR BLD: 0.3 %
ISTAT IONIZED CALCIUM FOR CHEM 8: 1.3 MMOL/L (ref 1.12–1.32)
KETONES UR STRIP-MCNC: NEGATIVE MG/DL
LEUKOCYTE ESTERASE UR QL STRIP: NEGATIVE
LYMPHOCYTES # BLD AUTO: 4.28 X10(3) UL (ref 1–4)
LYMPHOCYTES # BLD AUTO: 4.4 X10ˆ3/UL (ref 1–4)
LYMPHOCYTES NFR BLD AUTO: 36.5 %
LYMPHOCYTES NFR BLD AUTO: 37.6 %
MCH RBC QN AUTO: 28.1 PG (ref 26–34)
MCH RBC QN AUTO: 28.2 PG (ref 26–34)
MCHC RBC AUTO-ENTMCNC: 33.4 G/DL (ref 31–37)
MCHC RBC AUTO-ENTMCNC: 33.4 G/DL (ref 31–37)
MCV RBC AUTO: 84.2 FL
MCV RBC AUTO: 84.4 FL (ref 80–100)
MIXED CELL %: 7.6 %
MONOCYTES # BLD AUTO: 0.74 X10(3) UL (ref 0.1–1)
MONOCYTES NFR BLD AUTO: 6.3 %
NEUTROPHILS # BLD AUTO: 6.3 X10ˆ3/UL (ref 1.5–7.7)
NEUTROPHILS # BLD AUTO: 6.4 X10 (3) UL (ref 1.5–7.7)
NEUTROPHILS # BLD AUTO: 6.4 X10(3) UL (ref 1.5–7.7)
NEUTROPHILS NFR BLD AUTO: 54.4 %
NEUTROPHILS NFR BLD AUTO: 54.8 %
NITRITE UR QL STRIP: NEGATIVE
PH UR STRIP: 7 [PH]
PLATELET # BLD AUTO: 374 10(3)UL (ref 150–450)
POTASSIUM BLD-SCNC: 4.2 MMOL/L (ref 3.6–5.1)
PROT UR STRIP-MCNC: NEGATIVE MG/DL
RBC # BLD AUTO: 4.8 X10(6)UL
RBC # BLD AUTO: 4.82 X10ˆ6/UL
SODIUM BLD-SCNC: 137 MMOL/L (ref 136–145)
SP GR UR STRIP: 1.01
UROBILINOGEN UR STRIP-ACNC: <2 MG/DL
WBC # BLD AUTO: 11.6 X10ˆ3/UL (ref 4–11)
WBC # BLD AUTO: 11.7 X10(3) UL (ref 4–11)

## 2023-11-15 PROCEDURE — 80047 BASIC METABLC PNL IONIZED CA: CPT

## 2023-11-15 PROCEDURE — 36415 COLL VENOUS BLD VENIPUNCTURE: CPT

## 2023-11-15 PROCEDURE — 99214 OFFICE O/P EST MOD 30 MIN: CPT

## 2023-11-15 PROCEDURE — 81025 URINE PREGNANCY TEST: CPT

## 2023-11-15 PROCEDURE — 74177 CT ABD & PELVIS W/CONTRAST: CPT | Performed by: PHYSICIAN ASSISTANT

## 2023-11-15 PROCEDURE — 81002 URINALYSIS NONAUTO W/O SCOPE: CPT

## 2023-11-15 PROCEDURE — 85025 COMPLETE CBC W/AUTO DIFF WBC: CPT | Performed by: PHYSICIAN ASSISTANT

## 2023-11-15 RX ORDER — TIRZEPATIDE 7.5 MG/.5ML
7.5 INJECTION, SOLUTION SUBCUTANEOUS WEEKLY
Qty: 2 ML | Refills: 0 | Status: SHIPPED | OUTPATIENT
Start: 2023-11-15 | End: 2023-11-20 | Stop reason: DRUGHIGH

## 2023-11-15 RX ORDER — CLONAZEPAM 0.5 MG/1
0.5 TABLET ORAL NIGHTLY
Qty: 30 TABLET | Refills: 0 | Status: SHIPPED | OUTPATIENT
Start: 2023-11-15 | End: 2023-12-18

## 2023-11-15 NOTE — TELEPHONE ENCOUNTER
Please review. Protocol failed or has no protocol.    Requested Prescriptions   Pending Prescriptions Disp Refills    CLONAZEPAM 0.5 MG Oral Tab [Pharmacy Med Name: Clonazepam 0.5 Mg Tab Teva] 30 tablet 0     Sig: Take 1 tablet (0.5 mg total) by mouth nightly. TAKE AT BEDTIME       There is no refill protocol information for this order       MOUNJARO 7.5 MG/0.5ML Subcutaneous Solution Pen-injector [Pharmacy Med Name: Mounjaro 7.5 Mg/0.5ml Inj Lill] 2 mL 0     Sig: Inject 7.5 mg into the skin once a week.       Diabetes Medication Protocol Failed - 11/14/2023  2:47 PM        Failed - Last A1C < 7.5 and within past 6 months     Lab Results   Component Value Date    A1C 10 (A) 09/02/2023             Passed - In person appointment or virtual visit in the past 6 mos or appointment in next 3 mos     Recent Outpatient Visits              3 weeks ago Viral URI with cough    [facility] Paty Cadena PA-C    Office Visit    2 months ago Vaginal discharge    [facility] Nancy Gallo APRN    Office Visit    5 months ago Attention deficit hyperactivity disorder (ADHD), unspecified ADHD type    [facility] Nancy Gallo APRN    Office Visit    1 year ago [facility]    Office Visit    1 year ago Type 2 diabetes mellitus with hyperglycemia, without long-term current use of insulin (HCC)    [facility]    Office Visit                      Passed - EGFRCR or GFRNAA > 50     GFR Evaluation  EGFRCR: 125 , resulted on 6/5/2023          Passed - GFR in the past 12 months             Recent Outpatient Visits              3 weeks ago Viral URI with cough    [facility]  Paty Cadena PA-C    Office Visit    2 months ago Vaginal discharge    [facility], [address], Nancy Heard APRN    Office Visit    5 months ago Attention deficit hyperactivity disorder (ADHD), unspecified ADHD type    [facility], [address], Nancy Heard APRN    Office Visit    1 year ago Well adult exam    [facility]    Office Visit    1 year ago Type 2 diabetes mellitus with hyperglycemia, without long-term current use of insulin (HCC)    [facility]    Office Visit

## 2023-11-15 NOTE — ED INITIAL ASSESSMENT (HPI)
Patient arrived ambulatory to room c/o LUQ abdominal pain x1 week. Worsening over the last few days. Patient states she feels that her abdomen is swollen in that area. No n/v/d. No fevers. No urinary/vaginal complaints. Easy non labored respirations. No distress.

## 2023-11-20 ENCOUNTER — HOSPITAL ENCOUNTER (OUTPATIENT)
Dept: GENERAL RADIOLOGY | Facility: HOSPITAL | Age: 23
Discharge: HOME | End: 2023-11-20
Payer: COMMERCIAL

## 2023-11-20 ENCOUNTER — LAB ENCOUNTER (OUTPATIENT)
Dept: LAB | Facility: HOSPITAL | Age: 23
End: 2023-11-20
Payer: COMMERCIAL

## 2023-11-20 DIAGNOSIS — R05.1 ACUTE COUGH: ICD-10-CM

## 2023-11-20 DIAGNOSIS — E55.9 VITAMIN D DEFICIENCY: ICD-10-CM

## 2023-11-20 DIAGNOSIS — R10.12 LEFT UPPER QUADRANT ABDOMINAL PAIN: ICD-10-CM

## 2023-11-20 DIAGNOSIS — M54.6 ACUTE LEFT-SIDED THORACIC BACK PAIN: ICD-10-CM

## 2023-11-20 DIAGNOSIS — R79.89 ABNORMAL CBC: ICD-10-CM

## 2023-11-20 LAB
BASOPHILS # BLD AUTO: 0.09 X10(3) UL (ref 0–0.2)
BASOPHILS NFR BLD AUTO: 0.8 %
DEPRECATED RDW RBC AUTO: 38.6 FL (ref 35.1–46.3)
EOSINOPHIL # BLD AUTO: 0.28 X10(3) UL (ref 0–0.7)
EOSINOPHIL NFR BLD AUTO: 2.6 %
ERYTHROCYTE [DISTWIDTH] IN BLOOD BY AUTOMATED COUNT: 12.8 % (ref 11–15)
HCT VFR BLD AUTO: 42.3 %
HGB BLD-MCNC: 14.3 G/DL
IMM GRANULOCYTES # BLD AUTO: 0.03 X10(3) UL (ref 0–1)
IMM GRANULOCYTES NFR BLD: 0.3 %
LIPASE SERPL-CCNC: 51 U/L (ref 12–53)
LYMPHOCYTES # BLD AUTO: 4.39 X10(3) UL (ref 1–4)
LYMPHOCYTES NFR BLD AUTO: 40.1 %
MCH RBC QN AUTO: 28.4 PG (ref 26–34)
MCHC RBC AUTO-ENTMCNC: 33.8 G/DL (ref 31–37)
MCV RBC AUTO: 83.9 FL
MONOCYTES # BLD AUTO: 0.86 X10(3) UL (ref 0.1–1)
MONOCYTES NFR BLD AUTO: 7.9 %
NEUTROPHILS # BLD AUTO: 5.29 X10 (3) UL (ref 1.5–7.7)
NEUTROPHILS # BLD AUTO: 5.29 X10(3) UL (ref 1.5–7.7)
NEUTROPHILS NFR BLD AUTO: 48.3 %
PLATELET # BLD AUTO: 344 10(3)UL (ref 150–450)
RBC # BLD AUTO: 5.04 X10(6)UL
VIT D+METAB SERPL-MCNC: 20.6 NG/ML (ref 30–100)
WBC # BLD AUTO: 10.9 X10(3) UL (ref 4–11)

## 2023-11-20 PROCEDURE — 71046 X-RAY EXAM CHEST 2 VIEWS: CPT

## 2023-11-20 PROCEDURE — 71100 X-RAY EXAM RIBS UNI 2 VIEWS: CPT

## 2023-11-20 PROCEDURE — 83690 ASSAY OF LIPASE: CPT

## 2023-11-20 PROCEDURE — 85025 COMPLETE CBC W/AUTO DIFF WBC: CPT

## 2023-11-20 PROCEDURE — 36415 COLL VENOUS BLD VENIPUNCTURE: CPT

## 2023-11-20 PROCEDURE — 82306 VITAMIN D 25 HYDROXY: CPT

## 2023-11-21 ENCOUNTER — TELEPHONE (OUTPATIENT)
Dept: FAMILY MEDICINE CLINIC | Facility: CLINIC | Age: 23
End: 2023-11-21

## 2023-11-21 NOTE — TELEPHONE ENCOUNTER
Carleen VEE:  I went over xray results and recommendations per result notes. Patient verbalized understanding. Patient questions lab results: vit D is low; she only takes multivitamin. Also she her lipase is 51. She said this is in upper limits and she thought is was high. She is concerned because her left side of the chest is swollen. Did you want her to schedule f/u appt?

## 2023-11-21 NOTE — TELEPHONE ENCOUNTER
Chart reviewed. Labs and Xrays were done 11/20. Informed Pt Treater releases results automatically and provider has not reviewed them. Pt informed she will be contacted once reviewed. Pt verbalized understanding. Fyi -Pt states she prefers a call instead of [place] [facility].

## 2023-11-24 ENCOUNTER — TELEPHONE (OUTPATIENT)
Dept: FAMILY MEDICINE CLINIC | Facility: CLINIC | Age: 23
End: 2023-11-24

## 2023-11-24 DIAGNOSIS — R10.12 LEFT UPPER QUADRANT ABDOMINAL PAIN: Primary | ICD-10-CM

## 2023-11-24 RX ORDER — METHOCARBAMOL 750 MG/1
750 TABLET, FILM COATED ORAL 4 TIMES DAILY PRN
Qty: 60 TABLET | Refills: 0 | Status: SHIPPED | OUTPATIENT
Start: 2023-11-24

## 2023-11-24 NOTE — TELEPHONE ENCOUNTER
Advised patient of the note below. She is willing to schedule a follow up however, needs an appt after 4:00 due to work. She requested to see YADI Lainez as she has seen her before. Scheduled for 12/1/23 at 4:40 with Rajat Patterson.

## 2023-11-24 NOTE — TELEPHONE ENCOUNTER
Can try OTC Lidocaine patches, will send muscle relaxer. Unclear etiology, recommend follow-up for continued eval, ok for res24.

## 2023-11-24 NOTE — TELEPHONE ENCOUNTER
Please call patient with results below. Schedule stat US abdomen.  Go to ER for severe pain with fever, nausea, and vomiting

## 2023-11-24 NOTE — TELEPHONE ENCOUNTER
Patient was notified of recommendations below with understanding. Central scheduling phone number provided. She will keep follow up as scheduled.

## 2023-11-24 NOTE — TELEPHONE ENCOUNTER
Lipase [E]: Patient Communication     Edit Comments   Edit Notifications     Vitamin D remains low. Improved compared to 5 months ago, take vitamin D 50,000 international units weekly x 12 weeks. Recheck level in 3 months, order placed. CBC normal, no signs of anemia or infection. Lymphocytes slightly elevated, may be due to recent viral infection/cough. Recommend repeat CBC in 2 weeks, order placed. Lipase normal, upper limit of normal. Patient had CT abdomen/pelvis completed in ER and pancreas was normal. Ultrasound abdomen ordered placed stat, patient can call [phone] to schedule imaging if she continues to have left upper quadrant pain.    Written by Frankey Kindler, APRN on 11/24/2023  4:30 PM CST

## 2023-11-24 NOTE — TELEPHONE ENCOUNTER
Patient called (identified name and ),   Has been to Immediate Care and saw Alin SANCHEZ on  for pain in left side that wraps around to left side. She has tried Tylenol, Tramadol, Advil, Tylenol with codeine (from dental procedure), essential oil linament, hot showers and nothing helps. Pain interferes with daily activities. The only thing that helps is pressing on left side with her hand, or tying robe tightly. She also noted some oiliness of stool. She was worried about pancreatitis but recent lipase normal at 51. Denies hematuria. Denies shingles or lesions,  Negative abdominal CT on 11/15/2023. She is asking what can she take for the pain? Please advise? Routed to [facility], not in office today, and to Dr Kiara Hull.

## 2023-11-25 ENCOUNTER — HOSPITAL ENCOUNTER (OUTPATIENT)
Dept: ULTRASOUND IMAGING | Facility: HOSPITAL | Age: 23
Discharge: HOME | End: 2023-11-25
Payer: COMMERCIAL

## 2023-11-25 DIAGNOSIS — R10.12 LEFT UPPER QUADRANT ABDOMINAL PAIN: ICD-10-CM

## 2023-11-25 DIAGNOSIS — R74.8 INCREASED SERUM LIPASE LEVEL: ICD-10-CM

## 2023-11-25 PROCEDURE — 76700 US EXAM ABDOM COMPLETE: CPT

## 2023-11-25 NOTE — TELEPHONE ENCOUNTER
Reviewed US abdomen and pancreas is normal. Hepatomegaly with hepatic steatosis (fatty liver noted). This may be due to weight. Weight loss should help with hepatomegaly and fatty liver. Will discuss with patient in more detail during office visit on 12/1/23. If patent increasingly concerned, can place referral for gastroenterology.

## 2023-12-01 ENCOUNTER — OFFICE VISIT (OUTPATIENT)
Dept: FAMILY MEDICINE CLINIC | Facility: CLINIC | Age: 23
End: 2023-12-01

## 2023-12-01 VITALS
WEIGHT: 293 LBS | HEART RATE: 99 BPM | SYSTOLIC BLOOD PRESSURE: 131 MMHG | TEMPERATURE: 98 F | RESPIRATION RATE: 19 BRPM | HEIGHT: 68 IN | DIASTOLIC BLOOD PRESSURE: 90 MMHG | BODY MASS INDEX: 44.41 KG/M2 | OXYGEN SATURATION: 98 %

## 2023-12-01 DIAGNOSIS — J02.9 SORE THROAT: ICD-10-CM

## 2023-12-01 DIAGNOSIS — R10.12 LEFT UPPER QUADRANT ABDOMINAL PAIN: ICD-10-CM

## 2023-12-01 DIAGNOSIS — Z20.822 SUSPECTED COVID-19 VIRUS INFECTION: Primary | ICD-10-CM

## 2023-12-01 LAB
CONTROL LINE PRESENT WITH A CLEAR BACKGROUND (YES/NO): YES YES/NO
COVID19 BINAX NOW ANTIGEN: NOT DETECTED
KIT LOT #: NORMAL NUMERIC
OPERATOR ID: NORMAL
STREP GRP A CUL-SCR: NEGATIVE

## 2023-12-01 PROCEDURE — 3075F SYST BP GE 130 - 139MM HG: CPT

## 2023-12-01 PROCEDURE — 3008F BODY MASS INDEX DOCD: CPT

## 2023-12-01 PROCEDURE — 3080F DIAST BP >= 90 MM HG: CPT

## 2023-12-01 PROCEDURE — 99213 OFFICE O/P EST LOW 20 MIN: CPT

## 2023-12-01 PROCEDURE — 87880 STREP A ASSAY W/OPTIC: CPT

## 2023-12-04 ENCOUNTER — TELEPHONE (OUTPATIENT)
Dept: FAMILY MEDICINE CLINIC | Facility: CLINIC | Age: 23
End: 2023-12-04

## 2023-12-04 NOTE — TELEPHONE ENCOUNTER
Sent reminder for Patient to schedule DM eye exam or to fax results over if exam was completed at another facility.

## 2023-12-12 ENCOUNTER — TELEPHONE (OUTPATIENT)
Dept: FAMILY MEDICINE CLINIC | Facility: CLINIC | Age: 23
End: 2023-12-12

## 2023-12-12 ENCOUNTER — LAB ENCOUNTER (OUTPATIENT)
Dept: LAB | Facility: HOSPITAL | Age: 23
End: 2023-12-12
Payer: COMMERCIAL

## 2023-12-12 DIAGNOSIS — R10.12 LEFT UPPER QUADRANT ABDOMINAL PAIN: ICD-10-CM

## 2023-12-12 DIAGNOSIS — E55.9 VITAMIN D DEFICIENCY: ICD-10-CM

## 2023-12-12 DIAGNOSIS — R79.89 ABNORMAL CBC: ICD-10-CM

## 2023-12-12 LAB
BASOPHILS # BLD AUTO: 0.07 X10(3) UL (ref 0–0.2)
BASOPHILS NFR BLD AUTO: 0.6 %
DEPRECATED RDW RBC AUTO: 38.4 FL (ref 35.1–46.3)
EOSINOPHIL # BLD AUTO: 0.24 X10(3) UL (ref 0–0.7)
EOSINOPHIL NFR BLD AUTO: 2 %
ERYTHROCYTE [DISTWIDTH] IN BLOOD BY AUTOMATED COUNT: 12.7 % (ref 11–15)
HCT VFR BLD AUTO: 40.8 %
HGB BLD-MCNC: 13.9 G/DL
IMM GRANULOCYTES # BLD AUTO: 0.03 X10(3) UL (ref 0–1)
IMM GRANULOCYTES NFR BLD: 0.3 %
LYMPHOCYTES # BLD AUTO: 3.67 X10(3) UL (ref 1–4)
LYMPHOCYTES NFR BLD AUTO: 30.9 %
MCH RBC QN AUTO: 28.5 PG (ref 26–34)
MCHC RBC AUTO-ENTMCNC: 34.1 G/DL (ref 31–37)
MCV RBC AUTO: 83.8 FL
MONOCYTES # BLD AUTO: 0.95 X10(3) UL (ref 0.1–1)
MONOCYTES NFR BLD AUTO: 8 %
NEUTROPHILS # BLD AUTO: 6.9 X10 (3) UL (ref 1.5–7.7)
NEUTROPHILS # BLD AUTO: 6.9 X10(3) UL (ref 1.5–7.7)
NEUTROPHILS NFR BLD AUTO: 58.2 %
PLATELET # BLD AUTO: 315 10(3)UL (ref 150–450)
RBC # BLD AUTO: 4.87 X10(6)UL
VIT D+METAB SERPL-MCNC: 27.4 NG/ML (ref 30–100)
WBC # BLD AUTO: 11.9 X10(3) UL (ref 4–11)

## 2023-12-12 PROCEDURE — 82306 VITAMIN D 25 HYDROXY: CPT

## 2023-12-12 PROCEDURE — 36415 COLL VENOUS BLD VENIPUNCTURE: CPT

## 2023-12-12 PROCEDURE — 85025 COMPLETE CBC W/AUTO DIFF WBC: CPT

## 2023-12-20 PROBLEM — R45.4 IRRITABILITY AND ANGER: Status: ACTIVE | Noted: 2023-12-20

## 2023-12-20 PROBLEM — Z63.4 RECENT BEREAVEMENT: Status: ACTIVE | Noted: 2023-12-20

## 2023-12-21 ENCOUNTER — PATIENT MESSAGE (OUTPATIENT)
Dept: FAMILY MEDICINE CLINIC | Facility: CLINIC | Age: 23
End: 2023-12-21

## 2023-12-21 DIAGNOSIS — R06.83 SNORING: Primary | ICD-10-CM

## 2023-12-24 RX ORDER — TIRZEPATIDE 10 MG/.5ML
1 INJECTION, SOLUTION SUBCUTANEOUS WEEKLY
Qty: 2 ML | Refills: 0 | OUTPATIENT
Start: 2023-12-24

## 2023-12-24 NOTE — TELEPHONE ENCOUNTER
Failed Protocol/No Protocol.    Requested Prescriptions   Pending Prescriptions Disp Refills    MOUNJARO 10 MG/0.5ML Subcutaneous Solution Pen-injector [Pharmacy Med Name: Mounjaro 10 Mg/0.5ml Inj Lill] 2 mL 0     Sig: Inject 1 Pen into the skin once a week.       Diabetes Medication Protocol Failed - 12/22/2023 11:30 AM        Failed - Last A1C < 7.5 and within past 6 months     Lab Results   Component Value Date    A1C 10 (A) 09/02/2023             Passed - In person appointment or virtual visit in the past 6 mos or appointment in next 3 mos     Recent Outpatient Visits              3 weeks ago Suspected COVID-19 virus infection    [facility], [address], [place]    Office Visit    1 month ago Anxiety    [facility], [facility] Nancy Gallo APRN    Office Visit    2 months ago Viral URI with cough    [facility], [facility], [facility] Paty Cadena PA-C    Office Visit    3 months [facility]    [facility]    6 months ago Attention deficit hyperactivity disorder (ADHD), unspecified ADHD type    [facility]          Future Appointments         Provider Department Appt Notes    In 2 weeks [facility] Removing current birth control implant from arm and replacing with a new one. (Expires in Feb 2024)               Passed - EGFRCR or GFRNAA > 50     GFR Evaluation  EGFRCR: 141 , resulted on 11/15/2023          Passed - GFR in the past 12 months             Future Appointments         Provider Department Appt Notes    In 2 weeks [facility] Removing current birth control implant from  arm and replacing with a new one. (Expires in Feb 2024)          Recent Outpatient Visits              3 weeks ago [facility] [address], Nancy Heard APRN    Office Visit    1 month ago Anxiety    [facility] [address], Nancy Heard APRN    Office Visit    2 months ago Viral URI with cough    [facility]    Office Visit    3 months ago [facility]inal [facility] [address], Nancy Heard APRN    Office Visit    6 months ago Attention deficit hyperactivity disorder (ADHD), unspecified ADHD type    [facility], Nancy Heard APRN    Office Visit

## 2023-12-26 NOTE — TELEPHONE ENCOUNTER
Pharmacy called to request a follow up call from the doctor. Patient is out of  Tirzepatide (MOUNJARO) 10 MG/0.5ML Subcutaneous Solution Pen-injector and needs a refill as soon as possible.

## 2023-12-27 RX ORDER — TIRZEPATIDE 10 MG/.5ML
1 INJECTION, SOLUTION SUBCUTANEOUS WEEKLY
Qty: 2 ML | Refills: 0 | Status: SHIPPED | OUTPATIENT
Start: 2023-12-27 | End: 2024-01-23

## 2023-12-28 RX ORDER — TIRZEPATIDE 10 MG/.5ML
1 INJECTION, SOLUTION SUBCUTANEOUS WEEKLY
Qty: 2 ML | Refills: 0 | OUTPATIENT
Start: 2023-12-28

## 2024-01-21 ENCOUNTER — PATIENT MESSAGE (OUTPATIENT)
Dept: FAMILY MEDICINE CLINIC | Facility: CLINIC | Age: 24
End: 2024-01-21

## 2024-01-21 LAB — AMB EXT COVID-19 RESULT: DETECTED

## 2024-01-22 ENCOUNTER — TELEMEDICINE (OUTPATIENT)
Dept: FAMILY MEDICINE CLINIC | Facility: CLINIC | Age: 24
End: 2024-01-22
Payer: COMMERCIAL

## 2024-01-22 ENCOUNTER — NURSE TRIAGE (OUTPATIENT)
Dept: FAMILY MEDICINE CLINIC | Facility: CLINIC | Age: 24
End: 2024-01-22

## 2024-01-22 DIAGNOSIS — U07.1 COVID: Primary | ICD-10-CM

## 2024-01-22 NOTE — TELEPHONE ENCOUNTER
From: Joaquina Ferrari  To: Nancy Gallo  Sent: 1/21/2024 12:16 PM CST  Subject: COVID    Hi, this morning I woke up feeling very sick. I took a Covid test and it is positive. Is there anyway you can prescribe me Paxlovid? I took it back in May of 2022 last time I had Covid and it helped me a lot.

## 2024-01-22 NOTE — TELEPHONE ENCOUNTER
Action Requested: Summary for Provider     []  Critical Lab, Recommendations Needed  [] Need Additional Advice  []   FYI    []   Need Orders  [] Need Medications Sent to Pharmacy  []  Other     SUMMARY: Patient tested positive at home for covid yesterday. Does not want to leave the house. Has Asthma and DM. Using inhalers and is having some wheezing. Asking for video visit today with provider for eval and RX for Paxlovid. Patient scheduled but ER warning signs given such as SOB, CP or trouble breathing.     Future Appointments   Date Time Provider Department Center   1/22/2024  2:10 PM [facility]   1/30/2024  2:20 PM Hayley Burgos APRN LOM[facility] [id number]   2/7/2024  6:00 PM Elaine Thapa CNM [facility]         Reason for call: Covid  Onset: yesterday       Reason for Disposition   MILD difficulty breathing (e.g., minimal/no SOB at rest, SOB with walking, pulse <100)    Protocols used: Coronavirus (COVID-19) Diagnosed or [id number]

## 2024-01-22 NOTE — PROGRESS NOTES
Subjective:   Patient ID: Joaquina Ferrari is a 23 year old female.    This visit is conducted using Telemedicine with live, interactive video and audio during this Coronavirus pandemic.    Please note that the following visit was completed using two-way, real-time interactive audio and/or video communication.  This has been done in good andreas to provide continuity of care in the best interest of the provider-patient relationship, due to the ongoing public health crisis/national emergency and because of restrictions of visitation.  There are limitations of this visit as no physical exam could be performed.  Every conscious effort was taken to allow for sufficient and adequate time.  This billing was spent on reviewing labs, medications, radiology tests and decision making.  Appropriate medical decision-making and tests are ordered as detailed in the plan of care above    Virtual Telephone Check-In    Joaquina Ferrari verbally consents to a Virtual/Telephone Check-In visit on 01/22/24.  Patient has been referred to the [facility] website at [email or web address] to review the yearly Consent to Treat document.    Patient understands and accepts financial responsibility for any deductible, co-insurance and/or co-pays associated with this service.    Duration of the service: 5 minutes      Summary of topics discussed: COVID    Pt has positive COVID test. Had some cold symptoms for 1-2 days. No sig SOB or wheezing.   Pt has hx of DM, asthma and obesity. Has been treated with paxlovid in past. No kidney issues. Pt is on a number of medications and reviewed interaction and to hold statin, clonazepam and buspirone/ vyvanse while on paxlovid.     Jerry Erazo MD                  History/Other:   Review of Systems  Current Outpatient Medications   Medication Sig Dispense Refill    nirmatrelvir-ritonavir 300-100 MG Oral Tablet Therapy Pack Take two nirmatrelvir tablets (300mg) with one ritonavir tablet (100mg) together twice  daily for 5 days. 30 tablet 0    busPIRone 15 MG Oral Tab Take 1/2 tab nightly for 3-4 days, then increase to 1 tab nightly if tolerated. 90 tablet 0    lisdexamfetamine (VYVANSE) 40 MG Oral Cap Take 1 capsule (40 mg total) by mouth every morning. 30 capsule 0    [START ON 2/16/2024] lisdexamfetamine (VYVANSE) 40 MG Oral Cap Take 1 capsule (40 mg total) by mouth daily. 30 capsule 0    [START ON 3/16/2024] lisdexamfetamine (VYVANSE) 40 MG Oral Cap Take 1 capsule (40 mg total) by mouth daily. 30 capsule 0    [START ON 1/31/2024] clonazePAM (KLONOPIN) 0.5 MG Oral Tab Take 1 tablet (0.5 mg total) by mouth at bedtime. 30 tablet 2    Tirzepatide (MOUNJARO) 10 MG/0.5ML Subcutaneous Solution Pen-injector Inject 1 Pen into the skin once a week. 2 mL 0    clonazePAM 0.5 MG Oral Tab Take 1 tablet (0.5 mg total) by mouth at bedtime. 30 tablet 1    methocarbamol 750 MG Oral Tab Take 1 tablet (750 mg total) by mouth 4 (four) times daily as needed (pain). 60 tablet 0    fluconazole 150 MG Oral Tab Take one tablet now, repeat in 3 days if symptoms not improved 2 tablet 0    traMADol 50 MG Oral Tab Take 1 tablet (50 mg total) by mouth every 6 (six) hours as needed for Pain. 15 tablet 0    Respiratory Therapy Supplies (NEBULIZER/TUBING/MOUTHPIECE) Does not apply Kit Nebulizer machine and all necessary equipment DX: Asthma J45.909 1 kit 0    albuterol (2.5 MG/3ML) 0.083% Inhalation Nebu Soln Take 3 mL (2.5 mg total) by nebulization every 4 (four) hours as needed for Wheezing. 180 mL 1    Continuous Blood Gluc Sensor (FREESTYLE HESHAM 2 SENSOR) Does not apply Misc 1 each every 14 (fourteen) days. 2 each 2    atorvastatin 20 MG Oral Tab Take 1 tablet (20 mg total) by mouth nightly. 90 tablet 1    albuterol (PROAIR HFA) 108 (90 Base) MCG/ACT Inhalation Aero Soln Inhale 2 puffs into the lungs every 4 (four) hours as needed for Wheezing. 2 each 1    metFORMIN  MG Oral Tablet 24 Hr Take 1 tablet (500 mg total) by mouth daily with  breakfast. 90 tablet 1    Continuous Blood Gluc  (FREESTYLE HESHAM 2 READER) Does not apply Device 1 each every 14 (fourteen) days. 2 each 2    Acetaminophen-Codeine #3 300-30 MG Oral Tab Take 1 tablet by mouth every 4 to 6 hours as needed.      Glucose Blood (ONETOUCH ULTRA) In Vitro Strip Check bld sugar once a day 100 strip 11    OneTouch Delica Lancets 30G Does not apply Misc 1 each daily. Check bld sugar once daily 100 each 11    Blood Glucose Monitoring Suppl (ONETOUCH ULTRA 2) w/Device Does not apply Kit Use as directed 1 kit 0    ibuprofen 100 MG Oral Tab Take 1 tablet (100 mg total) by mouth every 6 (six) hours as needed for Fever.      Insulin Pen Needle (PEN NEEDLES) 32G X 4 MM Does not apply Misc 1 each every 7 days. 30 each 0    OMEPRAZOLE OR Take by mouth.       Allergies:No Known Allergies    Objective:   Physical Exam  Constitutional:       Appearance: Normal appearance.   Pulmonary:      Effort: Pulmonary effort is normal.      Comments: Pt is breathing comfortable over the video/ phone and speaking in full sentences without shortness of breath      Neurological:      General: No focal deficit present.      Mental Status: She is alert and oriented to person, place, and time.         Assessment & Plan:   1. COVID: mild symptoms:   - After discussion with patient, paxlovid as directed; reviewed interaction and to hold statin, clonazepam and buspirone/ vyvanse while on paxlovid.   Over the counter remedies discussed; To call if worse or not better; ER if any sig symptoms. Discussed isolation and wearing mask after 5 days. Patient verbalized understanding of recommendations and agrees to plan.    -  VIDEO VISIT done.           No orders of the defined types were placed in this encounter.      Meds This Visit:  Requested Prescriptions     Signed Prescriptions Disp Refills    nirmatrelvir-ritonavir 300-100 MG Oral Tablet Therapy Pack 30 tablet 0     Sig: Take two nirmatrelvir tablets (300mg) with  one ritonavir tablet (100mg) together twice daily for 5 days.       Imaging & Referrals:  None

## 2024-01-22 NOTE — TELEPHONE ENCOUNTER
Future Appointments   Date Time Provider Department Center   1/22/2024  2:10 PM [facility]   1/30/2024  2:20 PM Hayley Burgos APRN LOM[facility] [id number]   2/7/2024  6:00 PM Elaine Thapa CNM [facility]

## 2024-01-29 ENCOUNTER — TELEPHONE (OUTPATIENT)
Dept: FAMILY MEDICINE CLINIC | Facility: CLINIC | Age: 24
End: 2024-01-29

## 2024-01-29 NOTE — TELEPHONE ENCOUNTER
Tirzepatide (MOUNJARO) 10 MG/0.5ML Subcutaneous Solution Pen-injector, Inject 1 Pen into the skin once a week. Needs to complete blood work for further refills, Disp: 2 mL, Rfl: 0    Key: [id number]

## 2024-02-07 ENCOUNTER — OFFICE VISIT (OUTPATIENT)
Dept: OBGYN CLINIC | Facility: CLINIC | Age: 24
End: 2024-02-07
Payer: COMMERCIAL

## 2024-02-07 VITALS
HEIGHT: 68 IN | SYSTOLIC BLOOD PRESSURE: 124 MMHG | DIASTOLIC BLOOD PRESSURE: 85 MMHG | BODY MASS INDEX: 44.41 KG/M2 | WEIGHT: 293 LBS

## 2024-02-07 DIAGNOSIS — Z01.812 PRE-PROCEDURAL LABORATORY EXAMINATION: ICD-10-CM

## 2024-02-07 DIAGNOSIS — Z30.46 ENCOUNTER FOR REMOVAL AND REINSERTION OF ETONOGESTREL IMPLANT: Primary | ICD-10-CM

## 2024-02-07 LAB
CONTROL LINE PRESENT WITH A CLEAR BACKGROUND (YES/NO): YES YES/NO
KIT LOT #: NORMAL NUMERIC
PREGNANCY TEST, URINE: NEGATIVE

## 2024-02-07 RX ORDER — ERGOCALCIFEROL 1.25 MG/1
50000 CAPSULE ORAL WEEKLY
COMMUNITY
Start: 2024-01-15

## 2024-02-08 NOTE — PROCEDURES
Nexplanon Insertion & Removal    Pregnancy Results: negative from urine test   Birth control method(s) used:  ; Nexplanon:    Consent was obtained from the patient.    Removal:  1 % lidocaine with Epinephrine was injected underneath the tip of the Nexplanon indio that is closest to the left elbow.  Nexplanon was located by palpation.  8 mm incision was made at the tip of the indio closest to the elbow.  Nexplanon was pushed toward the incision.  Nexplanon indio was sharply dissected from the tissue sheath.  The entire Nexplanon indio was removed.  Appears complete and intact and visualized by the patient    Insertion:    The patient was positioned with her left arm flexed.    Insertion was done through the removal incision  Device opened, indio confirmed within device.  Lateral traction of skin performed while Nexplanon inserted.  The Nexplanon was placed 8 cm from the epicondyle without difficulty.    The ridged portion of the applicator was seen once the device was withdrawn.      Visit Plan:  Steri-Strips were applied to the skin incision.  An Ace bandage was wrapped around the injected arm.  Both Physician and pt confirmed device by tactile feel.  Patient was instructed to remove Ace bandage in 24 hours.  Patient was instructed to remove Steri-Strips in 7 days.  All of the patient's questions were addressed.  Nexplanon info card was given to the patient with expiration  Pt will return to office next week for annual exam

## 2024-02-15 ENCOUNTER — OFFICE VISIT (OUTPATIENT)
Dept: FAMILY MEDICINE CLINIC | Facility: CLINIC | Age: 24
End: 2024-02-15

## 2024-02-15 VITALS
BODY MASS INDEX: 44.41 KG/M2 | OXYGEN SATURATION: 97 % | HEIGHT: 68 IN | TEMPERATURE: 98 F | DIASTOLIC BLOOD PRESSURE: 87 MMHG | WEIGHT: 293 LBS | HEART RATE: 103 BPM | SYSTOLIC BLOOD PRESSURE: 138 MMHG

## 2024-02-15 DIAGNOSIS — Z97.5 NEXPLANON IN PLACE: ICD-10-CM

## 2024-02-15 DIAGNOSIS — L08.1 ERYTHRASMA: ICD-10-CM

## 2024-02-15 DIAGNOSIS — F19.981 DRUG-INDUCED SEXUAL DYSFUNCTION (HCC): ICD-10-CM

## 2024-02-15 DIAGNOSIS — Z00.00 WELL ADULT EXAM: Primary | ICD-10-CM

## 2024-02-15 DIAGNOSIS — F41.9 ANXIETY DISORDER, UNSPECIFIED TYPE: ICD-10-CM

## 2024-02-15 DIAGNOSIS — E11.65 TYPE 2 DIABETES MELLITUS WITH HYPERGLYCEMIA, WITHOUT LONG-TERM CURRENT USE OF INSULIN (HCC): ICD-10-CM

## 2024-02-15 DIAGNOSIS — Z12.4 ENCOUNTER FOR PAPANICOLAOU SMEAR FOR CERVICAL CANCER SCREENING: ICD-10-CM

## 2024-02-15 RX ORDER — CLOTRIMAZOLE 1 %
1 CREAM (GRAM) TOPICAL 2 TIMES DAILY
Qty: 195 G | Refills: 0 | Status: SHIPPED | OUTPATIENT
Start: 2024-02-15

## 2024-02-15 RX ORDER — BUSPIRONE HYDROCHLORIDE 15 MG/1
15 TABLET ORAL EVERY EVENING
Qty: 90 TABLET | Refills: 3 | Status: SHIPPED | OUTPATIENT
Start: 2024-02-15

## 2024-02-15 RX ORDER — CLONAZEPAM 0.5 MG/1
0.5 TABLET ORAL 3 TIMES DAILY PRN
Qty: 90 TABLET | Refills: 1 | Status: SHIPPED | OUTPATIENT
Start: 2024-02-15

## 2024-02-15 RX ORDER — ERGOCALCIFEROL 1.25 MG/1
50000 CAPSULE ORAL WEEKLY
Qty: 12 CAPSULE | Refills: 0 | Status: SHIPPED | OUTPATIENT
Start: 2024-02-15

## 2024-02-15 RX ORDER — CLONAZEPAM 0.25 MG/1
0.25 TABLET, ORALLY DISINTEGRATING ORAL DAILY PRN
Qty: 30 TABLET | Refills: 1 | Status: SHIPPED | OUTPATIENT
Start: 2024-02-15

## 2024-02-15 NOTE — PROGRESS NOTES
HPI:    Patient ID: Joaquina Ferrari is a 24 year old female.    HPI  Pt presenting for routine physical exam. Denies any acute issues or recent illnesses. No significant chronic medical problems. Past medical/surgical history, family history, and social history were reviewed.     Insurance will run out end of month, requesting refill supply    Notes rash along axilla  Associated itching and sensitivity  No new soaps, detergents, medications, exposures    Mom Dx Tcell lymphoma April 2023  Bed sore on buttock, couldn't proceed with cancer treatment  Passed Sept 2023    Mood stable, denies SH/SI/HI.    Recently had Nexplanon exchanged  Reports localized tenderness and swelling  Notes pulling sensation with arm movement    Review of Systems   A comprehensive 10 point review of systems was completed.  Pertinent positives and negatives noted in the the HPI.       Current Outpatient Medications   Medication Sig Dispense Refill    busPIRone 15 MG Oral Tab Take 1 tablet (15 mg total) by mouth every evening. 90 tablet 3    clonazePAM 0.5 MG Oral Tab Take 1 tablet (0.5 mg total) by mouth 3 (three) times daily as needed for Anxiety. 90 tablet 1    clonazePAM 0.25 MG Oral Tablet Dispersible Take 1 tablet (0.25 mg total) by mouth daily as needed. 30 tablet 1    clotrimazole 1 % External Cream Apply 1 Application topically 2 (two) times daily. 195 g 0    lisdexamfetamine (VYVANSE) 40 MG Oral Cap Take 1 capsule (40 mg total) by mouth every morning. 30 capsule 0    [START ON 3/16/2024] lisdexamfetamine (VYVANSE) 40 MG Oral Cap Take 1 capsule (40 mg total) by mouth daily. 30 capsule 0    clonazePAM (KLONOPIN) 0.5 MG Oral Tab Take 1 tablet (0.5 mg total) by mouth at bedtime. 30 tablet 2    albuterol (2.5 MG/3ML) 0.083% Inhalation Nebu Soln Take 3 mL (2.5 mg total) by nebulization every 4 (four) hours as needed for Wheezing. 180 mL 1    atorvastatin 20 MG Oral Tab Take 1 tablet (20 mg total) by mouth nightly. 90 tablet 1    albuterol  (PROAIR HFA) 108 (90 Base) MCG/ACT Inhalation Aero Soln Inhale 2 puffs into the lungs every 4 (four) hours as needed for Wheezing. 2 each 1    metFORMIN  MG Oral Tablet 24 Hr Take 1 tablet (500 mg total) by mouth daily with breakfast. 90 tablet 1    Terconazole 0.8 % Vaginal Cream Place 1 applicator vaginally nightly for 5 days. 60 g 0    ergocalciferol 1.25 MG (42286 UT) Oral Cap Take 1 capsule (50,000 Units total) by mouth once a week. 12 capsule 0     Allergies:No Known Allergies   Vitals:    02/15/24 1707   BP: 138/87   Pulse: 103   Temp: 98 °F (36.7 °C)   TempSrc: Temporal   SpO2: 97%   Weight: (!) 331 lb 9.6 oz (150.4 kg)   Height: 5' 8\" (1.727 m)       Body mass index is 50.42 kg/m².   PHYSICAL EXAM:   Physical Exam  Vitals reviewed.   Constitutional:       General: She is not in acute distress.     Appearance: Normal appearance. She is well-developed.   HENT:      Head: Normocephalic and atraumatic.      Right Ear: Tympanic membrane, ear canal and external ear normal.      Left Ear: Tympanic membrane, ear canal and external ear normal.   Eyes:      Conjunctiva/sclera: Conjunctivae normal.   Neck:      Thyroid: No thyroid mass or thyroid tenderness.   Cardiovascular:      Rate and Rhythm: Normal rate and regular rhythm.      Pulses: Normal pulses.      Heart sounds: Normal heart sounds, S1 normal and S2 normal. No murmur heard.  Pulmonary:      Effort: Pulmonary effort is normal. No respiratory distress.      Breath sounds: Normal breath sounds. No wheezing, rhonchi or rales.   Abdominal:      General: Bowel sounds are normal.      Palpations: Abdomen is soft.      Tenderness: There is no abdominal tenderness. There is no guarding or rebound.   Genitourinary:     General: Normal vulva.      Exam position: Lithotomy position.      Vagina: Normal.      Cervix: Normal.      Uterus: Not tender.       Adnexa:         Right: No tenderness.          Left: No tenderness.     Musculoskeletal:      Left upper  arm: Swelling and tenderness present.      Cervical back: Normal range of motion and neck supple. No muscular tenderness.      Right lower leg: No edema.      Left lower leg: No edema.      Comments: No fluctuance or erythema noted   Feet:      Comments: Bilateral barefoot skin diabetic exam is normal, visualized feet and the appearance is normal.  Bilateral monofilament/sensation of both feet is normal.  Pulsation pedal pulse exam of both lower legs/feet is normal as well.  Lymphadenopathy:      Cervical: No cervical adenopathy.   Skin:     General: Skin is warm and dry.      Coloration: Skin is not jaundiced.   Neurological:      General: No focal deficit present.      Mental Status: She is alert and oriented to person, place, and time. Mental status is at baseline.   Psychiatric:         Attention and Perception: Attention normal.         Mood and Affect: Mood normal.         Behavior: Behavior normal. Behavior is cooperative.         Cognition and Memory: Cognition normal.             ASSESSMENT/PLAN:   1. Well adult exam  Discussed preventative screenings  - will check Pap/HPV testing today  - will check labs as below  - encouraged to continue diet/exercise for overall wellness  - follow-up with eye doctor annually  - follow-up with dentist every 6 months  - return yearly for physicals  - annual flu shot  - tetanus booster every 10yrs  - TSH W Reflex To Free T4; Future  - CBC, Platelet; No Differential; Future  - Comp Metabolic Panel (14); Future  - Lipid Panel; Future  - Vitamin D; Future    2. Anxiety disorder, unspecified type  Stable, continue dosing  - busPIRone 15 MG Oral Tab; Take 1 tablet (15 mg total) by mouth every evening.  Dispense: 90 tablet; Refill: 3    3. Drug-induced sexual dysfunction (HCC)  As above  - busPIRone 15 MG Oral Tab; Take 1 tablet (15 mg total) by mouth every evening.  Dispense: 90 tablet; Refill: 3    4. Encounter for Papanicolaou smear for cervical cancer screening  - ThinPrep  PAP with HPV Reflex Request [E]; Future  - ThinPrep PAP with HPV Reflex Request [E]  - ThinPrep PAP with HPV Reflex Request    5. Type 2 diabetes mellitus with hyperglycemia, without long-term current use of insulin (HCC)  Last A1c 10.0 Sept 2023, started on Mounjaro at that time  - counseled on healthy diet and lifestyle changes for overall wellness, which includes decreased carb and sugar intake, increased fiber intake, and increased water intake as tolerated  - discussed exercise as able  - Microalb/Creat Ratio, Random Urine; Future  - Hemoglobin A1C; Future    6. Erythrasma  - continue gentle hygiene  - will trial clotrimazole topical  - discussed red flags for urgent reevaluation  - clotrimazole 1 % External Cream; Apply 1 Application topically 2 (two) times daily.  Dispense: 195 g; Refill: 0    7. Nexplanon in place  Pt reports increased sensitivity and swelling following exchanged  Discussed role of US for persistent symptoms  May consider exchange per preference  - discussed red flags for urgent reevaluation  - US EXTREMITY NONVASCULAR  (CPT=76882); Future    Pt verbalized understanding and agrees with plan.    Orders Placed This Encounter   Procedures    Microalb/Creat Ratio, Random Urine    TSH W Reflex To Free T4    CBC, Platelet; No Differential    Comp Metabolic Panel (14)    Hemoglobin A1C    Lipid Panel    Vitamin D    ThinPrep PAP with HPV Reflex Request [E]    ThinPrep PAP with HPV Reflex Request       Meds This Visit:  Requested Prescriptions     Signed Prescriptions Disp Refills    busPIRone 15 MG Oral Tab 90 tablet 3     Sig: Take 1 tablet (15 mg total) by mouth every evening.    clonazePAM 0.5 MG Oral Tab 90 tablet 1     Sig: Take 1 tablet (0.5 mg total) by mouth 3 (three) times daily as needed for Anxiety.    clonazePAM 0.25 MG Oral Tablet Dispersible 30 tablet 1     Sig: Take 1 tablet (0.25 mg total) by mouth daily as needed.    clotrimazole 1 % External Cream 195 g 0     Sig: Apply 1  Application topically 2 (two) times daily.       Imaging & Referrals:  US EXTREMITY NONVASCULAR  (CPT=76882)         ID#[id number]

## 2024-02-15 NOTE — TELEPHONE ENCOUNTER
Dr. Coffman - please advise on Vitamin D Refill today, visit today 5pm     Externally reported.     Future Appointments   Date Time Provider Department Center   2/15/2024  5:00 PM [facility]   2/21/2024  4:30 PM Elaine Thapa CNM [facility]   2/27/2024  5:40 PM Hayley Burgos APRN LOMGWD [id number]

## 2024-02-19 ENCOUNTER — NURSE ONLY (OUTPATIENT)
Dept: INTERNAL MEDICINE CLINIC | Facility: CLINIC | Age: 24
End: 2024-02-19
Payer: COMMERCIAL

## 2024-02-19 ENCOUNTER — MOBILE ENCOUNTER (OUTPATIENT)
Dept: FAMILY MEDICINE CLINIC | Facility: CLINIC | Age: 24
End: 2024-02-19

## 2024-02-19 DIAGNOSIS — E11.65 TYPE 2 DIABETES MELLITUS WITH HYPERGLYCEMIA, WITHOUT LONG-TERM CURRENT USE OF INSULIN (HCC): Primary | ICD-10-CM

## 2024-02-19 DIAGNOSIS — E11.65 TYPE 2 DIABETES MELLITUS WITH HYPERGLYCEMIA, WITHOUT LONG-TERM CURRENT USE OF INSULIN (HCC): ICD-10-CM

## 2024-02-19 PROCEDURE — 92229 IMG RTA DETC/MNTR DS POC ALY: CPT | Performed by: STUDENT IN AN ORGANIZED HEALTH CARE EDUCATION/TRAINING PROGRAM

## 2024-02-24 ENCOUNTER — LAB ENCOUNTER (OUTPATIENT)
Dept: LAB | Age: 24
End: 2024-02-24
Attending: STUDENT IN AN ORGANIZED HEALTH CARE EDUCATION/TRAINING PROGRAM
Payer: COMMERCIAL

## 2024-02-24 ENCOUNTER — OFFICE VISIT (OUTPATIENT)
Dept: FAMILY MEDICINE CLINIC | Facility: CLINIC | Age: 24
End: 2024-02-24
Payer: COMMERCIAL

## 2024-02-24 VITALS — SYSTOLIC BLOOD PRESSURE: 129 MMHG | HEART RATE: 91 BPM | DIASTOLIC BLOOD PRESSURE: 84 MMHG | TEMPERATURE: 98 F

## 2024-02-24 DIAGNOSIS — Z30.46 ENCOUNTER FOR REMOVAL AND REINSERTION OF NEXPLANON: Primary | ICD-10-CM

## 2024-02-24 DIAGNOSIS — Z00.00 WELL ADULT EXAM: ICD-10-CM

## 2024-02-24 DIAGNOSIS — B37.31 VAGINAL CANDIDA: ICD-10-CM

## 2024-02-24 DIAGNOSIS — E11.65 TYPE 2 DIABETES MELLITUS WITH HYPERGLYCEMIA, WITHOUT LONG-TERM CURRENT USE OF INSULIN (HCC): ICD-10-CM

## 2024-02-24 LAB
ALBUMIN SERPL-MCNC: 4.7 G/DL (ref 3.2–4.8)
ALBUMIN/GLOB SERPL: 1.7 {RATIO} (ref 1–2)
ALP LIVER SERPL-CCNC: 84 U/L
ALT SERPL-CCNC: 65 U/L
ANION GAP SERPL CALC-SCNC: 7 MMOL/L (ref 0–18)
AST SERPL-CCNC: 44 U/L (ref ?–34)
BILIRUB SERPL-MCNC: 0.5 MG/DL (ref 0.3–1.2)
BUN BLD-MCNC: 9 MG/DL (ref 9–23)
BUN/CREAT SERPL: 14.5 (ref 10–20)
CALCIUM BLD-MCNC: 10.7 MG/DL (ref 8.7–10.4)
CHLORIDE SERPL-SCNC: 108 MMOL/L (ref 98–112)
CHOLEST SERPL-MCNC: 170 MG/DL (ref ?–200)
CO2 SERPL-SCNC: 24 MMOL/L (ref 21–32)
CREAT BLD-MCNC: 0.62 MG/DL
CREAT UR-SCNC: 224.2 MG/DL
DEPRECATED RDW RBC AUTO: 39.1 FL (ref 35.1–46.3)
EGFRCR SERPLBLD CKD-EPI 2021: 126 ML/MIN/1.73M2 (ref 60–?)
ERYTHROCYTE [DISTWIDTH] IN BLOOD BY AUTOMATED COUNT: 12.6 % (ref 11–15)
EST. AVERAGE GLUCOSE BLD GHB EST-MCNC: 174 MG/DL (ref 68–126)
FASTING PATIENT LIPID ANSWER: YES
FASTING STATUS PATIENT QL REPORTED: YES
GLOBULIN PLAS-MCNC: 2.8 G/DL (ref 2.8–4.4)
GLUCOSE BLD-MCNC: 246 MG/DL (ref 70–99)
HBA1C MFR BLD: 7.7 % (ref ?–5.7)
HCT VFR BLD AUTO: 44.3 %
HDLC SERPL-MCNC: 43 MG/DL (ref 40–59)
HGB BLD-MCNC: 15.1 G/DL
LDLC SERPL CALC-MCNC: 104 MG/DL (ref ?–100)
MCH RBC QN AUTO: 29.1 PG (ref 26–34)
MCHC RBC AUTO-ENTMCNC: 34.1 G/DL (ref 31–37)
MCV RBC AUTO: 85.4 FL
MICROALBUMIN UR-MCNC: 9.5 MG/DL
MICROALBUMIN/CREAT 24H UR-RTO: 42.4 UG/MG (ref ?–30)
NONHDLC SERPL-MCNC: 127 MG/DL (ref ?–130)
OSMOLALITY SERPL CALC.SUM OF ELEC: 295 MOSM/KG (ref 275–295)
PLATELET # BLD AUTO: 321 10(3)UL (ref 150–450)
POTASSIUM SERPL-SCNC: 4.4 MMOL/L (ref 3.5–5.1)
PROT SERPL-MCNC: 7.5 G/DL (ref 5.7–8.2)
RBC # BLD AUTO: 5.19 X10(6)UL
SODIUM SERPL-SCNC: 139 MMOL/L (ref 136–145)
TRIGL SERPL-MCNC: 126 MG/DL (ref 30–149)
TSI SER-ACNC: 1.62 MIU/ML (ref 0.55–4.78)
VIT D+METAB SERPL-MCNC: 23 NG/ML (ref 30–100)
VLDLC SERPL CALC-MCNC: 21 MG/DL (ref 0–30)
WBC # BLD AUTO: 10.9 X10(3) UL (ref 4–11)

## 2024-02-24 PROCEDURE — 82043 UR ALBUMIN QUANTITATIVE: CPT

## 2024-02-24 PROCEDURE — 80053 COMPREHEN METABOLIC PANEL: CPT

## 2024-02-24 PROCEDURE — 36415 COLL VENOUS BLD VENIPUNCTURE: CPT

## 2024-02-24 PROCEDURE — 82570 ASSAY OF URINE CREATININE: CPT

## 2024-02-24 PROCEDURE — 83036 HEMOGLOBIN GLYCOSYLATED A1C: CPT

## 2024-02-24 PROCEDURE — 82306 VITAMIN D 25 HYDROXY: CPT

## 2024-02-24 PROCEDURE — 80061 LIPID PANEL: CPT

## 2024-02-24 PROCEDURE — 85027 COMPLETE CBC AUTOMATED: CPT

## 2024-02-24 PROCEDURE — 84443 ASSAY THYROID STIM HORMONE: CPT

## 2024-02-24 NOTE — PROGRESS NOTES
HPI:    Patient ID: Joaquina Ferrari is a 24 year old female.    HPI  Pt presenting for Nexplanon exchange.    S/p exchange 2/15/2024  Complains of persistent arm discomfort  Swelling and fullness have improved from previous  Requesting removal and replacement    Reports vaginal discomfort  No improvement with OTC creams  Does not like PO options      Review of Systems   A comprehensive 10 point review of systems was completed.  Pertinent positives and negatives noted in the the HPI.       Current Outpatient Medications   Medication Sig Dispense Refill    Terconazole 0.8 % Vaginal Cream Place 1 applicator vaginally nightly for 5 days. 60 g 0    ergocalciferol 1.25 MG (47733 UT) Oral Cap Take 1 capsule (50,000 Units total) by mouth once a week. 12 capsule 0    busPIRone 15 MG Oral Tab Take 1 tablet (15 mg total) by mouth every evening. 90 tablet 3    clonazePAM 0.5 MG Oral Tab Take 1 tablet (0.5 mg total) by mouth 3 (three) times daily as needed for Anxiety. 90 tablet 1    clonazePAM 0.25 MG Oral Tablet Dispersible Take 1 tablet (0.25 mg total) by mouth daily as needed. 30 tablet 1    clotrimazole 1 % External Cream Apply 1 Application topically 2 (two) times daily. 195 g 0    lisdexamfetamine (VYVANSE) 40 MG Oral Cap Take 1 capsule (40 mg total) by mouth every morning. 30 capsule 0    [START ON 3/16/2024] lisdexamfetamine (VYVANSE) 40 MG Oral Cap Take 1 capsule (40 mg total) by mouth daily. 30 capsule 0    clonazePAM (KLONOPIN) 0.5 MG Oral Tab Take 1 tablet (0.5 mg total) by mouth at bedtime. 30 tablet 2    albuterol (2.5 MG/3ML) 0.083% Inhalation Nebu Soln Take 3 mL (2.5 mg total) by nebulization every 4 (four) hours as needed for Wheezing. 180 mL 1    atorvastatin 20 MG Oral Tab Take 1 tablet (20 mg total) by mouth nightly. 90 tablet 1    albuterol (PROAIR HFA) 108 (90 Base) MCG/ACT Inhalation Aero Soln Inhale 2 puffs into the lungs every 4 (four) hours as needed for Wheezing. 2 each 1    metFORMIN  MG Oral  Tablet 24 Hr Take 1 tablet (500 mg total) by mouth daily with breakfast. 90 tablet 1     Allergies:No Known Allergies   Vitals:    02/24/24 0815   BP: 129/84   Pulse: 91   Temp: 98 °F (36.7 °C)       There is no height or weight on file to calculate BMI.   PHYSICAL EXAM:   Physical Exam  Vitals reviewed.   Constitutional:       General: She is not in acute distress.  Eyes:      Conjunctiva/sclera: Conjunctivae normal.   Pulmonary:      Effort: Pulmonary effort is normal. No respiratory distress.   Musculoskeletal:      Left upper arm: No swelling.      Cervical back: Normal range of motion.      Comments: Healed incision   Neurological:      Mental Status: She is alert and oriented to person, place, and time. Mental status is at baseline.   Psychiatric:         Mood and Affect: Mood normal.         Behavior: Behavior normal.             ASSESSMENT/PLAN:   1. Encounter for removal and reinsertion of Nexplanon  Procedure Name: Nexplanon Implant Removal  Indication for Removal: Side effects  Location / Side: Left  Pre-Procedure Diagnosis: Nexplanon in place, arm irritation  Post-Procedure Diagnosis: Nexplanon, removed  Informed consent: Procedure, alternate treatment options, risks, and benefits were thoroughly explained to the patient and informed consent was obtained before the procedure started. Risks of the procedure include (but are not limited to) bleeding, infection, difficulty with removal, scarring, and nerve damage. There may be bruising at the site of incision and down the arm.  Equipment for procedure available.  The appropriate time-out was taken. The procedure was confirmed by patient and team. The position correct for the procedure.  The patient is placed in the supine position. Aseptic conditions are maintained. The indio is located by palpation. The area is cleaned with antiseptic. 0.5cc of 1% lidocaine with epinephrine is injected just underneath the end of the implant closest to the elbow. After firmly  pressing down on the end of the implant closer to the axilla a 2-3 mm incision is made with a scalpel. The indio is pushed to the incision site and grasped with a mosquito forceps and gently removed. Blunt dissection WAS NOT needed. The patient DID tolerate the procedure well. The 4 cm indio was removed in its entirety. The incision was dressed with a small adhesive bandage closure and a pressure dressing was applied.  PROCEDURE NOTE     PRE-OP DIAGNOSIS: Patient desires long-term, reversible contraception.  POST-OP DIAGNOSIS: Same  PROCEDURE: Nexplanon placement  Performing Physician: Shahrzad Coffman MD  PROCEDURE:  -Written and verbal informed consent obtained, risks discussed included: bleeding, irregular menses, infection, pain/discomfort, cost for removal.  -Placement on Right due to Left arm side effects  -Patient was instructed to lie supine on the examination table with her non-dominant arm flexed at the elbow and externally rotated so that her hand was underneath her head (or as close as possible).  -The insertion site was identified on the inner side of the non-dominant upper arm, 8 cm from medial epicondyle of the humerus and 4 cm posterior to the sulcus between the biceps and triceps muscles. The insertion site was marked with a sterile marker.  -A second spot (guiding vero) was made with the sterile marker 5 cm proximal to the vero of the insertion site to serve as a direction guide during the Nexplanon insertion.  -The insertion site was confirmed as the correct location on the inner side of the arm.  -The skin was cleaned from the insertion site to the guiding vero with an antiseptic solution Betadine and draped in a sterile fashion.  -Anesthesia was achieved by injecting 2 mL of 1% lidocaine (without epinephrine) just under the skin along the planned insertion tunnel. Anesthesia confirmed.  -The skin was punctured with the tip of the Nexplanon trocar needle slightly angled less than 30°. The needle was  inserted until the bevel was just under the skin.  -The applicator was then lowered to a horizontal position. While lifting the skin with the tip of the needle, the needle was inserted to its full length. Mild resistance was felt but no excessive force was used. The needle slid in easily.  -The applicator was kept in the same position with the needle inserted to its full length. The free hand was used to keep the applicator in the same position. Then the purple slider was unlocked by pushing it slightly down. The purple slider was then moved fully back until it stopped, delivering the Nexplanon capsule subcutaneously. The trocar was removed from the insertion site.  -Nexplanon capsule was palpated by provider and patient to assure satisfactory placement.  -A Bandage and icepack were applied to the area. Patient to keep the pressure dressing for 24 hrs and the bandage for 3-5 days.  -Anticipatory guidance, as well as standard post-procedure care, was explained.  -Return precautions are given. The patient tolerated the procedure well without complications.  -Estimated blood loss was less than 0.5 mL  - REMOVAL, SUB-DERMAL CONTRACEPTIVE IMPLANT [06698]  - INSERTION, NON-BIODEGRADABLE DRUG DELIVERY IMPLANT [94869]  - Etonogestrel IMPL 1 each    2. Vaginal candida  - continue gentle hygiene  - will start PV treatment  - discussed red flags for urgent reevaluation  - Terconazole 0.8 % Vaginal Cream; Place 1 applicator vaginally nightly for 5 days.  Dispense: 60 g; Refill: 0    Pt verbalized understanding and agrees with plan.    No orders of the defined types were placed in this encounter.      Meds This Visit:  Requested Prescriptions     Signed Prescriptions Disp Refills    Terconazole 0.8 % Vaginal Cream 60 g 0     Sig: Place 1 applicator vaginally nightly for 5 days.       Imaging & Referrals:  None         ID#[id number]

## 2024-02-25 DIAGNOSIS — E55.9 VITAMIN D DEFICIENCY: ICD-10-CM

## 2024-02-25 DIAGNOSIS — E83.52 SERUM CALCIUM ELEVATED: Primary | ICD-10-CM

## 2024-02-25 RX ORDER — ERGOCALCIFEROL 1.25 MG/1
50000 CAPSULE ORAL WEEKLY
Qty: 24 CAPSULE | Refills: 0 | Status: SHIPPED | OUTPATIENT
Start: 2024-02-25

## 2024-02-25 RX ORDER — TIRZEPATIDE 12.5 MG/.5ML
12.5 INJECTION, SOLUTION SUBCUTANEOUS WEEKLY
Qty: 4 ML | Refills: 3 | Status: SHIPPED | OUTPATIENT
Start: 2024-02-25

## 2024-03-17 DIAGNOSIS — E11.65 TYPE 2 DIABETES MELLITUS WITH HYPERGLYCEMIA (HCC): ICD-10-CM

## 2024-03-17 NOTE — TELEPHONE ENCOUNTER
Not active on medication list.but last dispensed in December.  Please advise on refill request    Requested Prescriptions     Pending Prescriptions Disp Refills    METFORMIN  MG Oral Tablet 24 Hr [Pharmacy Med Name: Metformin Hydrochloride Er 24hr 500 Mg Tab Gran] 90 tablet 0     Sig: Take 1 tablet (500 mg total) by mouth daily with breakfast.      Recent Visits  Date Type Provider Dept   02/24/24 Office Visit Shahrzad Coffman MD [facility]   02/15/24 Office Visit Shahrzad Coffman MD [facility]   12/01/23 Office Visit Nancy Gallo APRN [facility]   11/20/23 Office Visit Nancy Gallo APRN [facility]   09/02/23 Office Visit Nancy Gallo APRN [facility]   06/05/23 Office Visit Nancy Gallo APRN [facility]   10/20/22 Office Visit Shahrzad Coffman MD [facility]   Showing recent visits within past 540 days with a meds authorizing provider and meeting all other requirements  Future Appointments  No visits were found meeting these conditions.  Showing future appointments within next 150 days with a meds authorizing provider and meeting all other requirements     Requested Prescriptions   Pending Prescriptions Disp Refills    METFORMIN  MG Oral Tablet 24 Hr [Pharmacy Med Name: Metformin Hydrochloride Er 24hr 500 Mg Tab Gran] 90 tablet 0     Sig: Take 1 tablet (500 mg total) by mouth daily with breakfast.       Diabetes Medication Protocol Failed - 3/17/2024  1:31 AM        Failed - Last A1C < 7.5 and within past 6 months     Lab Results   Component Value Date    A1C 7.7 (H) 02/24/2024             Passed - In person appointment or virtual visit in the past 6 mos or appointment in next 3 mos     Recent Outpatient Visits              3 weeks ago Encounter for removal and reinsertion of Musicmetricon    [facility] [place] Shahrzad Coffman MD    Office Visit    3 weeks ago [facility] with feared complaint [facility]  whom no diagnosis was [facility]    1 month ago Well adult exam    [facility]    Office Visit    1 month ago Encounter for removal and reinsertion of etonogestrel [facility]    Office Visit    1 month ago COVID    [facility]    [facility]icine                      Passed - Microalbumin procedure in past 12 months or taking ACE/ARB        Passed - EGFRCR or GFRNAA > 50     GFR Evaluation  EGFRCR: 126 , resulted on 2/24/2024          Passed - GFR in the past 12 months               Recent Outpatient Visits              3 weeks ago Encounter for removal and reinsertion of Nexplanon    [facility]    Office Visit    3 weeks ago Person with feared complaint in whom no diagnosis was made    [facility]    1 month ago Well adult exam    [facility]    Office Visit    1 month ago Encounter for removal and reinsertion of etonogestrel implant    [facility] Elaine Thapa [facility]    Office Visit    1 month ago COVID    [facility]    Telemedicine

## 2024-03-18 DIAGNOSIS — E11.65 TYPE 2 DIABETES MELLITUS WITH HYPERGLYCEMIA, WITHOUT LONG-TERM CURRENT USE OF INSULIN (HCC): ICD-10-CM

## 2024-03-18 NOTE — TELEPHONE ENCOUNTER
Metformin not on patient's current medication list . Inquire if patient only taking monjuaro for diabetes.

## 2024-03-18 NOTE — TELEPHONE ENCOUNTER
Dr Coffman, please advise    Order pended for approval/review.       Patient (name and  verified) states that she is taking the Metformin 500mg but only once daily due to stomach issues.    Patient is not taking the Mounjaro because she does not have insurance at this time. Patient has not taken the Mounjaro for 2 months. Patient would be willing to try the Good Rx for the Mounjaro but is asking if she should continue the same dose or restart at a lower dose?    Last OV 24  Patient currently does not have insurance at this time.

## 2024-03-19 RX ORDER — METFORMIN HYDROCHLORIDE 500 MG/1
500 TABLET, EXTENDED RELEASE ORAL
Qty: 90 TABLET | Refills: 3 | Status: SHIPPED | OUTPATIENT
Start: 2024-03-19 | End: 2025-03-14

## 2024-03-19 NOTE — TELEPHONE ENCOUNTER
GoodRx for Mounjaro is still $1k/month, so I don't think this makes sense for her financially at this time. If she restarts, we should start at 5mg and titrate up every 2 weeks as tolerated.  Will refill Metformin for now.

## 2024-03-20 RX ORDER — ATORVASTATIN CALCIUM 20 MG/1
20 TABLET, FILM COATED ORAL NIGHTLY
Qty: 90 TABLET | Refills: 3 | Status: SHIPPED | OUTPATIENT
Start: 2024-03-20

## 2024-03-20 NOTE — TELEPHONE ENCOUNTER
Refill passed per [facility] protocol.    Requested Prescriptions   Pending Prescriptions Disp Refills    ATORVASTATIN 20 MG Oral Tab [Pharmacy Med Name: Atorvastatin Calcium 20 Mg Tab Nort] 90 tablet 0     Sig: TAKE ONE TABLET BY MOUTH AT BEDTIME       Cholesterol Medication Protocol Passed - 3/18/2024  3:11 PM        Passed - ALT < 80     Lab Results   Component Value Date    ALT 65 (H) 02/24/2024             Passed - ALT resulted within past year        Passed - Lipid panel within past 12 months     Lab Results   Component Value Date    CHOLEST 170 02/24/2024    TRIG 126 02/24/2024    HDL 43 02/24/2024     (H) 02/24/2024    VLDL 21 02/24/2024    NONHDLC 127 02/24/2024             Passed - In person appointment or virtual visit in the past 12 mos or appointment in next 3 mos     Recent Outpatient Visits              3 weeks [facility]    Office Visit    1 month ago Person with feared complaint in whom no diagnosis was made    [facility]    Nurse Only    1 month ago Well adult exam    [facility]    Office Visit    1 month ago Encounter for removal and reinsertion of etonogestrel implant    [facility]    Office Visit    1 month ago COVID    [facility]    Telemedicine

## 2024-04-13 ENCOUNTER — OFFICE VISIT (OUTPATIENT)
Dept: FAMILY MEDICINE CLINIC | Facility: CLINIC | Age: 24
End: 2024-04-13
Payer: MEDICAID

## 2024-04-13 VITALS
RESPIRATION RATE: 18 BRPM | BODY MASS INDEX: 44.41 KG/M2 | DIASTOLIC BLOOD PRESSURE: 62 MMHG | SYSTOLIC BLOOD PRESSURE: 116 MMHG | TEMPERATURE: 101 F | WEIGHT: 293 LBS | OXYGEN SATURATION: 97 % | HEART RATE: 120 BPM | HEIGHT: 68 IN

## 2024-04-13 DIAGNOSIS — J02.9 SORE THROAT: ICD-10-CM

## 2024-04-13 DIAGNOSIS — J11.1 INFLUENZA-LIKE ILLNESS: Primary | ICD-10-CM

## 2024-04-13 PROCEDURE — 87880 STREP A ASSAY W/OPTIC: CPT | Performed by: NURSE PRACTITIONER

## 2024-04-13 PROCEDURE — 99213 OFFICE O/P EST LOW 20 MIN: CPT | Performed by: NURSE PRACTITIONER

## 2024-04-13 RX ORDER — OSELTAMIVIR PHOSPHATE 75 MG/1
75 CAPSULE ORAL 2 TIMES DAILY
Qty: 10 CAPSULE | Refills: 0 | Status: SHIPPED | OUTPATIENT
Start: 2024-04-13 | End: 2024-04-18

## 2024-04-13 NOTE — PROGRESS NOTES
CHIEF COMPLAINT:     Chief Complaint   Patient presents with    Flu     3 days w/Slept for over 20 hours, fever, excessive sweating, shaking, cough, runny/stuffy nose. Hurts to cough, headache ,fatigue, needing inhaler frequently        HPI:   Joaquina Ferrari is a 24 year old female who presents for upper respiratory symptoms for   2-3  days. Patient reports  cough, rhinorrhea, fatigue, sweating, fevers, body aches, sore throat, and headache  . Symptoms have been persiting since onset.  Treating symptoms with inhaler, tylenol, and nyquil/dayquil.   Associated symptoms include see above.    PT has hx of asthma and diabetes. Pt has not checked blood glucose. Decreased appetite reported    Father recently tested positive for influenza. 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     Current Outpatient Medications   Medication Sig Dispense Refill    oseltamivir 75 MG Oral Cap Take 1 capsule (75 mg total) by mouth 2 (two) times daily for 5 days. 10 capsule 0    atorvastatin 20 MG Oral Tab Take 1 tablet (20 mg total) by mouth nightly. 90 tablet 3    metFORMIN  MG Oral Tablet 24 Hr Take 1 tablet (500 mg total) by mouth daily with breakfast. 90 tablet 3    ergocalciferol 1.25 MG (76233 UT) Oral Cap Take 1 capsule (50,000 Units total) by mouth once a week. 12 capsule 0    busPIRone 15 MG Oral Tab Take 1 tablet (15 mg total) by mouth every evening. 90 tablet 3    clonazePAM 0.25 MG Oral Tablet Dispersible Take 1 tablet (0.25 mg total) by mouth daily as needed. 30 tablet 1    clotrimazole 1 % External Cream Apply 1 Application topically 2 (two) times daily. 195 g 0    lisdexamfetamine (VYVANSE) 40 MG Oral Cap Take 1 capsule (40 mg total) by mouth every morning. 30 capsule 0    clonazePAM (KLONOPIN) 0.5 MG Oral Tab Take 1 tablet (0.5 mg total) by mouth at bedtime. 30 tablet 2    albuterol (2.5 MG/3ML) 0.083% Inhalation Nebu Soln Take 3 mL (2.5 mg total) by nebulization every 4 (four) hours as needed for Wheezing. 180 mL 1     albuterol (PROAIR HFA) 108 (90 Base) MCG/ACT Inhalation Aero Soln Inhale 2 puffs into the lungs every 4 (four) hours as needed for Wheezing. 2 each 1    Tirzepatide (MOUNJARO) 12.5 MG/0.5ML Subcutaneous Solution Pen-injector Inject 12.5 mg into the skin once a week. (Patient not taking: Reported on 4/13/2024) 4 mL 3    ergocalciferol 1.25 MG (74070 UT) Oral Cap Take 1 capsule (50,000 Units total) by mouth once a week. (Patient not taking: Reported on 4/13/2024) 24 capsule 0    clonazePAM 0.5 MG Oral Tab Take 1 tablet (0.5 mg total) by mouth 3 (three) times daily as needed for Anxiety. (Patient not taking: Reported on 4/13/2024) 90 tablet 1    lisdexamfetamine (VYVANSE) 40 MG Oral Cap Take 1 capsule (40 mg total) by mouth daily. (Patient not taking: Reported on 4/13/2024) 30 capsule 0      Past Medical History:    ADD (attention deficit disorder)    8/13 AdderallXR 30  REFILL 3/23/12 - Adderall 20 mg XR  10/13/12 - Adderal 20 XR and 10 mg short acting at lunch time    Anxiety    Anxiety state    Asthma    Pulmacort - albuterol prn    Attention deficit disorder    Attention deficit hyperactivity disorder (ADHD)    Binge eating disorder    Diabetes (HCC)    Fatty liver    Hypercholesteremia    Irritability and anger    Low vitamin D level    Migraines    Morbid obesity with BMI of 50.0-59.9, adult (HCC)    Multiple body piercings    Recent bereavement    Type 2 diabetes mellitus with hyperglycemia, without long-term current use of insulin (HCC)    Visual impairment    eyeglasses      Past Surgical History:   Procedure Laterality Date    Tonsillectomy  11/09/2018         Social History     Socioeconomic History    Marital status: Single   Tobacco Use    Smoking status: Never    Smokeless tobacco: Never   Substance and Sexual Activity    Alcohol use: Yes     Alcohol/week: 0.0 standard drinks of alcohol     Comment: OCC    Drug use: No    Sexual activity: Yes     Partners: Male     Birth control/protection: Condom   Other  Topics Concern    Caffeine Concern Yes     Comment: soda         REVIEW OF SYSTEMS:   GENERAL: decreased appetite  SKIN: no rashes or abnormal skin lesions  HEENT: See HPI  LUNGS: Pt reports SOB which she attributes to her chest and nasal congestion. No wheezing. See HPI  CARDIOVASCULAR: chest pain only with coughing.   GI: denies N/V/C or abdominal pain; diarrhea twice yesterday - not black or bloody   NEURO: + headaches    EXAM:   /62   Pulse 120   Temp (!) 101.2 °F (38.4 °C) (Tympanic)   Resp 18   Ht 5' 8\" (1.727 m)   Wt (!) 322 lb (146.1 kg)   LMP 03/14/2024 (Approximate)   SpO2 97%   BMI 48.96 kg/m²   GENERAL: well developed, well nourished,in no apparent distress. Pleasant.   SKIN: no rashes,no suspicious lesions  HEAD: atraumatic, normocephalic.  No tenderness on palpation of  sinuses  EYES: conjunctiva clear, EOM intact  EARS: TM's gray, no bulging, no retraction,no fluid, bony landmarks visible  NOSE: Nostrils patent, clear nasal discharge, nasal mucosa pink   THROAT: Oral mucosa pink, moist. Posterior pharynx is  erythematous. no exudates. Tonsils 1/4. Uvula midline    NECK: Supple, non-tender  LUNGS: clear to auscultation bilaterally, no wheezes or rhonchi. Breathing is non labored. Speaking in full sentences without difficulty.   CARDIO: Elevated HR, regular and without murmur  EXTREMITIES: no cyanosis, clubbing or edema  LYMPH:  no lymphadenopathy.      Recent Results (from the past 168 hour(s))   Strep A Assay W/Optic    Collection Time: 04/13/24  4:25 PM   Result Value Ref Range    Strep Grp A Screen negative Negative    Control Line Present with a clear background (yes/no) yes Yes/No    Kit Lot # [id number] Numeric    Kit Expiration Date 3/1/25 Date         ASSESSMENT AND PLAN:   Joaquina Ferrari is a 24 year old female who presents with upper respiratory symptoms that are consistent with    ASSESSMENT:   Encounter Diagnoses   Name Primary?    Sore throat     Influenza-like illness Yes        PLAN: Meds as below.  Comfort care as described in Patient Instructions    Meds & Refills for this Visit:  Requested Prescriptions     Signed Prescriptions Disp Refills    oseltamivir 75 MG Oral Cap 10 capsule 0     Sig: Take 1 capsule (75 mg total) by mouth 2 (two) times daily for 5 days.     Rapid strep is negative.     Likely influenza as has direct contact with father.     May continue to use inhaler as needed however discussed with patient if worsening SOB, chest pain, or symptoms to seek emergency care immediately.     Discussed case with Dr. Cruz, she is in agreement with treatment plan.     Will start Tamiflu as directed.     Discussed s/s of worsening infection/condition with Patient and importance of prompt medical re-evaluation including when to seek emergency care. Patient  voiced understanding    Increase fluids and rest. Humidified air. Warm steamy showers.     May consider OTC tylenol or ibuprofen as needed and directed on packaging for pain/fever    May consider OTC dextromethorphan as needed and directed on packaging as a cough suppressant     May consider OTC phenylephrine or pseudoephedrine as needed and directed on packaging as a nasal decongestant    May consider OTC Cepacol throat lozenges as needed and directed on packaging for sore throat.     Risks, benefits, and side effects of medication discussed. Patient  verbalized understanding and agreement with treatment plan.     All questions and concerns addressed. Encouraged Patient  to call clinic with any questions or concerns. I explained to the patient that emergent conditions may arise and to go to the ER for new, worsening or any persistent conditions.  No acute distress and cleared for home.    Patient Instructions   See attached patient care instructions.      The patient indicates understanding of these issues and agrees to the plan.  The patient is asked to return if sx's persist or worsen.

## 2024-04-15 ENCOUNTER — TELEPHONE (OUTPATIENT)
Dept: FAMILY MEDICINE CLINIC | Facility: CLINIC | Age: 24
End: 2024-04-15

## 2024-04-15 NOTE — TELEPHONE ENCOUNTER
Patient contacts clinic reporting daily vaginal bleeding since changing nexplanon on Feb 24th.  Bleeding began about a week later.  Using 1 tampon per day.  Dark blood with clots, bright red blood after sexual activity.  Denies pelvic cramping or pain.  Denies dizziness or lightheadedness.  Inquires if this is normal and will improve or if she should be seen.  New insurance and cannot come back to Dr Coffman but was told to call.  Please advise.

## 2024-04-18 NOTE — TELEPHONE ENCOUNTER
Patient called stating she has not received a callback from  and would like a callback from her in regards to this.

## 2024-04-18 NOTE — TELEPHONE ENCOUNTER
Spoke with patient and advised message has been sent to provider for further recommendations.   Patient verbalized understanding.

## 2024-06-07 ENCOUNTER — NURSE TRIAGE (OUTPATIENT)
Dept: FAMILY MEDICINE CLINIC | Facility: CLINIC | Age: 24
End: 2024-06-07

## 2024-06-07 DIAGNOSIS — R05.1 ACUTE COUGH: Primary | ICD-10-CM

## 2024-06-07 RX ORDER — AZITHROMYCIN 250 MG/1
TABLET, FILM COATED ORAL
Qty: 6 TABLET | Refills: 0 | Status: SHIPPED | OUTPATIENT
Start: 2024-06-07 | End: 2024-06-11

## 2024-06-07 RX ORDER — ALBUTEROL SULFATE 90 UG/1
2 AEROSOL, METERED RESPIRATORY (INHALATION) EVERY 4 HOURS PRN
Qty: 1 EACH | Refills: 0 | Status: SHIPPED | OUTPATIENT
Start: 2024-06-07 | End: 2025-06-07

## 2024-06-07 NOTE — TELEPHONE ENCOUNTER
Azithromycin sent. Encouraged to use albuterol PRN for cough, dyspnea.  UC/ER precautions. To follow-up if symptoms persist after 1 week.

## 2024-06-07 NOTE — TELEPHONE ENCOUNTER
Spoke with Patient. Informed her of Dr. Reese's message, verbalized understanding and agreed with plan.  Pharmacy verified.

## 2024-06-07 NOTE — TELEPHONE ENCOUNTER
Action Requested: Summary for Provider     []  Critical Lab, Recommendations Needed  [x] Need Additional Advice  []   FYI    []   Need Orders  [x] Need Medications Sent to Pharmacy  []  Other     SUMMARY: Patient asking if  can prescribe medication. Patient still waiting to switch insurance plans.    Patient states she was visiting [place] from Sunday 6/1/24 until yesterday.6/5/24, Person she was visiting had pneumonia and is now hospitalized.     Patient states her symptoms started 3 days ago with chest congestion and barky cough Patient has asthma.      Patient denies fever, chest pain, nasal congestion or wheezing.    Patient coughing at time of call.     Reason for call: Cough  Onset: 3 days.  Reason for Disposition   Continuous (nonstop) coughing interferes with work or school and no improvement using cough treatment per Care Advice    Protocols used: Cough-A-[place]

## 2024-08-06 DIAGNOSIS — E11.65 TYPE 2 DIABETES MELLITUS WITH HYPERGLYCEMIA, WITHOUT LONG-TERM CURRENT USE OF INSULIN (HCC): ICD-10-CM

## 2024-08-06 DIAGNOSIS — F41.9 ANXIETY DISORDER, UNSPECIFIED: ICD-10-CM

## 2024-08-09 ENCOUNTER — TELEPHONE (OUTPATIENT)
Dept: FAMILY MEDICINE CLINIC | Facility: CLINIC | Age: 24
End: 2024-08-09

## 2024-08-09 RX ORDER — CLONAZEPAM 0.5 MG/1
0.5 TABLET ORAL 3 TIMES DAILY PRN
Qty: 90 TABLET | Refills: 1 | OUTPATIENT
Start: 2024-08-09

## 2024-08-09 RX ORDER — CLONAZEPAM 0.5 MG/1
0.5 TABLET ORAL 3 TIMES DAILY PRN
Qty: 90 TABLET | Refills: 1 | Status: SHIPPED | OUTPATIENT
Start: 2024-08-09

## 2024-08-09 RX ORDER — ERGOCALCIFEROL 1.25 MG/1
50000 CAPSULE ORAL WEEKLY
Qty: 12 CAPSULE | Refills: 0 | Status: SHIPPED | OUTPATIENT
Start: 2024-08-09

## 2024-08-09 NOTE — TELEPHONE ENCOUNTER
Patient has a sister with special needs.  Patient is requesting Dr Coffman to complete forms [place] state of [place] so this patient can get a parking placard to assist with her sisters care.       Call patient to advise her:  [phone]

## 2024-08-09 NOTE — TELEPHONE ENCOUNTER
Please review. Protocol Failed; No Protocol    Requested Prescriptions   Pending Prescriptions Disp Refills    Tirzepatide (MOUNJARO) 12.5 MG/0.5ML Subcutaneous Solution Pen-injector 4 mL 3     Sig: Inject 12.5 mg into the skin once a week.       Diabetes Medication Protocol Failed - 8/6/2024  8:47 PM        Failed - Last A1C < 7.5 and within past 6 months     Lab Results   Component Value Date    A1C 7.7 (H) 02/24/2024             Passed - In person appointment or virtual visit in the past 6 mos or appointment in next 3 mos     Recent Outpatient Visits              3 months ago Influenza-like illness    [facility]    Office Visit    5 months ago Encounter for removal and reinsertion of Nexplanon    [facility]    Office Visit    5 months ago Type 2 diabetes mellitus with hyperglycemia, without long-term current use of insulin (HCC)    [facility] [address], [place]    Nurse Only    5 months ago Person with feared complaint in whom no diagnosis was made    [facility] [place]    Nurse Only    5 months ago Well adult exam    [facility]    Office Visit                      Passed - Microalbumin procedure in past 12 months or taking ACE/ARB        Passed - EGFRCR or GFRNAA > 50     GFR Evaluation  EGFRCR: 126 , resulted on 2/24/2024          Passed - GFR in the past 12 months         Refused Prescriptions Disp Refills    clonazePAM 0.5 MG Oral Tab 90 tablet 1     Sig: Take 1 tablet (0.5 mg total) by mouth 3 (three) times daily as needed for Anxiety.       Controlled Substance Medication Failed - 8/6/2024  8:47 PM        Failed - This medication is a controlled substance - forward to provider to refill                 Recent Outpatient  Visits              3 months ago Influenza-like illness    [facility]    Office Visit    5 months ago Encounter for removal and reinsertion of Nexplanon    [facility] Yasmin Aburto Karen Marie, MD    Office Visit    5 months ago Type 2 diabetes mellitus with hyperglycemia, without long-term current use of insulin (HCC)    [facility]    [facility]    5 months ago Person with feared complaint in whom no diagnosis was made    [facility]    [facility]    5 months ago Well adult exam    [facility]    Office Visit

## 2024-08-09 NOTE — TELEPHONE ENCOUNTER
"     Patient ID: Chante Wilson is a 56 y.o. female.  Block is worn off pain is controlled      Objective:    /74 (BP Location: Left arm, Patient Position: Lying)   Pulse 79   Temp 97.6 °F (36.4 °C) (Oral)   Resp 16   Ht 167.6 cm (66\")   Wt 95.5 kg (210 lb 8.6 oz)   SpO2 99%   BMI 33.98 kg/m²     Physical Examination:    Toes are warm and perfused wiggles her toes with intact sensation    Imaging:      Assessment:  Doing well after ORIF ankle    Plan:  Okay to discharge home nonweightbearing aspirin for DVT prophylaxis see me in the office in 2 weeks    Procedures  " Please review. Protocol Failed; No Protocol      Recent fills: 2/22/2024, 5/16/2024  Last Rx written: 2/15/2024  Last office visit: 2/24/2024        Requested Prescriptions   Pending Prescriptions Disp Refills    CLONAZEPAM 0.5 MG Oral Tab [Pharmacy Med Name: CLONAZEPAM 0.5 MG TABLET] 90 tablet 1     Sig: Take 1 tablet (0.5 mg total) by mouth 3 (three) times daily as needed for Anxiety.       Controlled Substance Medication Failed - 8/6/2024  8:44 PM        Failed - This medication is a controlled substance - forward to provider to refill                 Recent Outpatient Visits              3 months ago Influenza-like illness    [facility]    Office Visit    5 months ago Encounter for removal and reinsertion of Nexplanon    [facility] Yasmin Aburto Karen Marie, MD    Office Visit    5 months ago Type 2 diabetes mellitus with hyperglycemia, without long-term current use of insulin (HCC)    [facility] [address], [place]    Nurse Only    5 months ago Person with feared complaint in whom no diagnosis was made    [facility]    [facility]    5 months ago Well adult exam    [facility]    Office Visit

## 2024-08-09 NOTE — TELEPHONE ENCOUNTER
Please review. Protocol Failed; No Protocol        Component  Ref Range & Units 2/24/24  8:45 AM   Vitamin D, 25OH, Total  30.0 - 100.0 ng/mL 23.0 Low           Requested Prescriptions   Pending Prescriptions Disp Refills    ergocalciferol 1.25 MG (23124 UT) Oral Cap 12 capsule 0     Sig: Take 1 capsule (50,000 Units total) by mouth once a week.       There is no refill protocol information for this order              Recent Outpatient Visits              3 months ago Influenza-like illness    [facility]    Office Visit    5 months ago Encounter for removal and reinsertion of Nexplanon    [facility] Yasmin Aburto Karen Marie, MD    Office Visit    5 months ago Type 2 diabetes mellitus with hyperglycemia, without long-term current use of insulin (HCC)    [facility] [address], [place]    Nurse Only    5 months ago Person with feared complaint in whom no diagnosis was made    [facility] [address], [place]    Nurse Only    5 months ago Well adult exam    [facility]    Office Visit

## 2024-08-15 NOTE — TELEPHONE ENCOUNTER
If she is responsible for transporting sister to/from appts, she can apply for handicap placard.  She can download forms online ([place] handicap placard form) and drop off form for completion.  To either  from [facility] once complete or we can mail out.

## 2024-08-21 DIAGNOSIS — F41.9 ANXIETY DISORDER, UNSPECIFIED: ICD-10-CM

## 2024-08-22 DIAGNOSIS — E11.65 TYPE 2 DIABETES MELLITUS WITH HYPERGLYCEMIA, WITHOUT LONG-TERM CURRENT USE OF INSULIN (HCC): ICD-10-CM

## 2024-08-22 RX ORDER — CLONAZEPAM 0.5 MG/1
0.5 TABLET ORAL 3 TIMES DAILY PRN
Qty: 90 TABLET | Refills: 1 | OUTPATIENT
Start: 2024-08-22

## 2024-08-22 NOTE — TELEPHONE ENCOUNTER
Clonazepam 0.5m day supply sent to [facility] in [place] on 2024

## 2024-08-23 RX ORDER — TIRZEPATIDE 12.5 MG/.5ML
12.5 INJECTION, SOLUTION SUBCUTANEOUS WEEKLY
Qty: 4 ML | Refills: 3 | Status: SHIPPED | OUTPATIENT
Start: 2024-08-23

## 2024-08-23 NOTE — TELEPHONE ENCOUNTER
Please review. Protocol Failed; No Protocol    Please see patients My Chart message:        Tirzepatide (MOUNJARO) 12.5 MG/0.5ML Subcutaneous Solution Pen-injector [Shahrzad Coffman]      Patient Comment: Hi, I [facility] to [facility] but its on back order and they dont know when it will be available. Is there anyway we can [facility] in [place] [facility]? So i can see if it’s available there. I’m seeing really good results and I don't want to back track     Requested Prescriptions   Pending Prescriptions Disp Refills    Tirzepatide (MOUNJARO) 12.5 MG/0.5ML Subcutaneous Solution Pen-injector 4 mL 3     Sig: Inject 12.5 mg into the skin once a week.       Diabetes Medication Protocol Failed - 8/22/2024  4:34 PM        Failed - Last A1C < 7.5 and within past 6 months     Lab Results   Component Value Date    A1C 7.7 (H) 02/24/2024             Failed - In person appointment or virtual visit in the past 6 mos or appointment in next 3 mos     Recent Outpatient Visits              4 months ago Influenza-like illness    [facility], [facility] Marcelino Hanson Jr., APN    Office Visit    6 months ago Encounter for removal and reinsertion of Nexplanon    [facility] [place] [facility]    Office Visit    6 months ago Type 2 diabetes mellitus with hyperglycemia, without long-term current use of insulin (HCC)    [facility] [place]    Nurse Only    6 months ago Person with feared complaint in whom no diagnosis was made    [facility] [place]    Nurse Only    6 months ago Well adult exam    [facility]    Office Visit                      Passed - Microalbumin procedure in past 12 months or taking ACE/ARB        Passed - EGFRCR or GFRNAA > 50     GFR  Evaluation  EGFRCR: 126 , resulted on 2/24/2024          Passed - GFR in the past 12 months                 Recent Outpatient Visits              4 months ago Influenza-like illness    [facility]    Office Visit    6 months ago Encounter for removal and reinsertion of Nexplanon    [facility]    Office Visit    6 months ago Type 2 diabetes mellitus with hyperglycemia, without long-term current use of insulin (HCC)    [facility] [address], [place]    Nurse Only    6 months ago Person with feared complaint in whom no diagnosis was made    [facility]    Nurse Only    6 months ago Well adult exam    [facility] Yasmin Aburto Karen Marie, MD    Office Visit

## 2024-09-03 ENCOUNTER — OFFICE VISIT (OUTPATIENT)
Dept: FAMILY MEDICINE CLINIC | Facility: CLINIC | Age: 24
End: 2024-09-03
Payer: MEDICAID

## 2024-09-03 VITALS
HEART RATE: 95 BPM | BODY MASS INDEX: 35.61 KG/M2 | HEIGHT: 68 IN | DIASTOLIC BLOOD PRESSURE: 82 MMHG | TEMPERATURE: 98 F | OXYGEN SATURATION: 98 % | WEIGHT: 235 LBS | SYSTOLIC BLOOD PRESSURE: 132 MMHG | RESPIRATION RATE: 18 BRPM

## 2024-09-03 DIAGNOSIS — H92.02 LEFT EAR PAIN: Primary | ICD-10-CM

## 2024-09-03 PROCEDURE — 99213 OFFICE O/P EST LOW 20 MIN: CPT | Performed by: NURSE PRACTITIONER

## 2024-09-03 RX ORDER — OFLOXACIN 3 MG/ML
10 SOLUTION AURICULAR (OTIC) DAILY
Qty: 5 ML | Refills: 0 | Status: SHIPPED | OUTPATIENT
Start: 2024-09-03 | End: 2024-09-10

## 2024-09-03 NOTE — PROGRESS NOTES
Subjective:   Patient ID: Joaquina Ferrari is a 24 year old female.    Patient is a 24 year old female who presents today with complaints of left sided headache x last night and left sided ear pain/sore throat x today. Has been swimming a lot recently. Feels wet in the left ear. Painful to touch the external ear and to chew on left side. Pain radiating down left side of throat. Denies drainage from ears, decreased hearing, fevers, body aches, chills, URI symptoms, current headache, recent dental work or dental pain. Tolerating PO well at home. Attempted treatment prior to arrival = Tylenol and Motrin.         History/Other:   Review of Systems   Constitutional:  Negative for appetite change, chills and fever.   HENT:  Positive for ear pain and sore throat. Negative for congestion, dental problem, ear discharge, hearing loss and rhinorrhea.    Respiratory:  Negative for cough.    Musculoskeletal:  Negative for myalgias.   Neurological:  Positive for headaches.     Current Outpatient Medications   Medication Sig Dispense Refill    ofloxacin 0.3 % Otic Solution Place 10 drops into the left ear daily for 7 days. 5 mL 0    Tirzepatide (MOUNJARO) 12.5 MG/0.5ML Subcutaneous Solution Pen-injector Inject 12.5 mg into the skin once a week. 4 mL 3    ergocalciferol 1.25 MG (18553 UT) Oral Cap Take 1 capsule (50,000 Units total) by mouth once a week. 12 capsule 0    albuterol 108 (90 Base) MCG/ACT Inhalation Aero Soln Inhale 2 puffs into the lungs every 4 (four) hours as needed for Wheezing or Shortness of Breath (cough). 1 each 0    atorvastatin 20 MG Oral Tab Take 1 tablet (20 mg total) by mouth nightly. 90 tablet 3    metFORMIN  MG Oral Tablet 24 Hr Take 1 tablet (500 mg total) by mouth daily with breakfast. 90 tablet 3    busPIRone 15 MG Oral Tab Take 1 tablet (15 mg total) by mouth every evening. 90 tablet 3    clonazePAM 0.25 MG Oral Tablet Dispersible Take 1 tablet (0.25 mg total) by mouth daily as needed. 30  tablet 1    clotrimazole 1 % External Cream Apply 1 Application topically 2 (two) times daily. 195 g 0    lisdexamfetamine (VYVANSE) 40 MG Oral Cap Take 1 capsule (40 mg total) by mouth every morning. 30 capsule 0    clonazePAM (KLONOPIN) 0.5 MG Oral Tab Take 1 tablet (0.5 mg total) by mouth at bedtime. 30 tablet 2    albuterol (2.5 MG/3ML) 0.083% Inhalation Nebu Soln Take 3 mL (2.5 mg total) by nebulization every 4 (four) hours as needed for Wheezing. 180 mL 1    albuterol (PROAIR HFA) 108 (90 Base) MCG/ACT Inhalation Aero Soln Inhale 2 puffs into the lungs every 4 (four) hours as needed for Wheezing. 2 each 1    clonazePAM 0.5 MG Oral Tab Take 1 tablet (0.5 mg total) by mouth 3 (three) times daily as needed for Anxiety. (Patient not taking: Reported on 9/3/2024) 90 tablet 1    ergocalciferol 1.25 MG (73286 UT) Oral Cap Take 1 capsule (50,000 Units total) by mouth once a week. (Patient not taking: Reported on 4/13/2024) 24 capsule 0     Allergies:No Known Allergies    /82   Pulse 95   Temp 98.3 °F (36.8 °C)   Resp 18   Ht 5' 8\" (1.727 m)   Wt 235 lb (106.6 kg)   LMP 03/14/2024 (Approximate)   SpO2 98%   BMI 35.73 kg/m²       Objective:   Physical Exam  Vitals reviewed.   Constitutional:       General: She is awake. She is not in acute distress.     Appearance: Normal appearance. She is well-developed and well-groomed. She is not ill-appearing, toxic-appearing or diaphoretic.   HENT:      Head: Normocephalic and atraumatic.      Right Ear: Tympanic membrane and external ear normal. Swelling present. No drainage or tenderness. No mastoid tenderness. Tympanic membrane is not injected, erythematous or bulging.      Left Ear: Tympanic membrane and external ear normal. Tenderness present. No drainage or swelling. No mastoid tenderness. Tympanic membrane is not injected, erythematous or bulging.      Ears:      Comments: Pain with palpation of tragus and movement of pinna, pain with insertion of otoscope tip  into left ear canal     Nose: Nose normal.      Mouth/Throat:      Lips: Pink.      Mouth: Mucous membranes are moist. No oral lesions.      Pharynx: Oropharynx is clear. Uvula midline.      Tonsils: 0 on the right. 0 on the left.   Cardiovascular:      Rate and Rhythm: Normal rate and regular rhythm.   Pulmonary:      Effort: Pulmonary effort is normal. No respiratory distress.      Breath sounds: Normal air entry. No decreased breath sounds, wheezing, rhonchi or rales.   Lymphadenopathy:      Cervical: No cervical adenopathy.   Skin:     General: Skin is warm and dry.   Neurological:      Mental Status: She is alert and oriented to person, place, and time.   Psychiatric:         Behavior: Behavior is cooperative.         Assessment & Plan:   1. Left ear pain        No orders of the defined types were placed in this encounter.      Meds This Visit:  Requested Prescriptions     Signed Prescriptions Disp Refills    ofloxacin 0.3 % Otic Solution 5 mL 0     Sig: Place 10 drops into the left ear daily for 7 days.     Reassuring physical exam findings. Vitals WNL.   Due to tenderness of ear canal and recent swimming - will start Ofloxacin drops today.   Patient flying next week, advised to use Flonase daily.  Supportive care and return to care measures reviewed.  Patient v/u and is comfortable with this plan.    Patient Instructions   Tylenol and Motrin as needed  Use Ofloxacin drops as prescribed   START Flonase daily  Return to care for new/worsening symptoms

## 2024-09-04 NOTE — PATIENT INSTRUCTIONS
Tylenol and Motrin as needed  Use Ofloxacin drops as prescribed   START Flonase daily  Return to care for new/worsening symptoms

## 2024-10-24 ENCOUNTER — OFFICE VISIT (OUTPATIENT)
Dept: FAMILY MEDICINE CLINIC | Facility: CLINIC | Age: 24
End: 2024-10-24
Payer: MEDICAID

## 2024-10-24 VITALS
SYSTOLIC BLOOD PRESSURE: 134 MMHG | WEIGHT: 293 LBS | DIASTOLIC BLOOD PRESSURE: 84 MMHG | BODY MASS INDEX: 44.41 KG/M2 | HEIGHT: 68 IN | HEART RATE: 105 BPM

## 2024-10-24 DIAGNOSIS — F50.819 BINGE EATING DISORDER: ICD-10-CM

## 2024-10-24 DIAGNOSIS — Z23 NEED FOR VACCINATION: ICD-10-CM

## 2024-10-24 DIAGNOSIS — N92.6 IRREGULAR MENSES: ICD-10-CM

## 2024-10-24 DIAGNOSIS — Z80.9 FAMILY HISTORY OF CANCER: ICD-10-CM

## 2024-10-24 DIAGNOSIS — E11.65 TYPE 2 DIABETES MELLITUS WITH HYPERGLYCEMIA, WITHOUT LONG-TERM CURRENT USE OF INSULIN (HCC): Primary | ICD-10-CM

## 2024-10-24 LAB — HEMOGLOBIN A1C: 7 % (ref 4.3–5.6)

## 2024-10-24 PROCEDURE — 90656 IIV3 VACC NO PRSV 0.5 ML IM: CPT | Performed by: STUDENT IN AN ORGANIZED HEALTH CARE EDUCATION/TRAINING PROGRAM

## 2024-10-24 PROCEDURE — 90471 IMMUNIZATION ADMIN: CPT | Performed by: STUDENT IN AN ORGANIZED HEALTH CARE EDUCATION/TRAINING PROGRAM

## 2024-10-24 PROCEDURE — 83036 HEMOGLOBIN GLYCOSYLATED A1C: CPT | Performed by: STUDENT IN AN ORGANIZED HEALTH CARE EDUCATION/TRAINING PROGRAM

## 2024-10-24 PROCEDURE — 99214 OFFICE O/P EST MOD 30 MIN: CPT | Performed by: STUDENT IN AN ORGANIZED HEALTH CARE EDUCATION/TRAINING PROGRAM

## 2024-10-24 RX ORDER — LISDEXAMFETAMINE DIMESYLATE 40 MG/1
40 CAPSULE ORAL EVERY MORNING
Qty: 30 CAPSULE | Refills: 0 | Status: SHIPPED | OUTPATIENT
Start: 2024-11-20

## 2024-10-24 RX ORDER — LISDEXAMFETAMINE DIMESYLATE 40 MG/1
40 CAPSULE ORAL EVERY MORNING
Qty: 30 CAPSULE | Refills: 0 | Status: SHIPPED | OUTPATIENT
Start: 2024-10-24

## 2024-10-24 RX ORDER — TIRZEPATIDE 15 MG/.5ML
15 INJECTION, SOLUTION SUBCUTANEOUS WEEKLY
Qty: 2 ML | Refills: 0 | Status: SHIPPED | OUTPATIENT
Start: 2024-10-24

## 2024-10-24 RX ORDER — LISDEXAMFETAMINE DIMESYLATE 40 MG/1
40 CAPSULE ORAL EVERY MORNING
Qty: 30 CAPSULE | Refills: 0 | Status: SHIPPED | OUTPATIENT
Start: 2024-12-17

## 2024-10-24 NOTE — PROGRESS NOTES
HPI:    Patient ID: Joaquina Ferrari is a 24 year old female.    HPI  Pt presenting for follow-up.    S/p Nexplanon exchange 2/15/2024  Last period April 2024 heavier flow  Occasional spotting, last a few weeks ago  Still gets cyclical breast tenderness  Considering implant removal  Interested in discontinuing hormones    H/o T2DM  Has been on Mounjaro 12.5mg  Reports occasional vomiting, unclear if related to GLP1  Reports home weight 314.2lbs today  States current dosing seems to wear off after 5 days      Review of Systems   A comprehensive 10 point review of systems was completed.  Pertinent positives and negatives noted in the the HPI.       Current Outpatient Medications   Medication Sig Dispense Refill    lisdexamfetamine (VYVANSE) 40 MG Oral Cap Take 1 capsule (40 mg total) by mouth every morning. 30 capsule 0    [START ON 11/20/2024] lisdexamfetamine (VYVANSE) 40 MG Oral Cap Take 1 capsule (40 mg total) by mouth every morning. 30 capsule 0    [START ON 12/17/2024] lisdexamfetamine (VYVANSE) 40 MG Oral Cap Take 1 capsule (40 mg total) by mouth every morning. 30 capsule 0    Tirzepatide (MOUNJARO) 15 MG/0.5ML Subcutaneous Solution Auto-injector Inject 15 mg into the skin once a week. 2 mL 0    Tirzepatide (MOUNJARO) 12.5 MG/0.5ML Subcutaneous Solution Pen-injector Inject 12.5 mg into the skin once a week. 4 mL 3    clonazePAM 0.5 MG Oral Tab Take 1 tablet (0.5 mg total) by mouth 3 (three) times daily as needed for Anxiety. 90 tablet 1    albuterol 108 (90 Base) MCG/ACT Inhalation Aero Soln Inhale 2 puffs into the lungs every 4 (four) hours as needed for Wheezing or Shortness of Breath (cough). 1 each 0    atorvastatin 20 MG Oral Tab Take 1 tablet (20 mg total) by mouth nightly. 90 tablet 3    metFORMIN  MG Oral Tablet 24 Hr Take 1 tablet (500 mg total) by mouth daily with breakfast. 90 tablet 3    ergocalciferol 1.25 MG (19362 UT) Oral Cap Take 1 capsule (50,000 Units total) by mouth once a week. 24  capsule 0    busPIRone 15 MG Oral Tab Take 1 tablet (15 mg total) by mouth every evening. 90 tablet 3    clonazePAM 0.25 MG Oral Tablet Dispersible Take 1 tablet (0.25 mg total) by mouth daily as needed. 30 tablet 1    clotrimazole 1 % External Cream Apply 1 Application topically 2 (two) times daily. 195 g 0    albuterol (2.5 MG/3ML) 0.083% Inhalation Nebu Soln Take 3 mL (2.5 mg total) by nebulization every 4 (four) hours as needed for Wheezing. 180 mL 1    albuterol (PROAIR HFA) 108 (90 Base) MCG/ACT Inhalation Aero Soln Inhale 2 puffs into the lungs every 4 (four) hours as needed for Wheezing. 2 each 1     Allergies:Allergies[1]   Vitals:    10/24/24 1651   BP: 134/84   Pulse: 105   Weight: (!) 317 lb (143.8 kg)   Height: 5' 8\" (1.727 m)       Body mass index is 48.2 kg/m².   PHYSICAL EXAM:   Physical Exam  Vitals reviewed.   Constitutional:       General: She is not in acute distress.  HENT:      Head: Normocephalic.   Eyes:      Conjunctiva/sclera: Conjunctivae normal.   Cardiovascular:      Rate and Rhythm: Normal rate.      Pulses: Normal pulses.   Pulmonary:      Effort: Pulmonary effort is normal. No respiratory distress.      Breath sounds: Normal breath sounds.   Chest:   Breasts:     Right: No mass or nipple discharge.      Left: No mass or nipple discharge.   Musculoskeletal:      Cervical back: Normal range of motion. No tenderness.      Right lower leg: No edema.      Left lower leg: No edema.   Lymphadenopathy:      Cervical: No cervical adenopathy.      Upper Body:      Right upper body: No supraclavicular or axillary adenopathy.      Left upper body: No supraclavicular or axillary adenopathy.   Neurological:      Mental Status: She is alert and oriented to person, place, and time. Mental status is at baseline.   Psychiatric:         Mood and Affect: Mood normal.         Behavior: Behavior normal.             ASSESSMENT/PLAN:   1. Type 2 diabetes mellitus with hyperglycemia, without long-term current  use of insulin (HCC)  POC A1c 7.0, improved from prior 7.7  - counseled on healthy diet and lifestyle changes for overall wellness, which includes decreased carb and sugar intake, increased fiber intake, and increased water intake as tolerated  - discussed exercise as able  - continue Metformin, statin dosing  - will uptitrate Mounjaro and monitor response  - POC Glycohemoglobin [87354]  - Tirzepatide (MOUNJARO) 15 MG/0.5ML Subcutaneous Solution Auto-injector; Inject 15 mg into the skin once a week.  Dispense: 2 mL; Refill: 0    2. Need for vaccination  - Fluzone trivalent vaccine, PF 0.5mL, 6mo+ (46580)    3. Irregular menses  Discussed DDx  - will check labs per request  - consider Nexplanon removal  - Testosterone,Total and Weakly Bound w/ SHBG; Future  - Anti-Mullerian Hormone; Future  - FSH [E]; Future  - Estradiol [E]; Future  - Progesterone [E]; Future  - Prolactin [E]; Future  - Cortisol [E]; Future    4. Binge eating disorder  Will restart dosing  I have reviewed the [place] Prescription Monitoring Program. This patient is appropriate for Vyvanse refill.  - lisdexamfetamine (VYVANSE) 40 MG Oral Cap; Take 1 capsule (40 mg total) by mouth every morning.  Dispense: 30 capsule; Refill: 0  - lisdexamfetamine (VYVANSE) 40 MG Oral Cap; Take 1 capsule (40 mg total) by mouth every morning.  Dispense: 30 capsule; Refill: 0  - lisdexamfetamine (VYVANSE) 40 MG Oral Cap; Take 1 capsule (40 mg total) by mouth every morning.  Dispense: 30 capsule; Refill: 0    5. Family history of cancer    Pt verbalized understanding and agrees with plan.      Orders Placed This Encounter   Procedures    POC Glycohemoglobin [52604]    Testosterone,Total and Weakly Bound w/ SHBG    Anti-Mullerian Hormone    FSH [E]    Estradiol [E]    Progesterone [E]    Prolactin [E]    Cortisol [E]    Fluzone trivalent vaccine, PF 0.5mL, 6mo+ (01086)       Meds This Visit:  Requested Prescriptions     Signed Prescriptions Disp Refills     lisdexamfetamine (VYVANSE) 40 MG Oral Cap 30 capsule 0     Sig: Take 1 capsule (40 mg total) by mouth every morning.    lisdexamfetamine (VYVANSE) 40 MG Oral Cap 30 capsule 0     Sig: Take 1 capsule (40 mg total) by mouth every morning.    lisdexamfetamine (VYVANSE) 40 MG Oral Cap 30 capsule 0     Sig: Take 1 capsule (40 mg total) by mouth every morning.    Tirzepatide (MOUNJARO) 15 MG/0.5ML Subcutaneous Solution Auto-injector 2 mL 0     Sig: Inject 15 mg into the skin once a week.       Imaging & Referrals:  INFLUENZA VACCINE, TRI, PRESERV FREE, 0.5 ML         ID#[id number]         [1] No Known Allergies

## 2024-10-25 ENCOUNTER — PATIENT MESSAGE (OUTPATIENT)
Dept: FAMILY MEDICINE CLINIC | Facility: CLINIC | Age: 24
End: 2024-10-25

## 2024-10-25 ENCOUNTER — LAB ENCOUNTER (OUTPATIENT)
Dept: LAB | Age: 24
End: 2024-10-25
Attending: STUDENT IN AN ORGANIZED HEALTH CARE EDUCATION/TRAINING PROGRAM
Payer: MEDICAID

## 2024-10-25 DIAGNOSIS — N92.6 IRREGULAR MENSES: ICD-10-CM

## 2024-10-25 DIAGNOSIS — E83.52 SERUM CALCIUM ELEVATED: ICD-10-CM

## 2024-10-25 LAB
CORTIS SERPL-MCNC: 12.2 UG/DL
ESTRADIOL SERPL-MCNC: 328.9 PG/ML
FSH SERPL-ACNC: 2.1 MIU/ML
PROGEST SERPL-MCNC: 0.26 NG/ML
PROLACTIN SERPL-MCNC: 12.4 NG/ML
PTH-INTACT SERPL-MCNC: 125.1 PG/ML (ref 18.5–88)

## 2024-10-25 PROCEDURE — 83001 ASSAY OF GONADOTROPIN (FSH): CPT

## 2024-10-25 PROCEDURE — 84410 TESTOSTERONE BIOAVAILABLE: CPT

## 2024-10-25 PROCEDURE — 83520 IMMUNOASSAY QUANT NOS NONAB: CPT

## 2024-10-25 PROCEDURE — 82670 ASSAY OF TOTAL ESTRADIOL: CPT

## 2024-10-25 PROCEDURE — 83970 ASSAY OF PARATHORMONE: CPT

## 2024-10-25 PROCEDURE — 36415 COLL VENOUS BLD VENIPUNCTURE: CPT

## 2024-10-25 PROCEDURE — 82533 TOTAL CORTISOL: CPT

## 2024-10-25 PROCEDURE — 84146 ASSAY OF PROLACTIN: CPT

## 2024-10-25 PROCEDURE — 84144 ASSAY OF PROGESTERONE: CPT

## 2024-10-29 LAB — MULLERIAN AMH: 4.25 NG/ML

## 2024-10-31 LAB
SEX HORM BIND GLOB: 23.7 NMOL/L
TESTOST % FREE+WEAK BND: 27.8 %
TESTOST FREE+WEAK BND: 5.9 NG/DL
TESTOSTERONE TOT /MS: 21.3 NG/DL

## 2024-11-16 DIAGNOSIS — E11.65 TYPE 2 DIABETES MELLITUS WITH HYPERGLYCEMIA, WITHOUT LONG-TERM CURRENT USE OF INSULIN (HCC): ICD-10-CM

## 2024-11-19 ENCOUNTER — TELEPHONE (OUTPATIENT)
Dept: FAMILY MEDICINE CLINIC | Facility: CLINIC | Age: 24
End: 2024-11-19

## 2024-11-19 RX ORDER — TIRZEPATIDE 15 MG/.5ML
15 INJECTION, SOLUTION SUBCUTANEOUS WEEKLY
Qty: 2 ML | Refills: 2 | Status: SHIPPED | OUTPATIENT
Start: 2024-11-19

## 2024-11-19 NOTE — TELEPHONE ENCOUNTER
Refill passed per [facility] protocols.    Requested Prescriptions   Pending Prescriptions Disp Refills    Tirzepatide (MOUNJARO) 15 MG/0.5ML Subcutaneous Solution Auto-injector 2 mL 2     Sig: Inject 15 mg into the skin once a week.       Diabetes Medication Protocol Passed - 11/19/2024 10:18 AM        Passed - Last A1C < 7.5 and within past 6 months     Lab Results   Component Value Date    A1C 7.0 (A) 10/24/2024             Passed - In person appointment or virtual visit in the past 6 mos or appointment in next 3 mos     Recent Outpatient Visits              3 weeks ago Type 2 diabetes mellitus with hyperglycemia, without long-term current use of insulin ([facility])    [facility]    Office Visit    2 months ago Left ear pain    [facility]    Office Visit    7 months ago Influenza-like illness    [facility] [place] Marcelino Hanson Jr., APN    Office Visit    8 months ago Encounter for removal and reinsertion of Nexplanon    [facility]    Office Visit    9 months ago Type 2 diabetes mellitus with hyperglycemia, without long-term current use of insulin ([facility])    [facility]    Nurse Only                      Passed - Microalbumin procedure in past 12 months or taking ACE/ARB        Passed - EGFRCR or GFRNAA > 50     GFR Evaluation  EGFRCR: 126 , resulted on 2/24/2024          Passed - GFR in the past 12 months

## 2024-11-19 NOTE — TELEPHONE ENCOUNTER
Patient is requesting a refill on her Mounjaro 15 MG medication. Patient states that she is out of medication. Patient states that the pharmacy gave her the 12.5 milligrams, but, that is the incorrect dosage. Pharmacy: [facility] (listed)     Current Outpatient Medications   Medication Sig Dispense Refill    Tirzepatide (MOUNJARO) 15 MG/0.5ML Subcutaneous Solution Auto-injector Inject 15 mg into the skin once a week. 2 mL 0

## 2024-12-18 DIAGNOSIS — F50.819 BINGE EATING DISORDER: ICD-10-CM

## 2024-12-24 NOTE — TELEPHONE ENCOUNTER
Please review. Protocol failed/No protocol. Last office visit 10/24/24. Last refill 11/20/24.      Requested Prescriptions   Pending Prescriptions Disp Refills    lisdexamfetamine (VYVANSE) 40 MG Oral Cap 30 capsule 0     Sig: Take 1 capsule (40 mg total) by mouth every morning.       Controlled Substance Medication Failed - 12/24/2024  8:28 AM        Failed - This medication is a controlled substance - forward to provider to refill             Recent Outpatient Visits              2 months ago Type 2 diabetes mellitus with hyperglycemia, without long-term current use of insulin ([facility]    Office Visit    3 months ago Left ear pain    [facility] [place]    Office Visit    8 months ago [facility] illness    [facility] [place] [facility]ino Hanson Jr., APN    Office Visit    10 months ago Encounter for removal and reinsertion of Nexplanon    [facility] [place] [facility]    Office Visit    10 months ago Type 2 diabetes mellitus with hyperglycemia, without long-term current use of insulin (formerly [facility])    [facility] [place]    Nurse Only

## 2024-12-25 RX ORDER — LISDEXAMFETAMINE DIMESYLATE 20 MG/1
20 CAPSULE ORAL EVERY MORNING
Qty: 30 CAPSULE | Refills: 0 | Status: SHIPPED | OUTPATIENT
Start: 2024-12-25

## 2024-12-31 ENCOUNTER — TELEPHONE (OUTPATIENT)
Dept: FAMILY MEDICINE CLINIC | Facility: CLINIC | Age: 24
End: 2024-12-31

## 2024-12-31 NOTE — TELEPHONE ENCOUNTER
Spoke with patient,  verified  Patient was informed of  MD recommendation, she stated understanding.

## 2024-12-31 NOTE — TELEPHONE ENCOUNTER
Pt stated she was exposed to family positive COVID on Saturday ,pt tested yesterday -negative , she's requesting Paxlovid d/t her medical condition-  DM, over weight,asthma ,high blood pressure, advised to recheck in 2 days      Stated she have a cough, she lives family with COVID and taking care of them     Dr Coffman off - send to Pod mate

## 2024-12-31 NOTE — TELEPHONE ENCOUNTER
We would not prescribe paxlovid for just exposure negative COVID testing. Not appropriate for prophylaxis.

## 2025-01-08 DIAGNOSIS — F41.9 ANXIETY DISORDER, UNSPECIFIED: ICD-10-CM

## 2025-01-13 RX ORDER — CLONAZEPAM 0.5 MG/1
0.5 TABLET ORAL 3 TIMES DAILY PRN
Qty: 90 TABLET | Refills: 1 | Status: SHIPPED | OUTPATIENT
Start: 2025-01-13

## 2025-01-13 NOTE — TELEPHONE ENCOUNTER
Please review; protocol failed/No Protocol    Recent Fills: 08/09/2024, 11/18/2024    Last Rx Written: 08/09/2024    Last Office Visit: 10/24/2024    Requested Prescriptions   Pending Prescriptions Disp Refills    CLONAZEPAM 0.5 MG Oral Tab [Pharmacy Med Name: CLONAZEPAM 0.5 MG TABLET] 90 tablet 1     Sig: Take 1 tablet (0.5 mg total) by mouth 3 (three) times daily as needed for Anxiety.       Controlled Substance Medication Failed - 1/13/2025 12:54 PM        Failed - This medication is a controlled substance - forward to provider to refill        Passed - Medication is active on med list             Recent Outpatient Visits              2 months ago Type 2 diabetes mellitus with hyperglycemia, without long-term current use of insulin ([facility]    Office Visit    4 months ago Left ear pain    [facility] [place]    Office Visit    9 months ago Influ[facility]-like illness    [facility] [place] [facility] APN    Office Visit    10 months ago Encounter for removal and reinsertion of Nexplanon    [facility]    Office Visit    10 months ago Type 2 diabetes mellitus with hyperglycemia, without long-term current use of insulin ([facility])    [facility] [place]    Nurse Only

## 2025-01-14 ENCOUNTER — TELEPHONE (OUTPATIENT)
Dept: FAMILY MEDICINE CLINIC | Facility: CLINIC | Age: 25
End: 2025-01-14

## 2025-01-14 DIAGNOSIS — R10.13 DYSPEPSIA: Primary | ICD-10-CM

## 2025-01-14 NOTE — TELEPHONE ENCOUNTER
Patient stated that her PTH was elevated on 10/25/2024 and was referred to endocrinology. Soonest appointment patient was able to get with endocrinology was March 17. Wanted to know if can be seen sooner?     Also patient states that she drinks a lot of water with lemon and tea with lemon. States gets acid reflux- burning sensation in her chest. No chest pain. Patient does take over the counter omeprazole 20mg daily which does help. Patient last had symptoms yesterday and took her omeprazole. Wanted to know if doctor can send a script so it can be covered through her insurance instead of paying out of pocket? Please advise.

## 2025-01-17 NOTE — TELEPHONE ENCOUNTER
Pt was contacted and informed she will be placed on a wait list. Advised if any real concerns that it can be related to PCP and they can connect directly with our clinical staff. Pt verbalized understanding

## 2025-01-21 RX ORDER — NICOTINE POLACRILEX 4 MG/1
1 GUM, CHEWING ORAL DAILY
Qty: 90 TABLET | Refills: 1 | Status: SHIPPED | OUTPATIENT
Start: 2025-01-21 | End: 2025-02-20

## 2025-01-22 NOTE — TELEPHONE ENCOUNTER
Thanks Dr Reese for the referral   Endo team please add to schedule ok lunch hr DG location   Thanks

## 2025-01-28 ENCOUNTER — TELEPHONE (OUTPATIENT)
Dept: FAMILY MEDICINE CLINIC | Facility: CLINIC | Age: 25
End: 2025-01-28

## 2025-01-28 ENCOUNTER — PATIENT MESSAGE (OUTPATIENT)
Facility: LOCATION | Age: 25
End: 2025-01-28

## 2025-01-28 ENCOUNTER — OFFICE VISIT (OUTPATIENT)
Facility: LOCATION | Age: 25
End: 2025-01-28
Payer: MEDICAID

## 2025-01-28 VITALS
WEIGHT: 293 LBS | HEIGHT: 69 IN | BODY MASS INDEX: 43.4 KG/M2 | HEART RATE: 122 BPM | DIASTOLIC BLOOD PRESSURE: 84 MMHG | SYSTOLIC BLOOD PRESSURE: 118 MMHG

## 2025-01-28 DIAGNOSIS — R53.82 CHRONIC FATIGUE: ICD-10-CM

## 2025-01-28 DIAGNOSIS — E11.65 TYPE 2 DIABETES MELLITUS WITH HYPERGLYCEMIA, WITHOUT LONG-TERM CURRENT USE OF INSULIN (HCC): ICD-10-CM

## 2025-01-28 DIAGNOSIS — F19.981 DRUG-INDUCED SEXUAL DYSFUNCTION (HCC): ICD-10-CM

## 2025-01-28 DIAGNOSIS — R74.01 HIGH LIVER TRANSAMINASE LEVEL: ICD-10-CM

## 2025-01-28 DIAGNOSIS — R79.89 LOW VITAMIN D LEVEL: Primary | ICD-10-CM

## 2025-01-28 DIAGNOSIS — E66.01 OBESITY, MORBID, BMI 50 OR HIGHER (HCC): ICD-10-CM

## 2025-01-28 DIAGNOSIS — E21.3 HYPERPARATHYROIDISM (HCC): ICD-10-CM

## 2025-01-28 DIAGNOSIS — E21.3 HYPERPARATHYROIDISM (HCC): Primary | ICD-10-CM

## 2025-01-28 DIAGNOSIS — F90.9 ATTENTION DEFICIT HYPERACTIVITY DISORDER (ADHD), UNSPECIFIED ADHD TYPE: ICD-10-CM

## 2025-01-28 DIAGNOSIS — E83.52 HYPERCALCEMIA: ICD-10-CM

## 2025-01-28 DIAGNOSIS — E11.21 DIABETIC NEPHROPATHY ASSOCIATED WITH TYPE 2 DIABETES MELLITUS (HCC): ICD-10-CM

## 2025-01-28 DIAGNOSIS — K76.0 FATTY LIVER: ICD-10-CM

## 2025-01-28 DIAGNOSIS — E55.9 VITAMIN D DEFICIENCY: ICD-10-CM

## 2025-01-28 DIAGNOSIS — F41.9 ANXIETY DISORDER, UNSPECIFIED TYPE: ICD-10-CM

## 2025-01-28 PROCEDURE — 99245 OFF/OP CONSLTJ NEW/EST HI 55: CPT | Performed by: INTERNAL MEDICINE

## 2025-01-28 NOTE — PATIENT INSTRUCTIONS
24 hr urine test  Fasting AM labs   4D CT   Will refer to Dr Beatty when we get the result if needed   RTC in 3 mo     How to Collect the 24-hour Urine Specimen  Decide on a day to do the test.  On the day of the test, patient empty bladder (urinate or pee) in the toilet right after waking up. Flush the urine down the toilet.  The test begins now with the bladder empty. Write this date and the start time on the storage container’s label.  For the next 24 hours, patient will need to pee into a collection container every time they go to the bathroom. Females can use a toilet hat. Males can use a plastic urinal or pee right into the large storage container. If you do not have a toilet hat or urinal at home, you may use some other clean plastic container- or get them from labs   Before using the plastic container for the first time, wash it with dish soap and then rinse at least 10 times with tap water. Allow it to air dry completely.  Do not let feces (poop) mix with the urine or else the test will need to be restarted.  Pour the urine into the large storage container and close the lid tightly. Be very careful not to spill any urine.  If using a collection container, rinse it with water only. Put it back by the toilet to remind you to use it the next time. Allow it to air dry completely.  Put the large storage container in the refrigerator ( or in put ice around the container and place in larger container). The urine must be kept cool at all times. If you do not have space in the refrigerator, you can store it in a cooler on top of Add more ice as needed to keep the urine cold.  Each time patient pees during the day and night, follow steps to collect urine.  The next day (close to the same time that you started on the first day), patient needs to pee into the collection container one last time. Add it to the large storage container. This ends the 24-hour collection.  Write the date and time of this last urine  collection on the label.  Attach a list of all medicines, including over-the-counter medicines, vitamins and herbal remedies, patient took during the 24-hour urine collection.    Take the urine to a Laboratory (Lab) Service Center as soon as possible, within 24 hours after ending the collection. Keep the urine cool.  Make sure the urine does not freeze for these tests: amylase, arylsulfatase, immunoelectrophoresis, micro-albumin, pregnanetriol, protein or uric acid.  You will need to start the test over if any of the urine spilled, you forgot to save some or it has poop in it. If you must restart the test, it is okay to use the same collection and storage containers. Pour out the urine, clean the containers well and allow them to air dry. Then follow

## 2025-01-28 NOTE — PROGRESS NOTES
New Patient Evaluation - History and Physical    CONSULT - Reason for Visit:     PTH-mediated hypercalcemia   Requesting Physician:   ..Shahrzad Coffman MD  No referring provider defined for this encounter.    CHIEF COMPLAINT:  No chief complaint on file.       HISTORY OF PRESENT ILLNESS:   Joaquina Ferrari is a 24 year old female who presents with  PTH-mediated hypercalcemia , DM, nephropathy, obese and low vit D  Labs showed high ca, high PTH, normal eGFR , alb 4.7, vit D 23    Has GERD sx , nausea and vomiting   She is on Mounjaro so some of the sx can be d/t meds   She is under stress - helping father and sister with Down syndrome. Mother passed away last year    Denied Polyuria, Polydipsia, Nephrolithiasis, nephrocalcinosis   Denied Distal renal tubular acidosis, Acute and chronic renal insufficiency       DM  was dx ~ 2022   On VGW185 mg daily    Mounjaro 12.5 mg qwk    LDP on statin   GERD on PPI        DM quality measures:  A1C/Blood pressure: as reported above     Last dilated eye exam: No data recorded   Exam shows retinopathy? No data recorded  Last diabetic foot exam: No data recorded  Dentist : recommend every 6m  Neuropathy: no  CAD/ASCVD/PAD/CVA:no    GLP-1 agonists:  No personal or family history of MEN syndrome   No personal history pancreatitis   Patient counselled regarding side effects including injection site reactions, nausea, vomiting, diarrhea, pancreatitis, gastroparesis and rare side effect juan Ángel syndrome.    SGLT2 inhibitors  No UTI or yeast infection   Discussed side effects including UTI and fungal infections.   Discussed the importance of hydration.    No biotin , tumeric or marijuana use     ASSESSMENT AND PLAN:  Joaquina Ferrari is a 24 year old female who presents with    PTH-mediated hypercalcemia , DM, nephropathy, obese,   and low vit D    plan  24 hr urine test  Fasting AM labs   4D CT   Will refer to Dr Beatty when we get the result if needed   RTC in 3 mo      How to Collect the 24-hour Urine Specimen  Decide on a day to do the test.  On the day of the test, patient empty bladder (urinate or pee) in the toilet right after waking up. Flush the urine down the toilet.  The test begins now with the bladder empty. Write this date and the start time on the storage container’s label.  For the next 24 hours, patient will need to pee into a collection container every time they go to the bathroom. Females can use a toilet hat. Males can use a plastic urinal or pee right into the large storage container. If you do not have a toilet hat or urinal at home, you may use some other clean plastic container- or get them from labs   Before using the plastic container for the first time, wash it with dish soap and then rinse at least 10 times with tap water. Allow it to air dry completely.  Do not let feces (poop) mix with the urine or else the test will need to be restarted.  Pour the urine into the large storage container and close the lid tightly. Be very careful not to spill any urine.  If using a collection container, rinse it with water only. Put it back by the toilet to remind you to use it the next time. Allow it to air dry completely.  Put the large storage container in the refrigerator ( or in put ice around the container and place in larger container). The urine must be kept cool at all times. If you do not have space in the refrigerator, you can store it in a cooler on top of Add more ice as needed to keep the urine cold.  Each time patient pees during the day and night, follow steps to collect urine.  The next day (close to the same time that you started on the first day), patient needs to pee into the collection container one last time. Add it to the large storage container. This ends the 24-hour collection.  Write the date and time of this last urine collection on the label.  Attach a list of all medicines, including over-the-counter medicines, vitamins and herbal  remedies, patient took during the 24-hour urine collection.    Take the urine to a Laboratory (Lab) Service Center as soon as possible, within 24 hours after ending the collection. Keep the urine cool.  Make sure the urine does not freeze for these tests: amylase, arylsulfatase, immunoelectrophoresis, micro-albumin, pregnanetriol, protein or uric acid.  You will need to start the test over if any of the urine spilled, you forgot to save some or it has poop in it. If you must restart the test, it is okay to use the same collection and storage containers. Pour out the urine, clean the containers well and allow them to air dry. Then follow           PAST MEDICAL HISTORY:   Past Medical History:    ADD (attention deficit disorder)    8/13 AdderallXR 30  REFILL 3/23/12 - Adderall 20 mg XR  10/13/12 - Adderal 20 XR and 10 mg short acting at lunch time    Anxiety    Anxiety state    Asthma    Pulmacort - albuterol prn    Attention deficit disorder    Attention deficit hyperactivity disorder (ADHD)    Binge eating disorder    Diabetes (HCC)    Fatty liver    Hypercholesteremia    Irritability and anger    Low vitamin D level    Migraines    Morbid obesity with BMI of 50.0-59.9, adult (HCC)    Multiple body piercings    Recent bereavement    Type 2 diabetes mellitus with hyperglycemia, without long-term current use of insulin (HCC)    Visual impairment    eyeglasses       PAST SURGICAL HISTORY:   Past Surgical History:   Procedure Laterality Date    Tonsillectomy  11/09/2018       CURRENT MEDICATIONS:     Omeprazole 20 MG Oral Tab EC Take 1 tablet by mouth daily. 90 tablet 1    clonazePAM 0.5 MG Oral Tab Take 1 tablet (0.5 mg total) by mouth 3 (three) times daily as needed for Anxiety. 90 tablet 1    lisdexamfetamine (VYVANSE) 20 MG Oral Cap Take 1 capsule (20 mg total) by mouth every morning. 30 capsule 0    atorvastatin 20 MG Oral Tab Take 1 tablet (20 mg total) by mouth nightly. 90 tablet 3    metFORMIN  MG Oral  Tablet 24 Hr Take 1 tablet (500 mg total) by mouth daily with breakfast. 90 tablet 3    ergocalciferol 1.25 MG (16191 UT) Oral Cap Take 1 capsule (50,000 Units total) by mouth once a week. 24 capsule 0    busPIRone 15 MG Oral Tab Take 1 tablet (15 mg total) by mouth every evening. 90 tablet 3    clonazePAM 0.25 MG Oral Tablet Dispersible Take 1 tablet (0.25 mg total) by mouth daily as needed. 30 tablet 1    albuterol (2.5 MG/3ML) 0.083% Inhalation Nebu Soln Take 3 mL (2.5 mg total) by nebulization every 4 (four) hours as needed for Wheezing. 180 mL 1       ALLERGIES:  Allergies[1]    SOCIAL HISTORY:    Social History     Socioeconomic History    Marital status: Single   Tobacco Use    Smoking status: Never    Smokeless tobacco: Never   Substance and Sexual Activity    Alcohol use: Yes     Alcohol/week: 0.0 standard drinks of alcohol     Comment: OCC    Drug use: No    Sexual activity: Yes     Partners: Male     Birth control/protection: Condom   Other Topics Concern    Caffeine Concern Yes     Comment: soda      Work - nanny   Smoking  vaping   Marijuana no     FAMILY HISTORY:   Family History   Problem Relation Age of Onset    Hypertension Father     Lipids Father     Cancer Mother         T-cell lymphoma    Asthma Mother     Diabetes Mother     Lipids Maternal Grandmother     Colon Cancer Maternal Grandfather     Diabetes Paternal Grandmother     Hypertension Paternal Grandmother     Cancer Paternal Grandmother         lung cancer    Chromosomal Disease Sister         down syndrome        REVIEW OF SYSTEMS:  All negative other than HPI    PHYSICAL EXAM:   Height: 5' 9\" (175.3 cm) (01/28 1155)  Weight: 307 lb (139.3 kg) (01/28 1155)  BSA (Calculated - sq m): 2.48 sq meters (01/28 1155)  Pulse: 122 (01/28 1155)  BP: 118/84 (01/28 1155)  Temp: --  Do Not Use - Resp Rate: --  SpO2: --    Body mass index is 45.34 kg/m².  Wt Readings from Last 6 Encounters:   01/28/25 (!) 307 lb (139.3 kg)   10/24/24 (!) 317 lb (143.8  kg)   09/03/24 235 lb (106.6 kg)   04/13/24 (!) 322 lb (146.1 kg)   02/15/24 (!) 331 lb 9.6 oz (150.4 kg)   02/07/24 (!) 328 lb (148.8 kg)      CONSTITUTIONAL:  Awake and alert. Age appropriate, good hygiene not in acute distress. Well-nourished and well developed. no acute distress   PSYCH:    Normal mood and affect,   cooperative  Neuro: speech is clear. Awake, alert, no aphasia, no facial asymmetry,    EYES:  No proptosis, no ptosis, conjunctiva normal  ENT:  Normocephalic, atraumatic  Eye: EOMI, normal lids, no discharge,    No rhinorrhea, moist oral mucosa  Neck: full range of motion  Neck/Thyroid: neck inspection: normal, No scar, No goiter   LUNGS:  No acute respiratory distress, non-labored respiration. Speaking full sentences  CARDIOVASCULAR:  regular rate   ABDOMEN:  No abdominal pain.   SKIN:  Skin is dry, no obvious rashes or lesions  EXTREMITIES: no gross abnormality   MSK: Moves extremities spontaneously. full range of motion in all major joints    DATA:     Pertinent data reviewed   No results found.    Latest Reference Range & Units 02/24/24 08:45   CALCIUM 8.7 - 10.4 mg/dL 10.7 (H)   BUN/CREATININE RATIO 10.0 - 20.0  14.5   EGFR >=60 mL/min/1.73m2 126   (H): Data is abnormally high   Latest Reference Range & Units 02/24/24 08:45 10/25/24 07:48   VITAMIN D, 25-OH, TOTAL 30.0 - 100.0 ng/mL 23.0 (L)                 TSH 0.550 - 4.780 mIU/mL 1.624    PTH INTACT 18.5 - 88.0 pg/mL  125.1 (H)      Latest Reference Range & Units 02/24/24 08:45   Albumin 3.2 - 4.8 g/dL 4.7     Kidney:    Lab Results   Component Value Date    BUN 9 02/24/2024    BUN 8 06/05/2023    BUN 7 11/16/2021     Lab Results   Component Value Date    CREATSERUM 0.62 02/24/2024    CREATSERUM 0.66 06/05/2023    CREATSERUM 0.69 11/16/2021      BMP:   No results found for: \"GLUCOSE\"  Lab Results   Component Value Date    K 4.4 02/24/2024    K 4.0 06/05/2023    K 4.1 11/16/2021     Lab Results   Component Value Date    BUN 9 02/24/2024    BUN  8 06/05/2023    BUN 7 11/16/2021     Lab Results   Component Value Date    CREATSERUM 0.62 02/24/2024    CREATSERUM 0.66 06/05/2023    CREATSERUM 0.69 11/16/2021         No results for input(s): \"TSH\", \"T4F\", \"T3F\", \"THYP\" in the last 72 hours.  No results found.    Orders Placed This Encounter   Procedures    Calcium, 24Hr Urine    Creatinine, 24-Hour Urine    PTH, Intact    Comp Metabolic Panel (14)    Magnesium    Phosphorus    Vitamin D [E]     Orders Placed This Encounter    Calcium, 24Hr Urine     Standing Status:   Future     Standing Expiration Date:   1/28/2026     Order Specific Question:   Release to patient     Answer:   Immediate    Creatinine, 24-Hour Urine     Standing Status:   Future     Standing Expiration Date:   1/28/2026     Order Specific Question:   Release to patient     Answer:   Immediate    PTH, Intact     Standing Status:   Future     Standing Expiration Date:   1/28/2026    Comp Metabolic Panel (14)     Standing Status:   Future     Standing Expiration Date:   1/28/2026     Order Specific Question:   Release to patient     Answer:   Immediate    Magnesium     Standing Status:   Future     Standing Expiration Date:   1/28/2026     Order Specific Question:   Release to patient     Answer:   Immediate    Phosphorus     Standing Status:   Future     Standing Expiration Date:   1/28/2026     Order Specific Question:   Release to patient     Answer:   Immediate    Vitamin D [E]     Standing Status:   Future     Standing Expiration Date:   1/28/2026     Order Specific Question:   Please pick the scenario that best fits the purpose for ordering this test     Answer:   General Screening/Vit D deficiency (25-Hydroxy)     Order Specific Question:   Release to patient     Answer:   Immediate          This is a specialized patient consultation in endocrinology and required comprehensive review of prior records, as well as current evaluation, with time required for consideration of complex endocrine  issues and consultation. For this visit, I personally interviewed the patient, and family member if accompanied, performed the pertinent parts of the history and physical examination. ROS included screening for appropriate endocrine conditions.   Today's diagnosis and plan were reviewed in detail with the patient who states understanding and agrees with plan. I discussed with the patient possible diagnosis, differential diagnosis, need for work up, treatment options, alternatives and side effects.     Please see note for details about time spent which includes:   · pre-visit preparation  · reviewing records  · face to face time with the patient   · timely documentation of the encounter  · ordering medications/tests  · communication with care team  · care coordination    I appreciate the opportunity to be part of your patient's medical care and will keep you, as the referring and primary physicians, informed about the care of your patient. Please feel free to contact me should you have any questions.    The 21st Century Cures Act makes medical notes like these available to patients in the interest of transparency. Please be advised this is a medical document. Medical documents are intended to carry relevant information, facts as evident, and the clinical opinion of the practitioner. The medical note is intended as peer to peer communication and may appear blunt or direct. It is written in medical language and may contain abbreviations or verbiage that are unfamiliar.   Royal Herrera MD             [1] No Known Allergies

## 2025-01-28 NOTE — TELEPHONE ENCOUNTER
Patient saw Dr Herrera (endocrinologist ) today and was advised to see a surgeon for her parathyroid and do the CT  soft tissue.   She was able to schedule an appointment with the surgeon on 2/3/25 and CT on 2/6/25.     She wants to know if this is ok or if she needs to do the CT first .   Instructed to contact Dr Herrera's office at [phone] and clarify with them.  Call transferred per request .         Referred to Provider Information:  Provider Address Phone   Royal Herrera MD [facility] [address]  [facility] [id number] [phone]     Future Appointments   Date Time Provider Department Center   2/3/2025  3:30 PM Aristides Watts, DO [facility]   2/6/2025  5:30 PM [facility] CT RM2 [facility] CT SCAN EM [facility]   5/2/2025  3:45 PM Royal Herrera MD [facility]

## 2025-01-29 RX ORDER — BUSPIRONE HYDROCHLORIDE 15 MG/1
15 TABLET ORAL EVERY EVENING
Qty: 90 TABLET | Refills: 3 | Status: SHIPPED | OUTPATIENT
Start: 2025-01-29

## 2025-01-29 RX ORDER — LISDEXAMFETAMINE DIMESYLATE 40 MG/1
40 CAPSULE ORAL EVERY MORNING
Qty: 30 CAPSULE | Refills: 0 | Status: SHIPPED | OUTPATIENT
Start: 2025-01-29

## 2025-01-29 RX ORDER — ATORVASTATIN CALCIUM 20 MG/1
20 TABLET, FILM COATED ORAL NIGHTLY
Qty: 90 TABLET | Refills: 3 | Status: SHIPPED | OUTPATIENT
Start: 2025-01-29

## 2025-01-29 RX ORDER — ERGOCALCIFEROL 1.25 MG/1
50000 CAPSULE, LIQUID FILLED ORAL WEEKLY
Qty: 24 CAPSULE | Refills: 0 | Status: SHIPPED | OUTPATIENT
Start: 2025-01-29

## 2025-01-29 RX ORDER — TIRZEPATIDE 15 MG/.5ML
15 INJECTION, SOLUTION SUBCUTANEOUS WEEKLY
Qty: 6 ML | Refills: 2 | Status: SHIPPED | OUTPATIENT
Start: 2025-01-29

## 2025-01-30 ENCOUNTER — LAB ENCOUNTER (OUTPATIENT)
Dept: LAB | Age: 25
End: 2025-01-30
Attending: INTERNAL MEDICINE
Payer: MEDICAID

## 2025-01-30 DIAGNOSIS — E21.3 HYPERPARATHYROIDISM (HCC): ICD-10-CM

## 2025-01-30 DIAGNOSIS — E55.9 VITAMIN D DEFICIENCY: ICD-10-CM

## 2025-01-30 DIAGNOSIS — R79.89 LOW VITAMIN D LEVEL: ICD-10-CM

## 2025-01-30 DIAGNOSIS — F90.9 ATTENTION DEFICIT HYPERACTIVITY DISORDER (ADHD), UNSPECIFIED ADHD TYPE: ICD-10-CM

## 2025-01-30 DIAGNOSIS — R53.82 CHRONIC FATIGUE: ICD-10-CM

## 2025-01-30 DIAGNOSIS — K76.0 FATTY LIVER: ICD-10-CM

## 2025-01-30 DIAGNOSIS — R74.01 HIGH LIVER TRANSAMINASE LEVEL: ICD-10-CM

## 2025-01-30 DIAGNOSIS — E11.21 DIABETIC NEPHROPATHY ASSOCIATED WITH TYPE 2 DIABETES MELLITUS (HCC): ICD-10-CM

## 2025-01-30 DIAGNOSIS — E83.52 HYPERCALCEMIA: ICD-10-CM

## 2025-01-30 DIAGNOSIS — E66.01 OBESITY, MORBID, BMI 50 OR HIGHER (HCC): ICD-10-CM

## 2025-01-30 DIAGNOSIS — E11.65 TYPE 2 DIABETES MELLITUS WITH HYPERGLYCEMIA, WITHOUT LONG-TERM CURRENT USE OF INSULIN (HCC): ICD-10-CM

## 2025-01-30 LAB
ALBUMIN SERPL-MCNC: 5.3 G/DL (ref 3.2–4.8)
ALBUMIN/GLOB SERPL: 2 {RATIO} (ref 1–2)
ALP LIVER SERPL-CCNC: 92 U/L
ALT SERPL-CCNC: 68 U/L
ANION GAP SERPL CALC-SCNC: 12 MMOL/L (ref 0–18)
AST SERPL-CCNC: 30 U/L (ref ?–34)
BILIRUB SERPL-MCNC: 0.5 MG/DL (ref 0.3–1.2)
BUN BLD-MCNC: 9 MG/DL (ref 9–23)
BUN/CREAT SERPL: 13.2 (ref 10–20)
CALCIUM 24H UR-SRATE: 429 MG/24HR (ref 100–300)
CALCIUM BLD-MCNC: 12.2 MG/DL (ref 8.7–10.4)
CHLORIDE SERPL-SCNC: 108 MMOL/L (ref 98–112)
CO2 SERPL-SCNC: 20 MMOL/L (ref 21–32)
CREAT BLD-MCNC: 0.68 MG/DL
CREAT UR-SCNC: 1.87 G/24 HR (ref 0.6–1.8)
CREAT UR-SCNC: 172.7 MG/DL
EGFRCR SERPLBLD CKD-EPI 2021: 123 ML/MIN/1.73M2 (ref 60–?)
FASTING STATUS PATIENT QL REPORTED: YES
GLOBULIN PLAS-MCNC: 2.6 G/DL (ref 2–3.5)
GLUCOSE BLD-MCNC: 110 MG/DL (ref 70–99)
MAGNESIUM SERPL-MCNC: 2 MG/DL (ref 1.6–2.6)
MICROALBUMIN UR-MCNC: 1 MG/DL
MICROALBUMIN/CREAT 24H UR-RTO: 5.8 UG/MG (ref ?–30)
OSMOLALITY SERPL CALC.SUM OF ELEC: 289 MOSM/KG (ref 275–295)
PHOSPHATE SERPL-MCNC: 2.4 MG/DL (ref 2.4–5.1)
POTASSIUM SERPL-SCNC: 4.2 MMOL/L (ref 3.5–5.1)
PROT SERPL-MCNC: 7.9 G/DL (ref 5.7–8.2)
PTH-INTACT SERPL-MCNC: 124.8 PG/ML (ref 18.5–88)
SODIUM SERPL-SCNC: 140 MMOL/L (ref 136–145)
SPECIMEN VOL UR: 1100 ML
SPECIMEN VOL UR: 1100 ML
VIT D+METAB SERPL-MCNC: 53 NG/ML (ref 30–100)

## 2025-01-30 PROCEDURE — 82043 UR ALBUMIN QUANTITATIVE: CPT

## 2025-01-30 PROCEDURE — 82306 VITAMIN D 25 HYDROXY: CPT

## 2025-01-30 PROCEDURE — 82340 ASSAY OF CALCIUM IN URINE: CPT

## 2025-01-30 PROCEDURE — 36415 COLL VENOUS BLD VENIPUNCTURE: CPT

## 2025-01-30 PROCEDURE — 83735 ASSAY OF MAGNESIUM: CPT

## 2025-01-30 PROCEDURE — 84100 ASSAY OF PHOSPHORUS: CPT

## 2025-01-30 PROCEDURE — 83970 ASSAY OF PARATHORMONE: CPT

## 2025-01-30 PROCEDURE — 82570 ASSAY OF URINE CREATININE: CPT

## 2025-01-30 PROCEDURE — 80053 COMPREHEN METABOLIC PANEL: CPT

## 2025-01-31 ENCOUNTER — PATIENT MESSAGE (OUTPATIENT)
Dept: FAMILY MEDICINE CLINIC | Facility: CLINIC | Age: 25
End: 2025-01-31

## 2025-02-07 ENCOUNTER — NURSE TRIAGE (OUTPATIENT)
Dept: FAMILY MEDICINE CLINIC | Facility: CLINIC | Age: 25
End: 2025-02-07

## 2025-02-07 DIAGNOSIS — N89.8 VAGINAL IRRITATION: Primary | ICD-10-CM

## 2025-02-08 ENCOUNTER — HOSPITAL ENCOUNTER (OUTPATIENT)
Dept: CT IMAGING | Facility: HOSPITAL | Age: 25
Discharge: HOME | End: 2025-02-08
Attending: INTERNAL MEDICINE
Payer: MEDICAID

## 2025-02-08 DIAGNOSIS — E11.65 TYPE 2 DIABETES MELLITUS WITH HYPERGLYCEMIA, WITHOUT LONG-TERM CURRENT USE OF INSULIN (HCC): ICD-10-CM

## 2025-02-08 DIAGNOSIS — E11.21 DIABETIC NEPHROPATHY ASSOCIATED WITH TYPE 2 DIABETES MELLITUS (HCC): ICD-10-CM

## 2025-02-08 DIAGNOSIS — E83.52 HYPERCALCEMIA: ICD-10-CM

## 2025-02-08 DIAGNOSIS — K76.0 FATTY LIVER: ICD-10-CM

## 2025-02-08 DIAGNOSIS — E55.9 VITAMIN D DEFICIENCY: ICD-10-CM

## 2025-02-08 DIAGNOSIS — R53.82 CHRONIC FATIGUE: ICD-10-CM

## 2025-02-08 DIAGNOSIS — F90.9 ATTENTION DEFICIT HYPERACTIVITY DISORDER (ADHD), UNSPECIFIED ADHD TYPE: ICD-10-CM

## 2025-02-08 DIAGNOSIS — E66.01 OBESITY, MORBID, BMI 50 OR HIGHER (HCC): ICD-10-CM

## 2025-02-08 DIAGNOSIS — R74.01 HIGH LIVER TRANSAMINASE LEVEL: ICD-10-CM

## 2025-02-08 DIAGNOSIS — E21.3 HYPERPARATHYROIDISM (HCC): ICD-10-CM

## 2025-02-08 DIAGNOSIS — R79.89 LOW VITAMIN D LEVEL: ICD-10-CM

## 2025-02-08 PROCEDURE — 70492 CT SFT TSUE NCK W/O & W/DYE: CPT | Performed by: INTERNAL MEDICINE

## 2025-02-10 ENCOUNTER — PATIENT MESSAGE (OUTPATIENT)
Facility: LOCATION | Age: 25
End: 2025-02-10

## 2025-02-10 DIAGNOSIS — E21.3 HYPERPARATHYROIDISM (HCC): Primary | ICD-10-CM

## 2025-02-11 ENCOUNTER — TELEPHONE (OUTPATIENT)
Dept: FAMILY MEDICINE CLINIC | Facility: CLINIC | Age: 25
End: 2025-02-11

## 2025-02-11 DIAGNOSIS — E11.65 TYPE 2 DIABETES MELLITUS WITH HYPERGLYCEMIA, WITHOUT LONG-TERM CURRENT USE OF INSULIN (HCC): Primary | ICD-10-CM

## 2025-02-11 RX ORDER — FLUCONAZOLE 150 MG/1
150 TABLET ORAL ONCE
Qty: 1 TABLET | Refills: 0 | OUTPATIENT
Start: 2025-02-11 | End: 2025-02-11

## 2025-02-11 NOTE — TELEPHONE ENCOUNTER
Received letter from [facility]- will need prior auth for further refills, patient given short term supply 2/3. Please advise.    Tirzepatide (MOUNJARO) 15 MG/0.5ML Subcutaneous Solution Auto-injector 6 mL 2 1/29/2025

## 2025-02-11 NOTE — TELEPHONE ENCOUNTER
Please review .protocol failed/ has no protocol    Last Office Visit: 10/24/2024  Please advise if refill is appropriate.   Requested Prescriptions     Pending Prescriptions Disp Refills    FLUCONAZOLE 150 MG Oral Tab [Pharmacy Med Name: FLUCONAZOLE 150 MG TABLET] 1 tablet 0     Sig: Take 1 tablet (150 mg total) by mouth once for 1 dose.

## 2025-02-12 RX ORDER — TIRZEPATIDE 15 MG/.5ML
15 INJECTION, SOLUTION SUBCUTANEOUS WEEKLY
Qty: 6 ML | Refills: 2 | Status: SHIPPED | OUTPATIENT
Start: 2025-02-12

## 2025-02-12 RX ORDER — FLUCONAZOLE 150 MG/1
150 TABLET ORAL ONCE
Qty: 2 TABLET | Refills: 0 | Status: SHIPPED | OUTPATIENT
Start: 2025-02-12 | End: 2025-02-12

## 2025-02-12 NOTE — TELEPHONE ENCOUNTER
Action Requested: Summary for Provider     []  Critical Lab, Recommendations Needed  [x] Need Additional Advice  []   FYI    []   Need Orders  [] Need Medications Sent to Pharmacy  []  Other     SUMMARY: Dr Coffman --- Okay to add to schedule tomorrow, 2/13/25?   At 9:45am, 12:30pm, Or something before 2:45pm tomorrow?   Only wants to see Dr Augustus Valdez over-the-counter and a previously prescribed Monistat  With history of diabetes.     Reason for call: Refill Request --- Fluconazole   Onset: for the past week       RN called patient to triage.   Patient's date of birth and full name both confirmed.       Tried monistat over-the-counter and Prescription - getting better but still not resolving.     Still has white discharge.   Sensitive when wiping.     No urinary symptoms.    No odor  No bleeding.     Triaged and advised care advice       Evaluation advised , offered openings today.   Declines today  Asking for Tomorrow  and only to see Dr Coffman.     RN will inquire  Patient verbalizes understanding and is agreeable to instructions.       Reason for Disposition   Symptoms of a yeast infection' (i.e., itchy, white discharge, not bad smelling) and not improved > 3 days following Care Advice    Protocols used: Vaginal Mivbtuufk-A-GT

## 2025-02-12 NOTE — TELEPHONE ENCOUNTER
Denied    Note from payer: Details of this decision are provided on the physician outcome notice which has been faxed to the number on file.  Payer: [employer] [facility] Case ID: [id number]    [phone]  Electronic appeal: Not supported  View History

## 2025-02-13 NOTE — TELEPHONE ENCOUNTER
Please call with result   CT showed Given  possible  a parathyroid adenoma     Will refer to Dr Beatty   Thanks

## 2025-02-13 NOTE — TELEPHONE ENCOUNTER
JUDITH Burns    Called pt and provided below result. Patient verbalized understanding but stated that she will see Dr. Kash Pandey for surgery, has appt 2/17.

## 2025-03-18 DIAGNOSIS — E66.01 OBESITY, MORBID, BMI 50 OR HIGHER (HCC): ICD-10-CM

## 2025-03-21 LAB — AMB EXT HGBA1C: 5.7 %

## 2025-03-21 RX ORDER — LISDEXAMFETAMINE DIMESYLATE 50 MG/1
50 CAPSULE ORAL EVERY MORNING
Qty: 30 CAPSULE | Refills: 0 | Status: SHIPPED | OUTPATIENT
Start: 2025-03-21

## 2025-03-21 NOTE — TELEPHONE ENCOUNTER
Spoke with patient.  She is requesting a dose increase on vyvanse to help with appetite suppression.  Currently on 40 mg.  Dr. Coffman please advise.

## 2025-03-21 NOTE — TELEPHONE ENCOUNTER
Dear Nursing staff.     Please call patient to clarify increase of Lisdexamfetamine 40 mg:    Patients MyChart message:        lisdexamfetamine 40 MG Oral Cap [Shahrzad Coffman]      Patient Comment: Hi Dr Coffman, is there anyway we can bump this up to the next dose? I had to stop taking my Mounjaro for this week and next week because on Tuesday the 25th I am having a parathyroidectomy (Dr Byrne from [place] is my surgeon) and per the pre-op instructions they wanted last week (Monday the 10th) to be the last time I did the injection before surgery. I’m scared that I am going to stsrt gaining the weight back. Last week when I did my weigh in I was 288.4lbs, heaviest I weighed was 345lbs.    Thank You ,  Ella Sheldon

## 2025-04-07 ENCOUNTER — MED REC SCAN ONLY (OUTPATIENT)
Dept: FAMILY MEDICINE CLINIC | Facility: CLINIC | Age: 25
End: 2025-04-07

## 2025-04-08 DIAGNOSIS — F19.981 DRUG-INDUCED SEXUAL DYSFUNCTION (HCC): ICD-10-CM

## 2025-04-08 DIAGNOSIS — F41.9 ANXIETY DISORDER, UNSPECIFIED TYPE: ICD-10-CM

## 2025-04-08 DIAGNOSIS — E11.65 TYPE 2 DIABETES MELLITUS WITH HYPERGLYCEMIA, WITHOUT LONG-TERM CURRENT USE OF INSULIN (HCC): ICD-10-CM

## 2025-04-08 DIAGNOSIS — E55.9 VITAMIN D DEFICIENCY: ICD-10-CM

## 2025-04-11 RX ORDER — ATORVASTATIN CALCIUM 20 MG/1
20 TABLET, FILM COATED ORAL NIGHTLY
Qty: 90 TABLET | Refills: 2 | Status: SHIPPED | OUTPATIENT
Start: 2025-04-11

## 2025-04-11 RX ORDER — ATORVASTATIN CALCIUM 20 MG/1
20 TABLET, FILM COATED ORAL NIGHTLY
Qty: 90 TABLET | Refills: 0 | OUTPATIENT
Start: 2025-04-11

## 2025-04-11 NOTE — TELEPHONE ENCOUNTER
atorvastatin 20 MG Oral Tab 90 tablet 3 1/29/2025 --    Sig - Route: Take 1 tablet (20 mg total) by mouth nightly. - Oral    Sent to pharmacy as: Atorvastatin Calcium 20 MG Oral Tablet (Lipitor)    E-Prescribing Status: Receipt confirmed by pharmacy (1/29/2025  9:17 AM CST)      Associated Diagnoses    Type 2 diabetes mellitus with hyperglycemia, without long-term current use of insulin ([facility])        Pharmacy    [id number] - [place], [place] - [address] AT [facility], [phone], [phone]

## 2025-04-22 ENCOUNTER — NURSE TRIAGE (OUTPATIENT)
Dept: FAMILY MEDICINE CLINIC | Facility: CLINIC | Age: 25
End: 2025-04-22

## 2025-04-22 ENCOUNTER — APPOINTMENT (OUTPATIENT)
Dept: GENERAL RADIOLOGY | Facility: HOSPITAL | Age: 25
End: 2025-04-22
Attending: EMERGENCY MEDICINE
Payer: MEDICAID

## 2025-04-22 ENCOUNTER — HOSPITAL ENCOUNTER (EMERGENCY)
Facility: HOSPITAL | Age: 25
Discharge: HOME | End: 2025-04-22
Attending: EMERGENCY MEDICINE
Payer: MEDICAID

## 2025-04-22 VITALS
RESPIRATION RATE: 16 BRPM | DIASTOLIC BLOOD PRESSURE: 85 MMHG | HEART RATE: 96 BPM | TEMPERATURE: 98 F | BODY MASS INDEX: 42.89 KG/M2 | HEIGHT: 68 IN | SYSTOLIC BLOOD PRESSURE: 141 MMHG | OXYGEN SATURATION: 99 % | WEIGHT: 283 LBS

## 2025-04-22 DIAGNOSIS — T78.40XA ALLERGIC REACTION, INITIAL ENCOUNTER: ICD-10-CM

## 2025-04-22 DIAGNOSIS — R07.89 CHEST PAIN, NON-CARDIAC: Primary | ICD-10-CM

## 2025-04-22 LAB
ANION GAP SERPL CALC-SCNC: 10 MMOL/L (ref 0–18)
BASOPHILS # BLD AUTO: 0.07 X10(3) UL (ref 0–0.2)
BASOPHILS NFR BLD AUTO: 0.5 %
BUN BLD-MCNC: 10 MG/DL (ref 9–23)
BUN/CREAT SERPL: 14.5 (ref 10–20)
CALCIUM BLD-MCNC: 9.8 MG/DL (ref 8.7–10.4)
CHLORIDE SERPL-SCNC: 104 MMOL/L (ref 98–112)
CO2 SERPL-SCNC: 25 MMOL/L (ref 21–32)
CREAT BLD-MCNC: 0.69 MG/DL (ref 0.55–1.02)
DEPRECATED RDW RBC AUTO: 39 FL (ref 35.1–46.3)
EGFRCR SERPLBLD CKD-EPI 2021: 122 ML/MIN/1.73M2 (ref 60–?)
EOSINOPHIL # BLD AUTO: 0.14 X10(3) UL (ref 0–0.7)
EOSINOPHIL NFR BLD AUTO: 1 %
ERYTHROCYTE [DISTWIDTH] IN BLOOD BY AUTOMATED COUNT: 12.8 % (ref 11–15)
GLUCOSE BLD-MCNC: 95 MG/DL (ref 70–99)
HCT VFR BLD AUTO: 42.1 % (ref 35–48)
HGB BLD-MCNC: 14.1 G/DL (ref 12–16)
IMM GRANULOCYTES # BLD AUTO: 0.04 X10(3) UL (ref 0–1)
IMM GRANULOCYTES NFR BLD: 0.3 %
LYMPHOCYTES # BLD AUTO: 3.5 X10(3) UL (ref 1–4)
LYMPHOCYTES NFR BLD AUTO: 25.6 %
MCH RBC QN AUTO: 28.5 PG (ref 26–34)
MCHC RBC AUTO-ENTMCNC: 33.5 G/DL (ref 31–37)
MCV RBC AUTO: 85.1 FL (ref 80–100)
MONOCYTES # BLD AUTO: 0.92 X10(3) UL (ref 0.1–1)
MONOCYTES NFR BLD AUTO: 6.7 %
NEUTROPHILS # BLD AUTO: 8.98 X10 (3) UL (ref 1.5–7.7)
NEUTROPHILS # BLD AUTO: 8.98 X10(3) UL (ref 1.5–7.7)
NEUTROPHILS NFR BLD AUTO: 65.9 %
OSMOLALITY SERPL CALC.SUM OF ELEC: 287 MOSM/KG (ref 275–295)
PLATELET # BLD AUTO: 383 10(3)UL (ref 150–450)
POTASSIUM SERPL-SCNC: 4.6 MMOL/L (ref 3.5–5.1)
RBC # BLD AUTO: 4.95 X10(6)UL (ref 3.8–5.3)
SODIUM SERPL-SCNC: 139 MMOL/L (ref 136–145)
TROPONIN I SERPL HS-MCNC: <3 NG/L (ref ?–34)
WBC # BLD AUTO: 13.7 X10(3) UL (ref 4–11)

## 2025-04-22 PROCEDURE — 80048 BASIC METABOLIC PNL TOTAL CA: CPT | Performed by: EMERGENCY MEDICINE

## 2025-04-22 PROCEDURE — 36415 COLL VENOUS BLD VENIPUNCTURE: CPT

## 2025-04-22 PROCEDURE — 84484 ASSAY OF TROPONIN QUANT: CPT | Performed by: EMERGENCY MEDICINE

## 2025-04-22 PROCEDURE — 85025 COMPLETE CBC W/AUTO DIFF WBC: CPT | Performed by: EMERGENCY MEDICINE

## 2025-04-22 PROCEDURE — 99285 EMERGENCY DEPT VISIT HI MDM: CPT

## 2025-04-22 PROCEDURE — 71045 X-RAY EXAM CHEST 1 VIEW: CPT | Performed by: EMERGENCY MEDICINE

## 2025-04-22 PROCEDURE — 93005 ELECTROCARDIOGRAM TRACING: CPT

## 2025-04-22 PROCEDURE — 93010 ELECTROCARDIOGRAM REPORT: CPT

## 2025-04-22 PROCEDURE — 99284 EMERGENCY DEPT VISIT MOD MDM: CPT

## 2025-04-22 RX ORDER — DIPHENHYDRAMINE HCL 25 MG
50 CAPSULE ORAL ONCE
Status: COMPLETED | OUTPATIENT
Start: 2025-04-22 | End: 2025-04-22

## 2025-04-22 NOTE — ED INITIAL ASSESSMENT (HPI)
Pt came in for generalized chest pain radiating to the shoulder blades started yesterday.  Denies shortness of breath.  Pt is A/OX 4, breathing unlabored.  Took Tylenol yesterday.    March 25,2025 Removal of Parathyroid done at [place].

## 2025-04-22 NOTE — TELEPHONE ENCOUNTER
Action Requested: Summary for Provider     []  Critical Lab, Recommendations Needed  [] Need Additional Advice  []   FYI    []   Need Orders  [] Need Medications Sent to Pharmacy  []  Other     SUMMARY:sent to ER, pt stated last night  she had right back pain- shoulder blade area radiating to middle of chest, became dizzy with standing lasting 30 - 40 seconds , no SOB, itching all over, agreed to go to ER for a prompt eval     Reason for call: Shoulder Pain  Onset: yesterday                    Reason for Disposition   Child sounds very sick or weak to triager    Protocols used: Arm Pain-P-OH

## 2025-04-23 ENCOUNTER — OFFICE VISIT (OUTPATIENT)
Dept: FAMILY MEDICINE CLINIC | Facility: CLINIC | Age: 25
End: 2025-04-23

## 2025-04-23 ENCOUNTER — LAB ENCOUNTER (OUTPATIENT)
Dept: LAB | Age: 25
End: 2025-04-23
Attending: STUDENT IN AN ORGANIZED HEALTH CARE EDUCATION/TRAINING PROGRAM
Payer: MEDICAID

## 2025-04-23 VITALS
TEMPERATURE: 99 F | HEART RATE: 115 BPM | WEIGHT: 287 LBS | OXYGEN SATURATION: 98 % | BODY MASS INDEX: 43.5 KG/M2 | SYSTOLIC BLOOD PRESSURE: 124 MMHG | HEIGHT: 68 IN | DIASTOLIC BLOOD PRESSURE: 78 MMHG

## 2025-04-23 DIAGNOSIS — L30.9 DERMATITIS: ICD-10-CM

## 2025-04-23 DIAGNOSIS — E21.3 HYPERPARATHYROIDISM (HCC): ICD-10-CM

## 2025-04-23 DIAGNOSIS — R07.89 ATYPICAL CHEST PAIN: ICD-10-CM

## 2025-04-23 DIAGNOSIS — E11.65 TYPE 2 DIABETES MELLITUS WITH HYPERGLYCEMIA, WITHOUT LONG-TERM CURRENT USE OF INSULIN (HCC): ICD-10-CM

## 2025-04-23 DIAGNOSIS — E66.01 OBESITY, MORBID, BMI 50 OR HIGHER (HCC): ICD-10-CM

## 2025-04-23 DIAGNOSIS — F98.8 ATTENTION DEFICIT DISORDER, UNSPECIFIED TYPE: ICD-10-CM

## 2025-04-23 DIAGNOSIS — R23.8 SKIN IRRITATION: ICD-10-CM

## 2025-04-23 DIAGNOSIS — L30.9 DERMATITIS: Primary | ICD-10-CM

## 2025-04-23 PROCEDURE — 86003 ALLG SPEC IGE CRUDE XTRC EA: CPT | Performed by: STUDENT IN AN ORGANIZED HEALTH CARE EDUCATION/TRAINING PROGRAM

## 2025-04-23 PROCEDURE — 36415 COLL VENOUS BLD VENIPUNCTURE: CPT | Performed by: STUDENT IN AN ORGANIZED HEALTH CARE EDUCATION/TRAINING PROGRAM

## 2025-04-23 PROCEDURE — 99214 OFFICE O/P EST MOD 30 MIN: CPT | Performed by: STUDENT IN AN ORGANIZED HEALTH CARE EDUCATION/TRAINING PROGRAM

## 2025-04-23 PROCEDURE — 86003 ALLG SPEC IGE CRUDE XTRC EA: CPT

## 2025-04-23 PROCEDURE — 86225 DNA ANTIBODY NATIVE: CPT

## 2025-04-23 PROCEDURE — 82785 ASSAY OF IGE: CPT | Performed by: STUDENT IN AN ORGANIZED HEALTH CARE EDUCATION/TRAINING PROGRAM

## 2025-04-23 PROCEDURE — 86038 ANTINUCLEAR ANTIBODIES: CPT

## 2025-04-23 RX ORDER — LISDEXAMFETAMINE DIMESYLATE 60 MG/1
60 CAPSULE ORAL EVERY MORNING
Qty: 30 CAPSULE | Refills: 0 | Status: SHIPPED | OUTPATIENT
Start: 2025-04-23

## 2025-04-23 RX ORDER — METFORMIN HYDROCHLORIDE 500 MG/1
TABLET, EXTENDED RELEASE ORAL
COMMUNITY
Start: 2025-04-23

## 2025-04-23 NOTE — ED PROVIDER NOTES
Patient Seen in: [facility] Emergency Department    History     Chief Complaint   Patient presents with    Chest Pain    Back Pain       HPI    Patient presents to the ED complaining of generalized past chest pain starting yesterday.  She states pain is in the right chest and radiates to the sternum.  Also has pain in her right back.  Back and chest pain can be alleviated or worsened with certain movements or positions.  She denies pain with breathing.  Denies shortness of breath.  Also complains of rash and itching to her hands and feet starting this morning.  Denies any new foods or medications.    History reviewed. Past Medical History[1]    History reviewed. Past Surgical History[2]      Medications :  Prescriptions Prior to Admission[3]     Family History[4]    Smoking Status: Social Hx on file[5]    Constitutional and vital signs reviewed.      Social History and Family History elements reviewed from today, pertinent positives to the presenting problem noted.    Physical Exam     ED Triage Vitals [04/22/25 1617]   /81   Pulse 106   Resp 20   Temp 98.4 °F (36.9 °C)   Temp src Oral   SpO2 99 %   O2 Device None (Room air)       All measures to prevent infection transmission during my interaction with the patient were taken. Handwashing was performed prior to and after the exam.  Stethoscope and any equipment used during my examination was cleaned with super sani-cloth germicidal wipes following the exam.     Physical Exam  Vitals and nursing note reviewed.   Constitutional:       General: She is not in acute distress.     Appearance: She is obese. She is not ill-appearing or toxic-appearing.   HENT:      Head: Normocephalic and atraumatic.   Eyes:      General:         Right eye: No discharge.         Left eye: No discharge.      Conjunctiva/sclera: Conjunctivae normal.   Neck:      Trachea: No tracheal deviation.   Cardiovascular:      Rate and Rhythm: Normal rate and regular rhythm.      Heart  sounds: Normal heart sounds.   Pulmonary:      Effort: Pulmonary effort is normal. No respiratory distress.      Breath sounds: Normal breath sounds. No stridor.   Chest:      Chest wall: Tenderness present.      Comments: Tender to the right sternal border, this reproduces her pain symptoms.  Abdominal:      General: There is no distension.      Palpations: Abdomen is soft.   Musculoskeletal:         General: No deformity.      Comments: Tender to the right rhomboid muscles.  This reproduces her back pain symptoms.   Skin:     General: Skin is warm and dry.      Comments: Excoriated dorsal hands bilaterally.  Trace hives noted.   Neurological:      Mental Status: She is alert and oriented to person, place, and time.   Psychiatric:         Mood and Affect: Mood normal.         Behavior: Behavior normal.         ED Course        Labs Reviewed   CBC WITH DIFFERENTIAL WITH PLATELET - Abnormal; Notable for the following components:       Result Value    WBC 13.7 (*)     Neutrophil Absolute Prelim 8.98 (*)     Neutrophil Absolute 8.98 (*)     All other components within normal limits   BASIC METABOLIC PANEL (8) - Normal   TROPONIN I HIGH SENSITIVITY - Normal     EKG    Rate, intervals and axes as noted on EKG Report.  Rate: Normal, 93 bpm  Rhythm: Sinus Rhythm  Reading: Normal intervals, nonspecific T wave abnormality, abnormal EKG           As Interpreted by me    Imaging Results Available and Reviewed while in ED: XR CHEST AP PORTABLE  (CPT=71045)  Result Date: 4/22/2025  CONCLUSION: No acute cardiopulmonary disease.    Dictated by (CST): Ventura Goode MD on 4/22/2025 at 6:30 PM     Finalized by (CST): Ventura Goode MD on 4/22/2025 at 6:31 PM          ED Medications Administered:   Medications   diphenhydrAMINE (Benadryl) cap/tab 50 mg (50 mg Oral Given 4/22/25 1906)         MDM     Vitals:    04/22/25 1617 04/22/25 1938   BP: 136/81 141/85   Pulse: 106 96   Resp: 20 16   Temp: 98.4 °F (36.9 °C)    TempSrc:  Oral    SpO2: 99% 99%   Weight: 128.4 kg    Height: 172.7 cm (5' 8\")      *I personally reviewed and interpreted all ED vitals.    Pulse Ox: 99%, Room air, Normal     Differential Diagnosis/ Diagnostic Considerations: Musculoskeletal chest and back pain, ACS, pneumonia, pneumothorax, hives, allergic reaction, other    Complicating Factors: The patient already has does not have any pertinent problems on file. to contribute to the complexity of this ED evaluation.    Medical Decision Making  Patient presents to the ED with chest and back pain as well as mild allergic reaction symptoms.  Nondistressed on examination.  Cardiac workup without abnormality.  Chest x-ray without concerning findings.  Improved with Benadryl.  No concern for PE clinically.  Patient reassured and stable for discharge with outpatient follow-up.    Problems Addressed:  Allergic reaction, initial encounter: acute illness or injury  Chest pain, non-cardiac: acute illness or injury that poses a threat to life or bodily functions    Amount and/or Complexity of Data Reviewed  Labs: ordered. Decision-making details documented in ED Course.  Radiology: ordered and independent interpretation performed. Decision-making details documented in ED Course.     Details: I personally reviewed the patient's chest x-ray image and noted no pneumothorax or pneumonia  ECG/medicine tests: ordered and independent interpretation performed. Decision-making details documented in ED Course.        Condition upon leaving the department: Stable    Disposition and Plan     Clinical Impression:  1. Chest pain, non-cardiac    2. Allergic reaction, initial encounter        Disposition:  Discharge    Follow-up:  No follow-up provider specified.    Medications Prescribed:  Discharge Medication List as of 4/22/2025  8:21 PM                           [1]   Past Medical History:   ADD (attention deficit disorder)    8/13 AdderallXR 30  REFILL 3/23/12 - Adderall 20 mg XR  10/13/12 -  Adderal 20 XR and 10 mg short acting at lunch time    Anxiety    Anxiety state    Asthma    Pulmacort - albuterol prn    Attention deficit disorder    Attention deficit hyperactivity disorder (ADHD)    Binge eating disorder    Diabetes (HCC)    Fatty liver    Hypercholesteremia    Irritability and anger    Low vitamin D level    Migraines    Morbid obesity with BMI of 50.0-59.9, adult (HCC)    Multiple body piercings    Recent bereavement    Type 2 diabetes mellitus with hyperglycemia, without long-term current use of insulin (HCC)    Visual impairment    eyeglasses   [2]   Past Surgical History:  Procedure Laterality Date    Tonsillectomy  11/09/2018   [3] (Not in a hospital admission)  [4]   Family History  Problem Relation Age of Onset    Hypertension Father     Lipids Father     Cancer Mother         T-cell lymphoma    Asthma Mother     Diabetes Mother     Lipids Maternal Grandmother     Colon Cancer Maternal Grandfather     Diabetes Paternal Grandmother     Hypertension Paternal Grandmother     Cancer Paternal Grandmother         lung cancer    Chromosomal Disease Sister         down syndrome   [5]   Social History  Socioeconomic History    Marital status: Single   Tobacco Use    Smoking status: Never    Smokeless tobacco: Never   Substance and Sexual Activity    Alcohol use: Yes     Alcohol/week: 0.0 standard drinks of alcohol     Comment: OCC    Drug use: No    Sexual activity: Yes     Partners: Male     Birth control/protection: Condom   Other Topics Concern    Caffeine Concern Yes     Comment: soda

## 2025-04-23 NOTE — TELEPHONE ENCOUNTER
Patient notified, verbalized understanding.   Future Appointments   Date Time Provider Department Center   4/23/2025  3:45 PM Shahrzad Coffman MD [facility]   4/24/2025  9:45 AM Aric Brito PA-C [facility]

## 2025-04-23 NOTE — TELEPHONE ENCOUNTER
Patient was seen at [facility] emergency room yesterday for chest pain and an allergic reaction. Taking zyrtec as recommended. ER thought pain was muscular. Symptoms are still the same. Patient wanted to see Dr Coffman today but no appointments available till 4/28/2025. Appointment made for 4/28/2025 at 11:30am with Dr Coffman at [facility]. Also put patient on waiting list.

## 2025-04-23 NOTE — PROGRESS NOTES
HPI:    Patient ID: Joaquina Ferrari is a 25 year old female.    HPI  Pt presenting with rash.    Sun night onset of migraine 4/20  Stayed home Monday  Reports hand itching on 4/22  Spread to extremities, neck  Progressive since that time  Associated recurrent chest pain  Possible sensitivity to Cinnamon?  Zyrtec this morning with mild relief  No recent URI symptoms    Complains of recurrent pannus skin irritation due to weight loss  Doing well on Vyvanse dosing    Review of Systems   A comprehensive 10 point review of systems was completed.  Pertinent positives and negatives noted in the the HPI.     Current Medications[1]  Allergies:Allergies[2]   Vitals:    04/23/25 1604   BP: 124/78   Pulse: 115   Temp: 99 °F (37.2 °C)   SpO2: 98%   Weight: 287 lb (130.2 kg)   Height: 5' 8\" (1.727 m)       Body mass index is 43.64 kg/m².   PHYSICAL EXAM:   Physical Exam  Vitals reviewed.   Constitutional:       General: She is not in acute distress.  HENT:      Head: Normocephalic.   Eyes:      Conjunctiva/sclera: Conjunctivae normal.   Cardiovascular:      Rate and Rhythm: Normal rate and regular rhythm.      Pulses: Normal pulses.   Pulmonary:      Effort: Pulmonary effort is normal. No respiratory distress.   Musculoskeletal:      Cervical back: Normal range of motion.      Right lower leg: No edema.      Left lower leg: No edema.   Lymphadenopathy:      Cervical: No cervical adenopathy.   Skin:     Findings: Rash (erythematous macules on trunk, arms, neck) present.   Neurological:      General: No focal deficit present.      Mental Status: She is alert and oriented to person, place, and time. Mental status is at baseline.   Psychiatric:         Mood and Affect: Mood normal.         Behavior: Behavior normal.              ASSESSMENT/PLAN:   1. Dermatitis  Unclear etiology  Will check allergy testing  Discussed role of Allergy eval  Continue Zyrtec QID PRN  - discussed red flags for urgent reevaluation  - Allergen, Food,  Cinnamon [E]; Future  - Connective Tissue Disease (JEANNE) Screen [E]; Future  - Adult Food Allergy Prof [E]; Future  - ALLERGENS, ZONE 8 [71209] [Q]    2. Type 2 diabetes mellitus with hyperglycemia, without long-term current use of insulin ([facility])  Last A1c 7.0 Oct 2024  Continue Mounjaro, Metformin dosing  Anticipate Ophtho eval    3. Atypical chest pain  Recent labs, EKG reviewed  Will check TTE  - discussed red flags for urgent reevaluation  - CARD ECHO 2D DOPPLER (CPT=93306); Future    4. Hyperparathyroidism (HCC)  S/p parathyroidectomy  Followed by Steffanie, jani    5. Attention deficit disorder, unspecified type  I have reviewed the [place] Prescription Monitoring Program. This patient is appropriate for Vyvanse refill.  - Lisdexamfetamine Dimesylate 60 MG Oral Cap; Take 1 capsule (60 mg total) by mouth every morning.  Dispense: 30 capsule; Refill: 0    6. Skin irritation  Follow-up with Plastics  Continue gentle hygiene, consider Vaseline barrier application  - Plastic Surgery Referral - [place] ([place]ophia)    7. Obesity, morbid, BMI 50 or higher ([facility])  As above  - Plastic Surgery Referral - [place] (Sophia)    Pt verbalized understanding and agrees with plan.    Orders Placed This Encounter   Procedures    Allergen, Food, Cinnamon [E]    Connective Tissue Disease (JEANNE) Screen [E]    Adult Food Allergy Prof [E]    ALLERGENS, ZONE 8 [09898] [Q]       Meds This Visit:  Requested Prescriptions     Signed Prescriptions Disp Refills    Lisdexamfetamine Dimesylate 60 MG Oral Cap 30 capsule 0     Sig: Take 1 capsule (60 mg total) by mouth every morning.       Imaging & Referrals:  PLASTIC SURGERY - INTERNAL  CARD ECHO 2D DOPPLER (CPT=93306)         ID#[id number]       [1]   Current Outpatient Medications   Medication Sig Dispense Refill    Multiple Vitamin (MULTI-VITAMINS OR) Take 1 tablet by mouth daily as needed.      Calcium Carbonate Antacid 1000 MG Oral Chew Tab Chew 2 tablets by mouth 2 (two) times  daily.      metFORMIN  MG Oral Tablet 24 Hr       Lisdexamfetamine Dimesylate 60 MG Oral Cap Take 1 capsule (60 mg total) by mouth every morning. 30 capsule 0    atorvastatin 20 MG Oral Tab Take 1 tablet (20 mg total) by mouth nightly. 90 tablet 2    lisdexamfetamine 50 MG Oral Cap Take 1 capsule (50 mg total) by mouth every morning. (Patient not taking: Reported on 5/5/2025) 30 capsule 0    Tirzepatide (MOUNJARO) 15 MG/0.5ML Subcutaneous Solution Auto-injector Inject 15 mg into the skin once a week. 6 mL 2    ergocalciferol 1.25 MG (08963 UT) Oral Cap Take 1 capsule (50,000 Units total) by mouth once a week. 24 capsule 0    busPIRone 15 MG Oral Tab Take 1 tablet (15 mg total) by mouth every evening. 90 tablet 3    clonazePAM 0.5 MG Oral Tab Take 1 tablet (0.5 mg total) by mouth 3 (three) times daily as needed for Anxiety. 90 tablet 1    albuterol 108 (90 Base) MCG/ACT Inhalation Aero Soln Inhale 2 puffs into the lungs every 4 (four) hours as needed for Wheezing or Shortness of Breath (cough). 1 each 0    clonazePAM 0.25 MG Oral Tablet Dispersible Take 1 tablet (0.25 mg total) by mouth daily as needed. 30 tablet 1    levocetirizine 5 MG Oral Tab Take 1 tablet (5 mg total) by mouth every evening. 90 tablet 1    lisdexamfetamine (VYVANSE) 20 MG Oral Cap Take 1 capsule (20 mg total) by mouth every morning. (Patient not taking: Reported on 5/5/2025) 30 capsule 0    albuterol (2.5 MG/3ML) 0.083% Inhalation Nebu Soln Take 3 mL (2.5 mg total) by nebulization every 4 (four) hours as needed for Wheezing. 180 mL 1   [2] No Known Allergies

## 2025-04-24 ENCOUNTER — TELEPHONE (OUTPATIENT)
Dept: FAMILY MEDICINE CLINIC | Facility: CLINIC | Age: 25
End: 2025-04-24

## 2025-04-24 LAB
ATRIAL RATE: 93 BPM
P AXIS: 20 DEGREES
P-R INTERVAL: 152 MS
Q-T INTERVAL: 360 MS
QRS DURATION: 90 MS
QTC CALCULATION (BEZET): 447 MS
R AXIS: 56 DEGREES
T AXIS: 15 DEGREES
VENTRICULAR RATE: 93 BPM

## 2025-04-24 NOTE — TELEPHONE ENCOUNTER
Approved    Prior authorization approved  Payer: [employer] [facility] Case ID: [id number]    [phone]  Note from payer: The case has been Approved from  [id number]  Approval Details    Authorized from February 1, 2025 to April 24, 2026  Electronic appeal: Not supported  View History

## 2025-04-25 ENCOUNTER — PATIENT MESSAGE (OUTPATIENT)
Dept: FAMILY MEDICINE CLINIC | Facility: CLINIC | Age: 25
End: 2025-04-25

## 2025-04-25 LAB
DSDNA IGG SERPL IA-ACNC: 2.1 IU/ML (ref ?–10)
ENA AB SER QL IA: 0.2 UG/L (ref ?–0.7)
ENA AB SER QL IA: NEGATIVE
Lab: <0.1 KU/L
Lab: <0.1 KU/L

## 2025-04-26 LAB
ALLERGEN BRAZIL NUT: <0.1 KUA/L (ref ?–0.1)
ALMOND IGE QN: <0.1 KUA/L (ref ?–0.1)
CASHEW NUT IGE QN: <0.1 KUA/L (ref ?–0.1)
CLAM IGE QN: <0.1 KUA/L (ref ?–0.1)
CODFISH IGE QN: <0.1 KUA/L (ref ?–0.1)
CORN IGE QN: <0.1 KUA/L (ref ?–0.1)
COW MILK IGE QN: <0.1 KUA/L (ref ?–0.1)
EGG WHITE IGE QN: <0.1 KUA/L (ref ?–0.1)
GLUTEN IGE QN: 0.11 KUA/L (ref ?–0.1)
HAZELNUT IGE QN: <0.1 KUA/L (ref ?–0.1)
IGE SERPL-ACNC: 23.2 KU/L (ref 2–214)
PEANUT IGE QN: <0.1 KUA/L (ref ?–0.1)
SALMON IGE QN: <0.1 KUA/L (ref ?–0.1)
SCALLOP IGE QN: <0.1 KUA/L (ref ?–0.1)
SESAME SEED IGE QN: <0.1 KUA/L (ref ?–0.1)
SHRIMP IGE QN: <0.1 KUA/L (ref ?–0.1)
SOYBEAN IGE QN: <0.1 KUA/L (ref ?–0.1)
WALNUT IGE QN: <0.1 KUA/L (ref ?–0.1)
WHEAT IGE QN: <0.1 KUA/L (ref ?–0.1)

## 2025-04-27 ENCOUNTER — PATIENT MESSAGE (OUTPATIENT)
Dept: FAMILY MEDICINE CLINIC | Facility: CLINIC | Age: 25
End: 2025-04-27

## 2025-04-27 DIAGNOSIS — K90.0: Primary | ICD-10-CM

## 2025-04-28 NOTE — TELEPHONE ENCOUNTER
Patient read test results showing that she is allergic to gluten. She would like to know next steps to see if there is more testing to see what type of gluten she is allergic to.     Food allergy testing shows mild sensitivity to gluten. Please contact us with any questions.   Written by Shahrzad Coffman MD on 4/27/2025  5:04 PM CDT  Seen by patient Joaquina Douglasdaria on 4/27/2025  8:30 PM

## 2025-04-29 ENCOUNTER — TELEPHONE (OUTPATIENT)
Dept: ENDOCRINOLOGY CLINIC | Facility: CLINIC | Age: 25
End: 2025-04-29

## 2025-04-29 LAB

## 2025-04-29 NOTE — TELEPHONE ENCOUNTER
The patient called back asking again the details of her allergy, if it is possible to know. Please see her my chart images she attaches of her rashes. Thank you. Advised her to stay away from all gluten for now until we hear form the doctor and to continue with benadryl as needed and to monitor for any mouth or throat swelling.

## 2025-04-29 NOTE — TELEPHONE ENCOUNTER
Outside records showed   3/25/2025   Patho  Rt superior parathyroid   Left superior parathyroid

## 2025-05-02 ENCOUNTER — LAB ENCOUNTER (OUTPATIENT)
Dept: LAB | Age: 25
End: 2025-05-02
Attending: STUDENT IN AN ORGANIZED HEALTH CARE EDUCATION/TRAINING PROGRAM
Payer: MEDICAID

## 2025-05-02 DIAGNOSIS — K90.0: ICD-10-CM

## 2025-05-02 LAB — IGA SERPL-MCNC: 134 MG/DL (ref 70–312)

## 2025-05-02 PROCEDURE — 36415 COLL VENOUS BLD VENIPUNCTURE: CPT

## 2025-05-02 PROCEDURE — 86364 TISS TRNSGLTMNASE EA IG CLAS: CPT

## 2025-05-02 PROCEDURE — 82784 ASSAY IGA/IGD/IGG/IGM EACH: CPT

## 2025-05-05 ENCOUNTER — OFFICE VISIT (OUTPATIENT)
Dept: ALLERGY | Facility: CLINIC | Age: 25
End: 2025-05-05
Payer: MEDICAID

## 2025-05-05 DIAGNOSIS — Z23 NEED FOR COVID-19 VACCINE: ICD-10-CM

## 2025-05-05 DIAGNOSIS — Z91.018 FOOD ALLERGY: Primary | ICD-10-CM

## 2025-05-05 DIAGNOSIS — J45.30 MILD PERSISTENT EXTRINSIC ASTHMA WITHOUT COMPLICATION (HCC): ICD-10-CM

## 2025-05-05 DIAGNOSIS — Z23 FLU VACCINE NEED: ICD-10-CM

## 2025-05-05 RX ORDER — LEVOCETIRIZINE DIHYDROCHLORIDE 5 MG/1
5 TABLET, FILM COATED ORAL EVERY EVENING
Qty: 90 TABLET | Refills: 1 | Status: SHIPPED | OUTPATIENT
Start: 2025-05-05

## 2025-05-05 NOTE — PROGRESS NOTES
The following individual(s) verbally consented to be recorded using ambient AI listening technology and understand that they can each withdraw their consent to this listening technology at any point by asking the clinician to turn off or pause the recording:    Patient name: Joaquina Ferrari

## 2025-05-05 NOTE — PROGRESS NOTES
Joaquina Ferrari is a 25 year old female.    HPI:     Chief Complaint   Patient presents with    Allergies     Referred by PCP. Itchy bumps all over patient's hands and all the over body. Patient did have allergy testing completed from PCP. No antihistamines within the last 5 days.      Patient is a 25-year-old female who presents for allergy consultation upon referral of her PCP Dr. Coffman with a chief complaint of concern for allergies      Review of records show prior serum IgE profile to common food allergens in May 2, 2025 showing IgE production to gluten 0.11 and negative to the remaining foods.  Total IgE level 23.2  In addition celiac panel still pending.  Total IgA level was normal.  IC aero and cinnamon  negative     Immunizations reviewed  COVID-vaccine x 3 doses last in 2022  Last flu vaccine in October 2024  Prior pneumonia vaccine October 2022      Today patient reports      History of Present Illness  Joaquina Ferrari is a 25 year old female who presents with generalized itching and hives.    Approximately two to three weeks ago, she developed severe itching primarily on her hands, which then progressed to her ankles, back of her neck, scalp, and other areas of her body. The itching is accompanied by bumps similar to hives. The symptoms appeared after handling sourdough bread, which she makes at home.    She has been using Benadryl and Zyrtec to manage the symptoms, but had to stop these medications five days prior to the visit. The itching and hives have been persistent and very uncomfortable since they began.    She recently underwent allergy testing, which indicated a low level of gluten sensitivity. She has been avoiding gluten since April 25th, prior to her blood test on May 2nd. She is uncertain about the nature of her allergy, whether it is contact-based or ingestion-based, and how severe it might be.    She has a history of parathyroid surgery, having had two parathyroids removed at the end  of March. She takes several medications including atorvastatin, buspirone, calcium, clonazepam, metformin, and occasionally a multivitamin. She uses albuterol as needed, particularly when exercising.    In terms of social history, she vapes nicotine but does not consume alcohol or use drugs. She has a history of asthma, which was more severe in childhood, but has not had an asthma attack in approximately 15 years. She uses albuterol as needed, especially during exercise.    No fever, bites, stings, or infections preceding the onset of symptoms. She experienced a migraine on Easter Sunday, but no itching until the following Tuesday. She confirms the presence of a rash that resembles bug bites, which resolves within 24 hours.         HISTORY:  Past Medical History[1]   Past Surgical History[2]   Family History[3]   Social History: Short Social Hx on File[4]     Medications (Active prior to today's visit):  Current Medications[5]    Allergies:  Allergies[6]      ROS:     Allergic/Immuno:  See HPI  Cardiovascular:  Negative for irregular heartbeat/palpitations, chest pain, edema  Constitutional:  Negative night sweats,weight loss, irritability and lethargy  Endocrine:  Negative for cold intolerance, polydipsia and polyphagia  ENMT:  Negative for ear drainage, hearing loss and nasal drainage  Eyes:  Negative for eye discharge and vision loss  Gastrointestinal:  Negative for abdominal pain, diarrhea and vomiting  Genitourinary:  Negative for dysuria and hematuria  Hema/Lymph:  Negative for easy bleeding and easy bruising  Integumentary:  see hpi   Musculoskeletal:  Negative for joint symptoms  Neurological:  Negative for dizziness, seizures  Psychiatric:  Negative for inappropriate interaction and psychiatric symptoms  Respiratory:  Negative for cough, dyspnea and wheezing      PHYSICAL EXAM:   Constitutional: responsive, no acute distress noted  Head/Face: NC/Atraumatic  Eyes/Vision: conjunctiva and lids are normal  extraocular motion is intact   Ears/Audiometry: tympanic membranes are normal bilaterally hearing is grossly intact  Nose/Mouth/Throat: nose and throat are clear mucous membranes are moist   Neck/Thyroid: neck is supple without adenopathy  Lymphatic: no abnormal cervical, supraclavicular or axillary adenopathy is noted  Respiratory: normal to inspection lungs are clear to auscultation bilaterally normal respiratory effort   Cardiovascular: regular rate and rhythm no murmurs, gallups, or rubs  Abdomen: soft non-tender non-distended  Skin/Hair: Positive dermatographia screen with triple phase of Timothy  Extremities: no edema, cyanosis, or clubbing  Neurological:Oriented to time, place, person & situation.       ASSESSMENT/PLAN:   Assessment   Encounter Diagnoses   Name Primary?    Food allergy Yes    Flu vaccine need     Need for COVID-19 vaccine     Mild persistent extrinsic asthma without complication (HCC)        Assessment & Plan  Dermatographic urticaria  Characterized by hives following scratching, with symptoms present since Easter. Likely idiopathic and not related to food allergies, as gluten allergy tests showed very low levels. Currently acute, lasting less than six weeks. Symptomatic management is the primary approach.  - Use Zyrtec or Xyzal up to four times per day to control pruritus.  - Moisturize regularly to prevent xerosis and reduce pruritus.  - Consider Xolair if symptoms persist beyond six weeks and are not controlled with antihistamines.  - Provide a handout on hives and dermatographic urticaria.    Asthma, mild intermittent  Mild intermittent asthma with no recent exacerbations or emergency department visits. Symptoms are well-controlled without daily medication. Albuterol is used as needed, particularly before exercise.  - Continue albuterol as needed, especially before exercise.  - Monitor for any increase in symptoms that may require daily medication.     Flu vaccine in the fall  recommended\\    COVID-vaccine booster in the fall recommended    Pneumonia vaccine with Prevnar 20 recommended for history of asthma and over the age of 19.  Please check with Board of Health or local pharmacy.       Orders This Visit:  No orders of the defined types were placed in this encounter.      Meds This Visit:  Requested Prescriptions     Signed Prescriptions Disp Refills    levocetirizine 5 MG Oral Tab 90 tablet 1     Sig: Take 1 tablet (5 mg total) by mouth every evening.       Imaging & Referrals:  None     5/5/2025  Jourdan Dong MD      If medication samples were provided today, they were provided solely for patient education and training related to self administration of these medications.  Teaching, instruction and sample was provided to the patient by myself.  Teaching included  a review of potential adverse side effects as well as potential efficacy.  Patient's questions were answered in regards to medication administration and dosing and potential side effects. Teaching was provided via the teach back method         [1]   Past Medical History:   ADD (attention deficit disorder)    8/13 AdderallXR 30  REFILL 3/23/12 - Adderall 20 mg XR  10/13/12 - Adderal 20 XR and 10 mg short acting at lunch time    Anxiety    Anxiety state    Asthma    Pulmacort - albuterol prn    Attention deficit disorder    Attention deficit hyperactivity disorder (ADHD)    Binge eating disorder    Diabetes (HCC)    Fatty liver    Hypercholesteremia    Irritability and anger    Low vitamin D level    Migraines    Morbid obesity with BMI of 50.0-59.9, adult (HCC)    Multiple body piercings    Recent bereavement    Type 2 diabetes mellitus with hyperglycemia, without long-term current use of insulin (HCC)    Visual impairment    eyeglasses   [2]   Past Surgical History:  Procedure Laterality Date    Parathyroidectomy Bilateral 03/25/2025    Tonsillectomy  11/09/2018   [3]   Family History  Problem Relation Age of Onset     Hypertension Father     Lipids Father     Cancer Mother         T-cell lymphoma    Asthma Mother     Diabetes Mother     Lipids Maternal Grandmother     Colon Cancer Maternal Grandfather     Diabetes Paternal Grandmother     Hypertension Paternal Grandmother     Cancer Paternal Grandmother         lung cancer    Chromosomal Disease Sister         down syndrome   [4]   Social History  Socioeconomic History    Marital status: Single   Tobacco Use    Smoking status: Never    Smokeless tobacco: Current    Tobacco comments:     Patient vapes nicotine    Substance and Sexual Activity    Alcohol use: Not Currently     Comment: OCC    Drug use: No    Sexual activity: Yes     Partners: Male     Birth control/protection: Condom   Other Topics Concern    Caffeine Concern Yes     Comment: soda     Social Drivers of Health     Food Insecurity: No Food Insecurity (2/10/2025)    Received from [facility]    Hunger Vital Sign     Worried About Running Out of Food in the Last Year: Never true     Ran Out of Food in the Last Year: Never true   Transportation Needs: Unmet Transportation Needs (2/10/2025)    Received from [facility]    PRAPARE - Transportation     Lack of Transportation (Medical): Yes     Lack of Transportation (Non-Medical): Yes   Housing Stability: Unknown (2/10/2025)    Received from [facility]    Housing Stability Vital Sign     Unable to Pay for Housing in the Last Year: No   [5]   Current Outpatient Medications   Medication Sig Dispense Refill    levocetirizine 5 MG Oral Tab Take 1 tablet (5 mg total) by mouth every evening. 90 tablet 1    Multiple Vitamin (MULTI-VITAMINS OR) Take 1 tablet by mouth daily as needed.      Calcium Carbonate Antacid 1000 MG Oral Chew Tab Chew 2 tablets by mouth 2 (two) times daily.      metFORMIN  MG Oral Tablet 24 Hr       Lisdexamfetamine Dimesylate 60 MG Oral Cap Take 1 capsule (60 mg total) by mouth every  morning. 30 capsule 0    atorvastatin 20 MG Oral Tab Take 1 tablet (20 mg total) by mouth nightly. 90 tablet 2    Tirzepatide (MOUNJARO) 15 MG/0.5ML Subcutaneous Solution Auto-injector Inject 15 mg into the skin once a week. 6 mL 2    ergocalciferol 1.25 MG (44375 UT) Oral Cap Take 1 capsule (50,000 Units total) by mouth once a week. 24 capsule 0    busPIRone 15 MG Oral Tab Take 1 tablet (15 mg total) by mouth every evening. 90 tablet 3    clonazePAM 0.5 MG Oral Tab Take 1 tablet (0.5 mg total) by mouth 3 (three) times daily as needed for Anxiety. 90 tablet 1    albuterol 108 (90 Base) MCG/ACT Inhalation Aero Soln Inhale 2 puffs into the lungs every 4 (four) hours as needed for Wheezing or Shortness of Breath (cough). 1 each 0    clonazePAM 0.25 MG Oral Tablet Dispersible Take 1 tablet (0.25 mg total) by mouth daily as needed. 30 tablet 1    albuterol (2.5 MG/3ML) 0.083% Inhalation Nebu Soln Take 3 mL (2.5 mg total) by nebulization every 4 (four) hours as needed for Wheezing. 180 mL 1    lisdexamfetamine 50 MG Oral Cap Take 1 capsule (50 mg total) by mouth every morning. (Patient not taking: Reported on 5/5/2025) 30 capsule 0    lisdexamfetamine (VYVANSE) 20 MG Oral Cap Take 1 capsule (20 mg total) by mouth every morning. (Patient not taking: Reported on 5/5/2025) 30 capsule 0   [6] No Known Allergies

## 2025-05-05 NOTE — PATIENT INSTRUCTIONS
Dermatographic urticaria  Characterized by hives following scratching, with symptoms present since Easter. Likely idiopathic and not related to food allergies, as gluten allergy tests showed very low levels. Currently acute, lasting less than six weeks. Symptomatic management is the primary approach.  - Use Zyrtec or Xyzal up to four times per day to control pruritus.  - Moisturize regularly to prevent xerosis and reduce pruritus.  - Consider Xolair if symptoms persist beyond six weeks and are not controlled with antihistamines.  - Provide a handout on hives and dermatographic urticaria.    Asthma, mild intermittent  Mild intermittent asthma with no recent exacerbations or emergency department visits. Symptoms are well-controlled without daily medication. Albuterol is used as needed, particularly before exercise.  - Continue albuterol as needed, especially before exercise.  - Monitor for any increase in symptoms that may require daily medication.     Flu vaccine in the fall recommended\\    COVID-vaccine booster in the fall recommended    Pneumonia vaccine with Prevnar 20 recommended for history of asthma and over the age of 19.  Please check with Board of Health or local pharmacy.       Orders This Visit:  No orders of the defined types were placed in this encounter.      Meds This Visit:  Requested Prescriptions     Signed Prescriptions Disp Refills    levocetirizine 5 MG Oral Tab 90 tablet 1     Sig: Take 1 tablet (5 mg total) by mouth every evening.

## 2025-05-09 LAB — TTG IGA SER-ACNC: <0.2 U/ML (ref ?–7)

## 2025-05-14 ENCOUNTER — TELEPHONE (OUTPATIENT)
Dept: FAMILY MEDICINE CLINIC | Facility: CLINIC | Age: 25
End: 2025-05-14

## 2025-05-14 DIAGNOSIS — F98.8 ATTENTION DEFICIT DISORDER, UNSPECIFIED TYPE: ICD-10-CM

## 2025-05-15 NOTE — TELEPHONE ENCOUNTER
Patient has confirmed receipt of message via [employer].   No further action required at this time.

## 2025-05-15 NOTE — TELEPHONE ENCOUNTER
Mounjaro Denied    Denial reason: not preferred medication    Preferred drugs:   - liraglutide or Victoza  - Trulicity    How would you like to proceed?

## 2025-05-15 NOTE — TELEPHONE ENCOUNTER
Prior authorization for mounjaro was done through [employer]. It can take up to 14 business days for a decision to come back

## 2025-05-18 DIAGNOSIS — E55.9 VITAMIN D DEFICIENCY: ICD-10-CM

## 2025-05-18 DIAGNOSIS — F98.8 ATTENTION DEFICIT DISORDER, UNSPECIFIED TYPE: ICD-10-CM

## 2025-05-20 RX ORDER — LISDEXAMFETAMINE DIMESYLATE 60 MG/1
60 CAPSULE ORAL DAILY
Qty: 30 CAPSULE | Refills: 0 | Status: SHIPPED | OUTPATIENT
Start: 2025-06-20 | End: 2025-07-20

## 2025-05-20 RX ORDER — LISDEXAMFETAMINE DIMESYLATE 60 MG/1
60 CAPSULE ORAL DAILY
Qty: 30 CAPSULE | Refills: 0 | Status: SHIPPED | OUTPATIENT
Start: 2025-05-20 | End: 2025-06-19

## 2025-05-20 RX ORDER — ERGOCALCIFEROL 1.25 MG/1
50000 CAPSULE, LIQUID FILLED ORAL WEEKLY
Qty: 12 CAPSULE | Refills: 0 | Status: SHIPPED | OUTPATIENT
Start: 2025-05-20

## 2025-05-20 RX ORDER — LISDEXAMFETAMINE DIMESYLATE 60 MG/1
60 CAPSULE ORAL EVERY MORNING
Qty: 30 CAPSULE | Refills: 0 | OUTPATIENT
Start: 2025-05-20

## 2025-05-20 RX ORDER — LISDEXAMFETAMINE DIMESYLATE 60 MG/1
60 CAPSULE ORAL EVERY MORNING
Qty: 30 CAPSULE | Refills: 0 | Status: CANCELLED | OUTPATIENT
Start: 2025-05-20

## 2025-05-20 RX ORDER — LISDEXAMFETAMINE DIMESYLATE 60 MG/1
60 CAPSULE ORAL DAILY
Qty: 30 CAPSULE | Refills: 0 | Status: SHIPPED | OUTPATIENT
Start: 2025-07-21 | End: 2025-08-20

## 2025-05-20 NOTE — TELEPHONE ENCOUNTER
Patient Comment: Hi. I need refills for this sent over to [place] pharmacy, I only have 5 left. Are you able to send over a few refills so I dont have to reach out every month? Please and thank you! The 60MG is what has been working for me. Also are you able to put it under [employer] since my insurance approved it?     04/23/2025  LAST WRITTEN: 04/23/2025  Quantity: 30    Recent Visits  Date Type Provider Dept   04/23/25 Office Visit Shahrzad Coffman MD [facility]   10/24/24 Office Visit Shahrzad Coffman MD [facility]   02/24/24 Office Visit Shahrzad Coffman MD [facility]

## 2025-05-20 NOTE — TELEPHONE ENCOUNTER
Please Review. Protocol Failed; No Protocol     Requested Prescriptions   Pending Prescriptions Disp Refills    ERGOCALCIFEROL 1.25 MG (69292 UT) Oral Cap [Pharmacy Med Name: Vitamin D 50,000 Unit Cap Bion] 12 capsule 0     Sig: TAKE 1 CAPSULE BY MOUTH ONCE A WEEK       There is no refill protocol information for this order

## 2025-05-27 ENCOUNTER — TELEPHONE (OUTPATIENT)
Dept: FAMILY MEDICINE CLINIC | Facility: CLINIC | Age: 25
End: 2025-05-27

## 2025-06-02 ENCOUNTER — TELEPHONE (OUTPATIENT)
Dept: INTERNAL MEDICINE CLINIC | Facility: CLINIC | Age: 25
End: 2025-06-02

## 2025-06-02 NOTE — TELEPHONE ENCOUNTER
I received a call from Winstor from [employer] that he need the quantity for Mounjaro. He was inform of script below.    It was hard to understand this representative. He sounded like he had a sore throat.        Tirzepatide (MOUNJARO) 15 MG/0.5ML Subcutaneous Solution Auto-injector 6 mL 2 2/12/2025 --    Sig - Route: Inject 15 mg into the skin once a week. - Subcutaneous    Sent to pharmacy as: Mounjaro 15 MG/0.5ML Subcutaneous Solution Auto-injector (Tirzepatide)    E-Prescribing Status: Receipt confirmed by pharmacy (2/12/2025  4:58 PM CST)    Prior authorization: Waiting for Payer Response

## 2025-06-25 ENCOUNTER — TELEPHONE (OUTPATIENT)
Dept: FAMILY MEDICINE CLINIC | Facility: CLINIC | Age: 25
End: 2025-06-25

## 2025-06-25 NOTE — TELEPHONE ENCOUNTER
Medications - Current[1]    Rx: Lisdexamfetamine Dimesylate 60mg capsules    Key: [id number]       [1]   Current Outpatient Medications:     ergocalciferol 1.25 MG (73937 UT) Oral Cap, Take 1 capsule (50,000 Units total) by mouth once a week., Disp: 12 capsule, Rfl: 0    Lisdexamfetamine Dimesylate (VYVANSE) 60 MG Oral Cap, Take 1 capsule (60 mg total) by mouth daily., Disp: 30 capsule, Rfl: 0    [START ON 7/21/2025] Lisdexamfetamine Dimesylate (VYVANSE) 60 MG Oral Cap, Take 1 capsule (60 mg total) by mouth daily., Disp: 30 capsule, Rfl: 0    levocetirizine 5 MG Oral Tab, Take 1 tablet (5 mg total) by mouth every evening., Disp: 90 tablet, Rfl: 1    Multiple Vitamin (MULTI-VITAMINS OR), Take 1 tablet by mouth daily as needed., Disp: , Rfl:     Calcium Carbonate Antacid 1000 MG Oral Chew Tab, Chew 2 tablets by mouth 2 (two) times daily., Disp: , Rfl:     metFORMIN  MG Oral Tablet 24 Hr, , Disp: , Rfl:     Lisdexamfetamine Dimesylate 60 MG Oral Cap, Take 1 capsule (60 mg total) by mouth every morning., Disp: 30 capsule, Rfl: 0    atorvastatin 20 MG Oral Tab, Take 1 tablet (20 mg total) by mouth nightly., Disp: 90 tablet, Rfl: 2    lisdexamfetamine 50 MG Oral Cap, Take 1 capsule (50 mg total) by mouth every morning. (Patient not taking: Reported on 5/5/2025), Disp: 30 capsule, Rfl: 0    Tirzepatide (MOUNJARO) 15 MG/0.5ML Subcutaneous Solution Auto-injector, Inject 15 mg into the skin once a week., Disp: 6 mL, Rfl: 2    busPIRone 15 MG Oral Tab, Take 1 tablet (15 mg total) by mouth every evening., Disp: 90 tablet, Rfl: 3    clonazePAM 0.5 MG Oral Tab, Take 1 tablet (0.5 mg total) by mouth 3 (three) times daily as needed for Anxiety., Disp: 90 tablet, Rfl: 1    lisdexamfetamine (VYVANSE) 20 MG Oral Cap, Take 1 capsule (20 mg total) by mouth every morning. (Patient not taking: Reported on 5/5/2025), Disp: 30 capsule, Rfl: 0    clonazePAM 0.25 MG Oral Tablet Dispersible, Take 1 tablet (0.25 mg total) by mouth daily as  needed., Disp: 30 tablet, Rfl: 1    albuterol (2.5 MG/3ML) 0.083% Inhalation Nebu Soln, Take 3 mL (2.5 mg total) by nebulization every 4 (four) hours as needed for Wheezing., Disp: 180 mL, Rfl: 1

## 2025-07-18 DIAGNOSIS — F41.9 ANXIETY DISORDER, UNSPECIFIED: ICD-10-CM

## 2025-07-22 RX ORDER — CLONAZEPAM 0.5 MG/1
0.5 TABLET ORAL 3 TIMES DAILY PRN
Qty: 90 TABLET | Refills: 0 | OUTPATIENT
Start: 2025-07-22

## 2025-07-22 NOTE — TELEPHONE ENCOUNTER
Please review; protocol failed/No Protocol      Recent Fills: 02/20/2025 #42 for 14 day supply     Last Rx Written: 01/13/2025    Last Office Visit: 04/23/2025

## 2025-07-23 RX ORDER — CLONAZEPAM 0.5 MG/1
0.5 TABLET ORAL 3 TIMES DAILY PRN
Qty: 90 TABLET | Refills: 1 | Status: SHIPPED | OUTPATIENT
Start: 2025-07-23

## 2025-08-07 RX ORDER — METFORMIN HYDROCHLORIDE 500 MG/1
500 TABLET, EXTENDED RELEASE ORAL
Qty: 90 TABLET | Refills: 3 | Status: SHIPPED | OUTPATIENT
Start: 2025-08-07

## 2025-08-08 ENCOUNTER — TELEPHONE (OUTPATIENT)
Dept: FAMILY MEDICINE CLINIC | Facility: CLINIC | Age: 25
End: 2025-08-08

## 2025-08-09 ENCOUNTER — TELEPHONE (OUTPATIENT)
Dept: FAMILY MEDICINE CLINIC | Facility: CLINIC | Age: 25
End: 2025-08-09

## (undated) DIAGNOSIS — E11.65 TYPE 2 DIABETES MELLITUS WITH HYPERGLYCEMIA, WITHOUT LONG-TERM CURRENT USE OF INSULIN (HCC): ICD-10-CM

## (undated) DEVICE — STERILE LATEX POWDER-FREE SURGICAL GLOVES WITH HYDROGEL COATING, SMOOTH FINISH, STRAIGHT FINGER: Brand: PROTEXIS

## (undated) DEVICE — SUCTION CANISTER, 3000CC,SAFELINER: Brand: DEROYAL

## (undated) DEVICE — HOVERMATT 34IN SINGLE USE

## (undated) DEVICE — ELECTROSURGICAL SUCTION COAGULATOR, 10FR

## (undated) DEVICE — SOLUTION IRR BTL 250CC NACL

## (undated) DEVICE — T&A: Brand: MEDLINE INDUSTRIES, INC.

## (undated) DEVICE — CONMED ACCESSORY ELECTRODE, NEEDLE ELECTRODE

## (undated) NOTE — ED AVS SNAPSHOT
[facility] Emergency Department    [address]     [place] [address]    Phone:  [phone]    Fax:  Néstor Veronica   MRN: [id number]    Department:  [facility] Emergency Department   Date of Visit:  3/ - azithromycin 250 MG Tabs              Discharge Instructions       Push fluids  Tylenol and Ibuprofen for fever  Follow up with your doctor in 2 days    Discharge References/Attachments     CHEST AND LUNG PROBLEMS, DIAGNOSING: SURGICAL PROCEDURES (ENGLI and Class Registration line at [phone] or find a doctor online by visiting [email or web address].    IF THERE IS ANY CHANGE OR WORSENING OF YOUR CONDITION, CALL YOUR PRIMARY CARE PHYSICIAN AT ONCE OR RETURN IMMEDIATELY TO [address] [place] [address].     If you to explore options for quitting.     - If you have concerns related to behavioral health issues or thoughts of harming yourself, contact [facility] at [phone].     - If you don’t have insurance, Anabell Gutierrez

## (undated) NOTE — MR AVS SNAPSHOT
After Visit Summary   1/8/2020    Pedro Anton    MRN: [id number]           Visit Information     Date & Time  1/8/2020  9:45 AM Provider  Heidi Elias, [address] [facility], [address] [place] [address],[address], [facility]/[employer].  Phone  [phone] THINPREP PAP WITH HPV REFLEX REQUEST B [[id number] CUSTOM]  1/8/2020 1/8/2021      Future Appointments        Provider Department    2/17/2020 10:15 AM Suzie Jones [facility], [address] [facility],[address], [place]    4/9/2020 9:15 AM Aniket Chatterjee conditions such as allergies, colds, cough, fever, rash, sore throat, headache and pink eye. The cost for a Video Visit is currently $35.         If you receive a survey from [employer], please take a few minutes to complete it and provide feedback.  We s Emergency Medicine Providers  Conditions needing urgent attention, but are   non-life-threatening.     Also available by appointment     Average cost  $120*     EMERGENCY ROOM         Life-threatening emergencies needing immediate intervention at a hospital

## (undated) NOTE — LETTER
[address]  [facility]  Dept: [phone]  Dept Fax: [phone]: [phone]      March 7, 2017    Patient: Delonte White   Date of Visit: 3/7/2017       To Whom It May Concern:    Phil

## (undated) NOTE — ED AVS SNAPSHOT
Twyla Jorge   MRN: [id number]    Department:  [facility] Emergency Department   Date of Visit:  11/13/2018           Disclosure     Insurance plans vary and the physician(s) referred by the ER may not be covered by your plan.  Please conta CARE PHYSICIAN AT ONCE OR RETURN IMMEDIATELY TO THE EMERGENCY DEPARTMENT. If you have been prescribed any medication(s), please fill your prescription right away and begin taking the medication(s) as directed.   If you believe that any of the medications

## (undated) NOTE — LETTER
1/11/2020              Lisbeth Castel[address]         Dear Avani Park,    It was a pleasure to see you. Your PAP test was normal.  Current guidelines would recommend a repeat Pap smear in 3 years.   An annual wellness

## (undated) NOTE — MR AVS SNAPSHOT
[facility] [employer] [place] [address] [phone]               Thank you for choosing us for your health care visit with Ayo Vogt MD.  We are glad to serve you and happy to provide you with this summary of your v 135/79 mmHg (99 %, Z = 2.21 / 90 %, Z = 1.25*) 98    Height Weight    5' 7\" (1.702 m) (86 %*, Z = 1.07) 312 lb 3 oz (100 %*, Z = 2.87)    BMI Breastfeeding?     48.88 kg/m2 (99 %*, Z = 2.42) No    *Growth percentiles are based on CDC 2-20 Years data     B

## (undated) NOTE — MR AVS SNAPSHOT
[facility] [employer] [place] [address] [phone]               Thank you for choosing us for your health care visit with Hugo Mike MD.  We are glad to serve you and happy to provide you with this summary of your v What changed:    - medication strength  - how much to take   Commonly known as:  VYVANSE           Norgestim-Eth Estrad Triphasic 0.18/0.215/0.25 MG-25 MCG Tabs   Take 1 tablet by mouth daily. * Notice:   This list has 2 medication(s) that are the

## (undated) NOTE — MR AVS SNAPSHOT
[address] [facility]  [phone]               Thank you for choosing us for your health care visit with Joo Almonte CNM.   We are glad to serve you and happy to provide you with this sum * Albuterol Sulfate  (90 Base) MCG/ACT Aers   Inhale 2 puffs into the lungs every 4 (four) hours as needed for Wheezing.    Commonly known as:  PROAIR HFA           * albuterol sulfate (2.5 MG/3ML) 0.083% Nebu   Take 3 mL by nebulization every 4 (fo

## (undated) NOTE — MR AVS SNAPSHOT
[place]  [address]  [phone]               Thank you for choosing us for your health care visit with NANDA Molina.   We are glad to serve you and happy to provide you with this arauz Bariatric Referral [facility]    Complete by:  As directed    Assoc Dx:   Morbid obesity with BMI of 50.0-59.9, adult ([facility] [address] [E66.01, Z68.43]                 Referral Details     Referred By    Referred To    Dyana [employer] [facility] [address] [place], [place] [address] for making healthy food choices  -   Enjoy your food, but eat less. Fully enjoy your food when eating. Don’t eat while distracted and slow down. Avoid over sized portions. Don’t eat while when you’re bored.

## (undated) NOTE — MR AVS SNAPSHOT
[address] [facility]  [phone]               Thank you for choosing us for your health care visit with Loren Cano CNM.   We are glad to serve you and happy to provide you with this summary * Albuterol Sulfate  (90 Base) MCG/ACT Aers   Inhale 2 puffs into the lungs every 4 (four) hours as needed for Wheezing.    Commonly known as:  PROAIR HFA           * albuterol sulfate (2.5 MG/3ML) 0.083% Nebu   Take 3 mL by nebulization every 4 (fo

## (undated) NOTE — MR AVS SNAPSHOT
[place]  [address]  [phone]               Thank you for choosing us for your health care visit with NANDA Willoughby.   We are glad to serve you and happy to provide you with this arauz Today's Vital Signs     BP Pulse    122/80 mmHg (83 %, Z = 0.97 / 91 %, Z = 1.35*) 98    Height Weight    5' 7\" (1.702 m) (86 %*, Z = 1.07) 314 lb (142.429 kg) (100 %*, Z = 2.88)    BMI    49.17 kg/m2 (99 %*, Z = 2.42)    *Growth percentiles are based

## (undated) NOTE — ED AVS SNAPSHOT
Angeles Torres   MRN: [id number]    Department:  [facility] Emergency Department   Date of Visit:  11/17/2018           Disclosure     Insurance plans vary and the physician(s) referred by the ER may not be covered by your plan.  Please conta CARE PHYSICIAN AT ONCE OR RETURN IMMEDIATELY TO THE EMERGENCY DEPARTMENT. If you have been prescribed any medication(s), please fill your prescription right away and begin taking the medication(s) as directed.   If you believe that any of the medications

## (undated) NOTE — ED AVS SNAPSHOT
Skyla Angulo   MRN: [id number]    Department:  [facility] Emergency Department   Date of Visit:  9/13/2017           Disclosure     Insurance plans vary and the physician(s) referred by the ER may not be covered by your plan.  Please contac CARE PHYSICIAN AT ONCE OR RETURN IMMEDIATELY TO THE EMERGENCY DEPARTMENT. If you have been prescribed any medication(s), please fill your prescription right away and begin taking the medication(s) as directed.   If you believe that any of the medications

## (undated) NOTE — LETTER
Marily Burnett, [facility]  [address]  [place], [address]       10/29/18        Patient: Talia Lynch   YOB: 2000   Date of Visit: 10/29/2018       Dear  Dr. Hortencia Leo MD,      Thank you for refer

## (undated) NOTE — Clinical Note
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   Leny Lyons is in

## (undated) NOTE — ED AVS SNAPSHOT
[facility] Emergency Department    [place]ong [address]    Phone:  [phone]    Fax:  Néstor Veronica   MRN: [id number]    Department:  [facility] Emergency Department   Date of Visit:  3/ and Class Registration line at [phone] or find a doctor online by visiting [email or web address].    IF THERE IS ANY CHANGE OR WORSENING OF YOUR CONDITION, CALL YOUR PRIMARY CARE PHYSICIAN AT ONCE OR RETURN IMMEDIATELY TO [address] [place] [address].     If

## (undated) NOTE — ED AVS SNAPSHOT
[facility] AND [facility] in [facility].  [address] [facility]    Phone:  [phone]    Fax:  [address] [place] [address]   MRN: [id number]    Department:  [facility] AND [facility] in [address] [place] [address]   Date of Visit CHANGE how you take these medications     * Albuterol Sulfate  (90 Base) MCG/ACT Aers   Quantity:  1 Inhaler   Inhale 2 puffs into the lungs every 4 (four) hours as needed for Wheezing (Dispense with spacer). What changed:   This medication is a physician may seek payment directly from you for amounts other than your deductible, co-payment, or co-insurance and for other services not covered under your health insurance plan.   Please contact your insurance company and physician's office to determine different from what your Primary Care doctor has instructed you - please continue to take your medications as instructed by your Primary Care doctor until you can check with your doctor. Please bring the medication list to your next doctor's appointment. coverage. Patient [address] is a Federal Navigator program that can help with your Affordable Care Act coverage, as well as all types of Medicaid plans.   To get signed up and covered, please call [phone] and ask to get set up for an insuran

## (undated) NOTE — Clinical Note
Please update/check bariatric surgery and dietician benefits. Planning to attend seminar.      Thank you,  DANIEL Rees

## (undated) NOTE — MR AVS SNAPSHOT
[address] [facility]               Thank you for choosing us for your health care visit with Licha Neal MD.  We are glad to serve you and happy to provide you with this summary of your v Commonly known as:  VYVANSE           * Notice: This list has 2 medication(s) that are the same as other medications prescribed for you. Read the directions carefully, and ask your doctor or other care provider to review them with you.          Where to Malissa Schmidt

## (undated) NOTE — MR AVS SNAPSHOT
[address] [facility]  [phone]               Thank you for choosing us for your health care visit with Greg Mccann CNM.   We are glad to serve you and happy to provide you with this summary Take 3 mL by nebulization every 4 (four) hours as needed for Wheezing. Commonly known as:  VENTOLIN           MedroxyPROGESTERone Acetate 10 MG Tabs   Take 1 tablet (10 mg total) by mouth daily.    Commonly known as:  PROVERA           Terconazole 0.4 % C Diagnoses:   Morbid obesity, unspecified obesity type ([facility] [address]   PCOS (polycystic ovarian syndrome)   Irregular menstrual cycle   Order:  Bariatrics - Internal    Ger Mane MD   [address] [address]   Phone:  [phone] If you are confident that your benefit plan will not require a referral or authorization, such as [place] Medicaid, please feel free to schedule your appointment immediately.  However, if you are unsure about the requirements for authorization, please wait

## (undated) NOTE — LETTER
AUTHORIZATION FOR SURGICAL OPERATION OR OTHER PROCEDURE    1. I hereby authorizeCNM. Elaine Thapa, and [facility] staff assigned to my case to perform the following operation and/or procedure at the [facility]:    Nexplanon Removal and Nexplanon Insertation    2.  My physician has explained the nature and purpose of the operation or other procedure, possible alternative methods of treatment, the risks involved, and the possibility of complication to me.  I acknowledge that no guarantee has been made as to the result that may be obtained.  3.  I recognize that, during the course of this operation, or other procedure, unforseen conditions may necessitate additional or different procedure than those listed above.  I, therefore, further authorize and request that the above named physician, his/her physician assistants or designees perform such procedures as are, in his/her professional opinion, necessary and desirable.  4.  Any tissue or organs removed in the operation or other procedure may be disposed of by and at the discretion of the [facility] and [facility].  5.  I understand that in the event of a medical emergency, I will be transported by local paramedics to [facility] or other hospital emergency department.  6.  I certify that I have read and fully understand the above consent to operation and/or other procedure.    7.  I acknowledge that my physician has explained sedation/analgesia administration to me including the risks and benefits.  I consent to the administration of sedation/analgesia as may be necessary or desirable in the judgement of my physician.    Witness signature: ___________________________________________________ Date: 02/7/2024                    Time:  ________ A.M.  P.M.       Patient Name:  ______________________________________________________  (please print)      Patient signature:   ___________________________________________________             Relationship to Patient:           []  Parent    Responsible person                          []  Spouse  In case of minor or                    [] Other  _____________   Incompetent name:  __________________________________________________                               (please print)      _____________      Responsible person  In case of minor or  Incompetent signature:  _______________________________________________    Statement of Physician  My signature below affirms that prior to the time of the procedure, I have explained to the patient and/or his/her guardian, the risks and benefits involved in the proposed treatment and any reasonable alternative to the proposed treatment.  I have also explained the risks and benefits involved in the refusal of the proposed treatment and have answered the patient's questions.                        Date:  02/7/2024  Provider                      Signature:  __________________________________________________________       Time:  ___________ A.M    P.M.

## (undated) NOTE — MR AVS SNAPSHOT
[facility] [address] [place] [address] [phone]               Thank you for choosing us for your health care visit with Shalom Charles MD.  We are glad to serve you and happy to provide you with this summary of your v Lipid Panel    Complete by:   Mar 13, 2017 (Approximate)    Assoc Dx:  Binge eating disorder [F50.81], Low vitamin D level [E55.9], Snoring [R06.83], Chronic fatigue [R53.82], Morbid obesity with BMI of 45.0-49.9, adult ([facility] [E66.01, Z68.42] What changed:  Another medication with the same name was removed. Continue taking this medication, and follow the directions you see here.    Commonly known as:  VENTOLIN           azithromycin 250 MG Tabs   500 mg once followed by 250 mg daily x 4 days   C